# Patient Record
Sex: FEMALE | Employment: FULL TIME | URBAN - METROPOLITAN AREA
[De-identification: names, ages, dates, MRNs, and addresses within clinical notes are randomized per-mention and may not be internally consistent; named-entity substitution may affect disease eponyms.]

---

## 2020-09-16 ENCOUNTER — TRANSFERRED RECORDS (OUTPATIENT)
Dept: MULTI SPECIALTY CLINIC | Facility: CLINIC | Age: 55
End: 2020-09-16

## 2020-10-03 ENCOUNTER — TRANSFERRED RECORDS (OUTPATIENT)
Dept: MULTI SPECIALTY CLINIC | Facility: CLINIC | Age: 55
End: 2020-10-03

## 2020-10-03 LAB
HPV ABSTRACT: NORMAL
PAP-ABSTRACT: NORMAL

## 2021-08-03 ENCOUNTER — LAB REQUISITION (OUTPATIENT)
Dept: LAB | Facility: CLINIC | Age: 56
End: 2021-08-03

## 2021-08-03 PROCEDURE — 86481 TB AG RESPONSE T-CELL SUSP: CPT | Performed by: INTERNAL MEDICINE

## 2021-08-03 PROCEDURE — 87340 HEPATITIS B SURFACE AG IA: CPT | Performed by: INTERNAL MEDICINE

## 2021-08-03 PROCEDURE — 86706 HEP B SURFACE ANTIBODY: CPT | Performed by: INTERNAL MEDICINE

## 2021-08-04 LAB
HBV SURFACE AB SERPL IA-ACNC: 0.4 M[IU]/ML
HBV SURFACE AG SERPL QL IA: NONREACTIVE

## 2021-08-05 LAB
GAMMA INTERFERON BACKGROUND BLD IA-ACNC: 0.01 IU/ML
M TB IFN-G BLD-IMP: NEGATIVE
M TB IFN-G CD4+ BCKGRND COR BLD-ACNC: 8.24 IU/ML
MITOGEN IGNF BCKGRD COR BLD-ACNC: 0 IU/ML
MITOGEN IGNF BCKGRD COR BLD-ACNC: 0 IU/ML
QUANTIFERON MITOGEN: 8.25 IU/ML
QUANTIFERON NIL TUBE: 0.01 IU/ML
QUANTIFERON TB1 TUBE: 0.01 IU/ML
QUANTIFERON TB2 TUBE: 0.01

## 2021-09-08 ENCOUNTER — OFFICE VISIT (OUTPATIENT)
Dept: FAMILY MEDICINE | Facility: CLINIC | Age: 56
End: 2021-09-08
Payer: OTHER MISCELLANEOUS

## 2021-09-08 VITALS
HEART RATE: 78 BPM | DIASTOLIC BLOOD PRESSURE: 92 MMHG | SYSTOLIC BLOOD PRESSURE: 138 MMHG | OXYGEN SATURATION: 96 % | WEIGHT: 149.19 LBS

## 2021-09-08 DIAGNOSIS — R20.0 NUMBNESS AND TINGLING IN BOTH HANDS: Primary | ICD-10-CM

## 2021-09-08 DIAGNOSIS — R20.2 NUMBNESS AND TINGLING IN BOTH HANDS: Primary | ICD-10-CM

## 2021-09-08 PROCEDURE — 99203 OFFICE O/P NEW LOW 30 MIN: CPT | Performed by: NURSE PRACTITIONER

## 2021-09-08 RX ORDER — AMLODIPINE BESYLATE 10 MG/1
TABLET ORAL
COMMUNITY
Start: 2021-07-15 | End: 2023-10-18

## 2021-09-08 RX ORDER — POTASSIUM CHLORIDE 1500 MG/1
TABLET, EXTENDED RELEASE ORAL
COMMUNITY
Start: 2021-07-15 | End: 2021-11-26

## 2021-09-08 RX ORDER — LOSARTAN POTASSIUM 100 MG/1
TABLET ORAL
COMMUNITY
Start: 2021-07-15 | End: 2023-10-18

## 2021-09-08 NOTE — LETTER
September 8, 2021      Geovanna SUAZO Coco  [address] [place] [address]        To Whom It May Concern:    Geovanna was evaluated at my clinic today, on 9/8/2021.  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          Sincerely,        Carmelo D eLos Santos, CNP

## 2021-09-08 NOTE — PATIENT INSTRUCTIONS
We need to check an EMG of your bilateral arms to evaluate for carpal tunnel.    I need you to call and schedule appointment with the spine clinic at the [facility] to get the EMG completed.  Please call [phone] to set that up.    Ibuprofen 400 mg 3 times daily with food.    Continue with your wrist splints.    Note for work provided today.

## 2021-09-08 NOTE — LETTER
September 8, 2021      Geovanna SUAZO Coco  [address] [place] [address]        To Whom It May Concern:    Geovanna was evaluated at my clinic today, on 9/8/2021.  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        Sincerely,        Carmelo De Los Santos, CNP

## 2021-09-08 NOTE — PROGRESS NOTES
Assessment & Plan     Numbness and tingling in both hands  - EMG; Future    Patient Instructions   We need to check an EMG of your bilateral arms to evaluate for carpal tunnel.    I need you to call and schedule appointment with the spine clinic at the [facility] to get the EMG completed.  Please call [phone] to set that up.    Ibuprofen 400 mg 3 times daily with food.    Continue with your wrist splints.    Note for work provided today.      Ordering of each unique test  18 minutes spent on the date of the encounter doing chart review, history and exam, documentation and further activities per the note     See Patient Instructions    No follow-ups on file.    Carmelo De Los Santos, [facility]    Didi Austin is a 56 year old who presents for the following health issues  HPI     Patient comes the clinic today with numbness and tingling in her bilateral hands.  Present for the last few months but seems to be getting worse.  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  She has been wearing some braces during the day and at night.  Has not tried NSAIDs or ice.    Review of Systems   Constitutional, HEENT, cardiovascular, pulmonary, gi and gu systems are negative, except as otherwise noted.      Objective    BP (!) 138/92 (BP Location: Right arm, Patient Position: Sitting, Cuff Size: Adult Regular)   Pulse 78   Wt 67.7 kg (149 lb 3 oz)   SpO2 96%   There is no height or weight on file to calculate BMI.  Physical Exam     Phalen sign positive bilaterally

## 2021-09-27 ENCOUNTER — TELEPHONE (OUTPATIENT)
Dept: FAMILY MEDICINE | Facility: CLINIC | Age: 56
End: 2021-09-27

## 2021-09-27 DIAGNOSIS — F41.9 ANXIETY: Primary | ICD-10-CM

## 2021-09-27 NOTE — TELEPHONE ENCOUNTER
Reason for Call:  Other     Detailed comments: Patient requesting extension of work excuse for employer.  She is schedule for EMG on 10/6 and feel that she has not been diagnosed yet and would like to wait until diagnosis has been completed.    Patient will pick will up at clinic.  Please call when available.      Phone Number Patient can be reached at: Home number on file [phone] (home)    Best Time: any    Can we leave a detailed message on this number? YES    Call taken on 9/27/2021 at 3:53 PM by Laura L Goldberg, ARRT

## 2021-09-27 NOTE — LETTER
September 28, 2021      Geovanna Sepulveda  [address] [place] [address]        To Whom It May Concern:    Geovanna continues to suffer significant pain.  We are working towards a clear diagnosis at this time.     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           Sincerely,           Carmelo De Los Santos, CNP

## 2021-09-28 RX ORDER — HYDROXYZINE PAMOATE 25 MG/1
25 CAPSULE ORAL AT BEDTIME
Qty: 30 CAPSULE | Refills: 0 | Status: SHIPPED | OUTPATIENT
Start: 2021-09-28 | End: 2021-10-18

## 2021-09-28 NOTE — TELEPHONE ENCOUNTER
Patient's , Gigi Sepulveda will come to the clinic and  this letter. Patient has given a verbal authorization for him to  the letter.    Blanche Nowak

## 2021-10-07 ENCOUNTER — OFFICE VISIT (OUTPATIENT)
Dept: PHYSICAL MEDICINE AND REHAB | Facility: CLINIC | Age: 56
End: 2021-10-07
Attending: NURSE PRACTITIONER
Payer: OTHER MISCELLANEOUS

## 2021-10-07 DIAGNOSIS — R20.0 NUMBNESS AND TINGLING IN BOTH HANDS: ICD-10-CM

## 2021-10-07 DIAGNOSIS — R20.2 NUMBNESS AND TINGLING IN BOTH HANDS: ICD-10-CM

## 2021-10-07 PROCEDURE — 95911 NRV CNDJ TEST 9-10 STUDIES: CPT | Performed by: PHYSICAL MEDICINE & REHABILITATION

## 2021-10-07 PROCEDURE — 95886 MUSC TEST DONE W/N TEST COMP: CPT | Mod: RT | Performed by: PHYSICAL MEDICINE & REHABILITATION

## 2021-10-07 NOTE — PATIENT INSTRUCTIONS
Thank you for choosing the [facility] for your EMG testing.    The ordering provider will receive your final EMG results within the next few days.  Please follow up with your provider for the results and further treatment recommendations.

## 2021-10-07 NOTE — PROGRESS NOTES
Please call the patient and let her know that I reviewed the EMG study from Dr. Soler's.  There is some evidence of carpal tunnel syndrome based on that study.  If she would like to have a consultation with neurosurgery, I can place a referral for her.

## 2021-10-07 NOTE — LETTER
10/7/2021         RE: Geovanna Sepulveda  [address]  [place] [address]        Dear Colleague,    Thank you for referring your patient, Geovanna Sepulveda, to the [facility]. Please see a copy of my visit note below.    Patient presents at the request of Carmelo De Los Santos CNP for bilateral upper extremity EMG.  She has over 1 month history of bilateral hand numbness tingling and pain all fingers involved up to the elbows.  Right hand is worse than left.  She is right-handed.  On exam she has normal sensation to light touch of the upper extremities, normal reflexes to the upper extremities and normal strength throughout the major muscle groups of the upper extremities.    EMG/NCS  results: Please see scanned full report.    Comment NCS: Abnormal study:    1.  Mildly prolonged right median SNAP latency  2.  Mildly prolonged bilateral median transcarpal latencies.   3.  Prolonged bilateral median versus ulnar transcarpal latency comparison.  4.  Normal bilateral ulnar studies.  5.  Left ulnar motor inching across the elbow recording at ADM: No focal slowing or amplitude drop across the medial epicondyle.    Comment EMG: Normal study.  1.  Normal needle EMG bilateral upper extremities.    Interpretation: Abnormal study: There is electrodiagnostic evidence of:    1.  Bilateral median neuropathy at the wrist consistent with entrapment in the carpal tunnel, mild in severity.  Right equal to left in severity    2. There is no electrodiagnostic evidence of cervical radiculopathy, brachial plexopathy, or ulnar neuropathy in the bilateral upper extremities.    The testing was completed in its entirety by the physician.      It was our pleasure caring for your patient today, if there any questions or concerns please do not hesitate to contact us.        Again, thank you for allowing me to participate in the care of your patient.        Sincerely,        Temo Magdaleno, DO    
0

## 2021-10-07 NOTE — PROGRESS NOTES
Patient presents at the request of Carmelo De Los Santos CNP for bilateral upper extremity EMG.  She has over 1 month history of bilateral hand numbness tingling and pain all fingers involved up to the elbows.  Right hand is worse than left.  She is right-handed.  On exam she has normal sensation to light touch of the upper extremities, normal reflexes to the upper extremities and normal strength throughout the major muscle groups of the upper extremities.    EMG/NCS  results: Please see scanned full report.    Comment NCS: Abnormal study:    1.  Mildly prolonged right median SNAP latency  2.  Mildly prolonged bilateral median transcarpal latencies.   3.  Prolonged bilateral median versus ulnar transcarpal latency comparison.  4.  Normal bilateral ulnar studies.  5.  Left ulnar motor inching across the elbow recording at ADM: No focal slowing or amplitude drop across the medial epicondyle.    Comment EMG: Normal study.  1.  Normal needle EMG bilateral upper extremities.    Interpretation: Abnormal study: There is electrodiagnostic evidence of:    1.  Bilateral median neuropathy at the wrist consistent with entrapment in the carpal tunnel, mild in severity.  Right equal to left in severity    2. There is no electrodiagnostic evidence of cervical radiculopathy, brachial plexopathy, or ulnar neuropathy in the bilateral upper extremities.    The testing was completed in its entirety by the physician.      It was our pleasure caring for your patient today, if there any questions or concerns please do not hesitate to contact us.

## 2021-10-08 ENCOUNTER — TELEPHONE (OUTPATIENT)
Dept: FAMILY MEDICINE | Facility: CLINIC | Age: 56
End: 2021-10-08

## 2021-10-08 NOTE — TELEPHONE ENCOUNTER
Message below relayed to patient. She would like you to place the referral and requests a call back with the scheduling information     DIONTE Peres, Carmelo Renny, CNP at 10/7/2021  3:00 PM    Status: Signed      Please call the patient and let her know that I reviewed the EMG study from Dr. Soler's.  There is some evidence of carpal tunnel syndrome based on that study.  If she would like to have a consultation with neurosurgery, I can place a referral for her.

## 2021-10-12 NOTE — TELEPHONE ENCOUNTER
[facility]   [facility] [facility] [address]   Phone: [phone]   Fax: [phone]     LM for patient with the above referral info

## 2021-10-15 DIAGNOSIS — F41.9 ANXIETY: ICD-10-CM

## 2021-10-15 RX ORDER — HYDROXYZINE PAMOATE 25 MG/1
25 CAPSULE ORAL AT BEDTIME
Qty: 30 CAPSULE | Refills: 0 | Status: CANCELLED | OUTPATIENT
Start: 2021-10-15

## 2021-10-15 NOTE — TELEPHONE ENCOUNTER
No PCP    Refill Request  Medication name: Pending Prescriptions:                       Disp   Refills    hydrOXYzine (VISTARIL) 25 MG capsule      30 cap*0            Sig: Take 1 capsule (25 mg) by mouth At Bedtime    Who prescribed the medication: Filiberto  Last refill on medication: unknown  Requested Pharmacy: [facility]  Last appointment with PCP: 09/08/21  Next appointment: Not due     Request for 90 day supply

## 2021-10-15 NOTE — TELEPHONE ENCOUNTER
Requesting 90 day supply    Refill Request  Medication name: Pending Prescriptions:                       Disp   Refills    hydrOXYzine (VISTARIL) 25 MG capsule      30 cap*0            Sig: Take 1 capsule (25 mg) by mouth At Bedtime    Who prescribed the medication: Filiberto  Last refill on medication: 09/28/21  Requested Pharmacy: [facility]  Last appointment with PCP: 09/08/21  Next appointment: Not due    NO PCP

## 2021-10-18 RX ORDER — HYDROXYZINE PAMOATE 25 MG/1
25 CAPSULE ORAL AT BEDTIME
Qty: 30 CAPSULE | Refills: 0 | Status: SHIPPED | OUTPATIENT
Start: 2021-10-18 | End: 2023-10-18

## 2021-10-28 ENCOUNTER — OFFICE VISIT (OUTPATIENT)
Dept: FAMILY MEDICINE | Facility: CLINIC | Age: 56
End: 2021-10-28
Payer: OTHER MISCELLANEOUS

## 2021-10-28 VITALS
SYSTOLIC BLOOD PRESSURE: 132 MMHG | HEART RATE: 75 BPM | OXYGEN SATURATION: 98 % | WEIGHT: 150.5 LBS | DIASTOLIC BLOOD PRESSURE: 84 MMHG

## 2021-10-28 DIAGNOSIS — G56.03 BILATERAL CARPAL TUNNEL SYNDROME: Primary | ICD-10-CM

## 2021-10-28 PROCEDURE — 99213 OFFICE O/P EST LOW 20 MIN: CPT | Performed by: NURSE PRACTITIONER

## 2021-10-28 NOTE — PATIENT INSTRUCTIONS
"Please call [facility] and set up an appointment with Dr. Brice to discuss your carpal tunnel syndrome.  [phone].    I will give you a copy of the EMG results from today.  Take these with you to your appointment with Dr. 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      "

## 2021-10-28 NOTE — LETTER
October 28, 2021      Geovanna Sepulveda  [address] [place] [address]        To Whom It May Concern:    Geovanna Sepulveda was seen in our clinic.     She has had an EMG study completed which has revealed bilateral carpal tunnel syndrome.  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    She is currently in the process of working with a specialist on how to treat her carpal tunnel syndrome.  The extent of these restrictions will be determined after her next appointment with her specialist      Sincerely,        Carmelo De Los Santos, CNP

## 2021-10-29 NOTE — PROGRESS NOTES
"  Assessment & Plan     Bilateral carpal tunnel syndrome  Confirmed on EMG, although EMG testing suggests that mild severity. She would like to consult with a specialist. She has been having a hard time scheduling with neurosurgery so I recommended that she see Dr. Brice and Carmen.    - Orthopedic  Referral; Future    Patient Instructions   Please call [facility] and set up an appointment with Dr. Brice to discuss your carpal tunnel syndrome.  [phone].    I will give you a copy of the EMG results from today.  Take these with you to your appointment with Dr. 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       See Patient Instructions    Return in about 6 months (around 4/28/2022) for Follow up.    Carmelo De Los Santos, [facility]    Didi Austin is a 56 year old who presents for the following health issues     HPI     Has been continue to struggle with numbness and tingling/pain in her bilateral forearms. Had EMG which confirmed CTS. Tried to schedule with [facility] but could not get through on telephone line.     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       Review of Systems   Constitutional, HEENT, cardiovascular, pulmonary, gi and gu systems are negative, except as otherwise noted.      Objective    /84 (BP Location: Right arm, Patient Position: Sitting, Cuff Size: Adult Regular)   Pulse 75   Wt 68.3 kg (150 lb 8 oz)   SpO2 98%   There is no height or weight on file to calculate BMI.  Physical Exam   Healthy appearing female in no acute distress, accompanied by .         " RA (rheumatoid arthritis)

## 2021-11-26 ENCOUNTER — OFFICE VISIT (OUTPATIENT)
Dept: INTERNAL MEDICINE | Facility: CLINIC | Age: 56
End: 2021-11-26
Payer: OTHER MISCELLANEOUS

## 2021-11-26 VITALS
WEIGHT: 155 LBS | HEART RATE: 78 BPM | OXYGEN SATURATION: 98 % | DIASTOLIC BLOOD PRESSURE: 82 MMHG | HEIGHT: 61 IN | SYSTOLIC BLOOD PRESSURE: 132 MMHG | BODY MASS INDEX: 29.27 KG/M2

## 2021-11-26 DIAGNOSIS — Z01.818 PREOPERATIVE EXAMINATION: ICD-10-CM

## 2021-11-26 DIAGNOSIS — R73.03 PREDIABETES: ICD-10-CM

## 2021-11-26 DIAGNOSIS — I10 BENIGN ESSENTIAL HYPERTENSION: ICD-10-CM

## 2021-11-26 DIAGNOSIS — E66.3 OVERWEIGHT (BMI 25.0-29.9): ICD-10-CM

## 2021-11-26 DIAGNOSIS — G56.03 BILATERAL CARPAL TUNNEL SYNDROME: ICD-10-CM

## 2021-11-26 DIAGNOSIS — F41.9 ANXIETY: ICD-10-CM

## 2021-11-26 LAB
ALBUMIN SERPL-MCNC: 4.1 G/DL (ref 3.5–5)
ALP SERPL-CCNC: 102 U/L (ref 45–120)
ALT SERPL W P-5'-P-CCNC: 18 U/L (ref 0–45)
ANION GAP SERPL CALCULATED.3IONS-SCNC: 13 MMOL/L (ref 5–18)
AST SERPL W P-5'-P-CCNC: 18 U/L (ref 0–40)
BASOPHILS # BLD AUTO: 0.1 10E3/UL (ref 0–0.2)
BASOPHILS NFR BLD AUTO: 1 %
BILIRUB SERPL-MCNC: 0.2 MG/DL (ref 0–1)
BUN SERPL-MCNC: 27 MG/DL (ref 8–22)
CALCIUM SERPL-MCNC: 9.9 MG/DL (ref 8.5–10.5)
CHLORIDE BLD-SCNC: 104 MMOL/L (ref 98–107)
CO2 SERPL-SCNC: 24 MMOL/L (ref 22–31)
CREAT SERPL-MCNC: 0.83 MG/DL (ref 0.6–1.1)
EOSINOPHIL # BLD AUTO: 0.7 10E3/UL (ref 0–0.7)
EOSINOPHIL NFR BLD AUTO: 6 %
ERYTHROCYTE [DISTWIDTH] IN BLOOD BY AUTOMATED COUNT: 13 % (ref 10–15)
GFR SERPL CREATININE-BSD FRML MDRD: 80 ML/MIN/1.73M2
GLUCOSE BLD-MCNC: 89 MG/DL (ref 70–125)
HBA1C MFR BLD: 5.8 % (ref 0–5.6)
HCT VFR BLD AUTO: 41.4 % (ref 35–47)
HGB BLD-MCNC: 13.7 G/DL (ref 11.7–15.7)
IMM GRANULOCYTES # BLD: 0 10E3/UL
IMM GRANULOCYTES NFR BLD: 0 %
LYMPHOCYTES # BLD AUTO: 3.2 10E3/UL (ref 0.8–5.3)
LYMPHOCYTES NFR BLD AUTO: 24 %
MCH RBC QN AUTO: 28.4 PG (ref 26.5–33)
MCHC RBC AUTO-ENTMCNC: 33.1 G/DL (ref 31.5–36.5)
MCV RBC AUTO: 86 FL (ref 78–100)
MONOCYTES # BLD AUTO: 0.7 10E3/UL (ref 0–1.3)
MONOCYTES NFR BLD AUTO: 6 %
NEUTROPHILS # BLD AUTO: 8.4 10E3/UL (ref 1.6–8.3)
NEUTROPHILS NFR BLD AUTO: 64 %
PLATELET # BLD AUTO: 405 10E3/UL (ref 150–450)
POTASSIUM BLD-SCNC: 3.5 MMOL/L (ref 3.5–5)
PROT SERPL-MCNC: 8.1 G/DL (ref 6–8)
RBC # BLD AUTO: 4.83 10E6/UL (ref 3.8–5.2)
SODIUM SERPL-SCNC: 141 MMOL/L (ref 136–145)
WBC # BLD AUTO: 13.1 10E3/UL (ref 4–11)

## 2021-11-26 PROCEDURE — 80053 COMPREHEN METABOLIC PANEL: CPT | Performed by: NURSE PRACTITIONER

## 2021-11-26 PROCEDURE — 83036 HEMOGLOBIN GLYCOSYLATED A1C: CPT | Performed by: NURSE PRACTITIONER

## 2021-11-26 PROCEDURE — 99214 OFFICE O/P EST MOD 30 MIN: CPT | Performed by: NURSE PRACTITIONER

## 2021-11-26 PROCEDURE — 36415 COLL VENOUS BLD VENIPUNCTURE: CPT | Performed by: NURSE PRACTITIONER

## 2021-11-26 PROCEDURE — 85025 COMPLETE CBC W/AUTO DIFF WBC: CPT | Performed by: NURSE PRACTITIONER

## 2021-11-26 RX ORDER — POTASSIUM CHLORIDE 1500 MG/1
TABLET, EXTENDED RELEASE ORAL
COMMUNITY
Start: 2021-10-23 | End: 2023-10-18

## 2021-11-26 ASSESSMENT — MIFFLIN-ST. JEOR: SCORE: 1235.46

## 2021-11-26 NOTE — PATIENT INSTRUCTIONS
Continue your current medications. No changes prior to surgery.    No Aleve, Advil, or Ibuprofen 7 days prior to surgery.    COVID testing has been ordered, scheduling will call you to set this.    Follow up prior to surgery if having new symptoms.  Patient Education

## 2021-11-26 NOTE — PROGRESS NOTES
[facility]  [address] [place] [address]  Phone: [phone]  Fax: [phone]  Primary Provider: No Ref-Primary, Physician  Pre-op Performing Provider: SCOTT BARBA    PREOPERATIVE EVALUATION:  Today's date: 11/26/2021    Geovanna Sepulveda is a 56 year old female who presents for a preoperative evaluation.  Pre-op 12/2/21, Carpel Tunnel Surgery, [facility]  Surgical Information:  Surgery/Procedure: Carpal tunnel surgery  Surgery Location: [facility]  Surgeon:   Surgery Date: 12/02/2021  Time of Surgery:   Where patient plans to recover: At home with family  Fax number for surgical facility:     Type of Anesthesia Anticipated: Local with MAC    Assessment & Plan     The proposed surgical procedure is considered INTERMEDIATE risk.    Preoperative examination: COVID testing ordered three days prior to surgery. No NSAIDS 7 days prior to surgery. Follow up prior to surgery if having new symptoms.   - CBC with platelets and differential  - Asymptomatic COVID-19 Virus (Coronavirus) by PCR  - CBC with platelets and differential    Bilateral carpal tunnel syndrome: To have a bilateral carpal tunnel release.     Benign essential hypertension: Blood pressure today in office was 132/82. She continues on Norvasc and Losartan. Stable.     Prediabetes: A1C today was 5.8%. Diet and exercise controlled. Stable.   - Comprehensive metabolic panel (BMP + Alb, Alk Phos, ALT, AST, Total. Bili, TP)  - Hemoglobin A1c    Overweight (BMI 25.0-29.9): She continues to work on diet and exercise.     Anxiety: hx of this. Uses hydroxyzine PRN. Stable.     Risks and Recommendations:  The patient has the following additional risks and recommendations for perioperative complications:   - No identified additional risk factors other than previously addressed    Medication Instructions:  Continue all medications, no changes    RECOMMENDATION:  APPROVAL GIVEN to proceed with  proposed procedure, without further diagnostic evaluation.    Subjective     HPI related to upcoming procedure: The patient presents today for a preoperative examination.    She will be having bilateral carpal tunnel surgery done on 12/02. She reports that she works for [employer], and has been wiping down carts, and cashiering for months, and her wrists started to hurt her. She had EMG's done which showed the carpal tunnel.    She continues to wear her wrist braces daily.    She reports that she has a history of high blood pressure, but no other cardiac issues.    She has never had a heart attack, stroke, or a history of obstructive sleep apnea.    She would like to be a full code.    She denies other concerns today.     Preop Questions 11/26/2021   1. Have you ever had a heart attack or stroke? No   2. Have you ever had surgery on your heart or blood vessels, such as a stent placement, a coronary artery bypass, or surgery on an artery in your head, neck, heart, or legs? No   3. Do you have chest pain with activity? No   4. Do you have a history of  heart failure? No   5. Do you currently have a cold, bronchitis or symptoms of other infection? No   6. Do you have a cough, shortness of breath, or wheezing? No   7. Do you or anyone in your family have previous history of blood clots? No   8. Do you or does anyone in your family have a serious bleeding problem such as prolonged bleeding following surgeries or cuts? No   9. Have you ever had problems with anemia or been told to take iron pills? No   10. Have you had any abnormal blood loss such as black, tarry or bloody stools, or abnormal vaginal bleeding? No   11. Have you ever had a blood transfusion? No   12. Are you willing to have a blood transfusion if it is medically needed before, during, or after your surgery? Yes   13. Have you or any of your relatives ever had problems with anesthesia? No   14. Do you have sleep apnea, excessive snoring or daytime drowsiness?  "No   15. Do you have any artifical heart valves or other implanted medical devices like a pacemaker, defibrillator, or continuous glucose monitor? No   16. Do you have artificial joints? No   17. Are you allergic to latex? No   18. Is there any chance that you may be pregnant? No       Health Care Directive:  Patient does not have a Health Care Directive or Living Will: Full Code    Preoperative Review of :   reviewed - no record of controlled substances prescribed.      Review of Systems  CONSTITUTIONAL: NEGATIVE for fever, chills, change in weight  ENT/MOUTH: NEGATIVE for ear, mouth and throat problems  RESP: NEGATIVE for significant cough or SOB  CV: NEGATIVE for chest pain, palpitations or peripheral edema    There are no problems to display for this patient.     No past medical history on file.  No past surgical history on file.  Current Outpatient Medications   Medication Sig Dispense Refill     amLODIPine (NORVASC) 10 MG tablet TAKE 1 TABLET BY MOUTH EVERY DAY       cholecalciferol 25 MCG (1000 UT) TABS TAKE 1 TABLET BY MOUTH EVERY DAY       hydrOXYzine (VISTARIL) 25 MG capsule Take 1 capsule (25 mg) by mouth At Bedtime 30 capsule 0     losartan (COZAAR) 100 MG tablet TAKE 1 TABLET BY MOUTH DAILY       potassium chloride ER (K-TAB) 20 MEQ CR tablet          Allergies   Allergen Reactions     Other Environmental Allergy Itching     Sulfamethoxazole-Trimethoprim Rash        Social History     Tobacco Use     Smoking status: Never Smoker     Smokeless tobacco: Never Used   Substance Use Topics     Alcohol use: Never     History reviewed. No pertinent family history.  History   Drug Use Unknown         Objective     /82   Pulse 78   Ht 1.549 m (5' 1\")   Wt 70.3 kg (155 lb)   SpO2 98%   BMI 29.29 kg/m      Physical Exam    GENERAL APPEARANCE: healthy, alert and no distress     EYES: EOMI, PERRL     HENT: ear canals and TM's normal and nose and mouth without ulcers or lesions     NECK: no " adenopathy, no asymmetry, masses, or scars and thyroid normal to palpation     RESP: lungs clear to auscultation - no rales, rhonchi or wheezes     CV: regular rates and rhythm, normal S1 S2, no S3 or S4 and no murmur, click or rub     ABDOMEN:  soft, nontender, no HSM or masses and bowel sounds normal     MS: extremities normal- no gross deformities noted, no evidence of inflammation in joints, FROM in all extremities.     SKIN: no suspicious lesions or rashes     NEURO: Normal strength and tone, sensory exam grossly normal, mentation intact and speech normal     PSYCH: mentation appears normal. and affect normal/bright     LYMPHATICS: No cervical adenopathy    No results for input(s): HGB, PLT, INR, NA, POTASSIUM, CR, A1C in the last 86214 hours.     Diagnostics:  Recent Results (from the past 24 hour(s))   Hemoglobin A1c    Collection Time: 11/26/21  3:33 PM   Result Value Ref Range    Hemoglobin A1C 5.8 (H) 0.0 - 5.6 %   CBC with platelets and differential    Collection Time: 11/26/21  3:33 PM   Result Value Ref Range    WBC Count 13.1 (H) 4.0 - 11.0 10e3/uL    RBC Count 4.83 3.80 - 5.20 10e6/uL    Hemoglobin 13.7 11.7 - 15.7 g/dL    Hematocrit 41.4 35.0 - 47.0 %    MCV 86 78 - 100 fL    MCH 28.4 26.5 - 33.0 pg    MCHC 33.1 31.5 - 36.5 g/dL    RDW 13.0 10.0 - 15.0 %    Platelet Count 405 150 - 450 10e3/uL    % Neutrophils 64 %    % Lymphocytes 24 %    % Monocytes 6 %    % Eosinophils 6 %    % Basophils 1 %    % Immature Granulocytes 0 %    Absolute Neutrophils 8.4 (H) 1.6 - 8.3 10e3/uL    Absolute Lymphocytes 3.2 0.8 - 5.3 10e3/uL    Absolute Monocytes 0.7 0.0 - 1.3 10e3/uL    Absolute Eosinophils 0.7 0.0 - 0.7 10e3/uL    Absolute Basophils 0.1 0.0 - 0.2 10e3/uL    Absolute Immature Granulocytes 0.0 <=0.0 10e3/uL      No EKG required, no history of coronary heart disease, significant arrhythmia, peripheral arterial disease or other structural heart disease.    Revised Cardiac Risk Index (RCRI):  The patient has  the following serious cardiovascular risks for perioperative complications:   - No serious cardiac risks = 0 points     RCRI Interpretation: 0 points: Class I (very low risk - 0.4% complication rate)           Signed Electronically by: Rosaura Zelaya CNP  Copy of this evaluation report is provided to requesting physician.

## 2021-11-29 ENCOUNTER — LAB (OUTPATIENT)
Dept: FAMILY MEDICINE | Facility: CLINIC | Age: 56
End: 2021-11-29
Attending: NURSE PRACTITIONER
Payer: COMMERCIAL

## 2021-11-29 ENCOUNTER — TELEPHONE (OUTPATIENT)
Dept: INTERNAL MEDICINE | Facility: CLINIC | Age: 56
End: 2021-11-29

## 2021-11-29 DIAGNOSIS — Z01.818 PREOPERATIVE EXAMINATION: ICD-10-CM

## 2021-11-29 LAB — SARS-COV-2 RNA RESP QL NAA+PROBE: NEGATIVE

## 2021-11-29 PROCEDURE — U0003 INFECTIOUS AGENT DETECTION BY NUCLEIC ACID (DNA OR RNA); SEVERE ACUTE RESPIRATORY SYNDROME CORONAVIRUS 2 (SARS-COV-2) (CORONAVIRUS DISEASE [COVID-19]), AMPLIFIED PROBE TECHNIQUE, MAKING USE OF HIGH THROUGHPUT TECHNOLOGIES AS DESCRIBED BY CMS-2020-01-R: HCPCS

## 2021-11-29 PROCEDURE — U0005 INFEC AGEN DETEC AMPLI PROBE: HCPCS

## 2021-11-29 NOTE — TELEPHONE ENCOUNTER
----- Message from Rosaura Zelaya CNP sent at 11/29/2021  6:34 AM CST -----  Please call the patient and update her of her labs as follows:    Her white count was mildly elevated at 13.1, red count, hemoglobin were all normal. The elevated white count is likely due to dehydration.     Her electrolytes, blood sugar, kidney function was normal.  Her liver function was normal.  Her urea nitrogen was elevated at 27, telling me she is dehydrated.  Push more water.    Her hemoglobin A1c your 90-day look back at her blood sugar was 5.8%, this continues to put her in the prediabetic range.  Continue working on diet and exercise.

## 2021-12-02 ENCOUNTER — TRANSFERRED RECORDS (OUTPATIENT)
Dept: HEALTH INFORMATION MANAGEMENT | Facility: CLINIC | Age: 56
End: 2021-12-02

## 2021-12-12 ENCOUNTER — HEALTH MAINTENANCE LETTER (OUTPATIENT)
Age: 56
End: 2021-12-12

## 2022-01-07 ENCOUNTER — TRANSFERRED RECORDS (OUTPATIENT)
Dept: HEALTH INFORMATION MANAGEMENT | Facility: CLINIC | Age: 57
End: 2022-01-07

## 2022-10-03 ENCOUNTER — HEALTH MAINTENANCE LETTER (OUTPATIENT)
Age: 57
End: 2022-10-03

## 2023-02-11 ENCOUNTER — HEALTH MAINTENANCE LETTER (OUTPATIENT)
Age: 58
End: 2023-02-11

## 2023-05-17 ENCOUNTER — TRANSFERRED RECORDS (OUTPATIENT)
Dept: MULTI SPECIALTY CLINIC | Facility: CLINIC | Age: 58
End: 2023-05-17

## 2023-10-04 ENCOUNTER — TRANSFERRED RECORDS (OUTPATIENT)
Dept: HEALTH INFORMATION MANAGEMENT | Facility: CLINIC | Age: 58
End: 2023-10-04

## 2023-10-11 ENCOUNTER — TRANSFERRED RECORDS (OUTPATIENT)
Dept: HEALTH INFORMATION MANAGEMENT | Facility: CLINIC | Age: 58
End: 2023-10-11
Payer: COMMERCIAL

## 2023-10-15 ASSESSMENT — ENCOUNTER SYMPTOMS
BREAST MASS: 0
PARESTHESIAS: 0
DIZZINESS: 0
HEARTBURN: 0
NAUSEA: 0
HEADACHES: 0
HEMATOCHEZIA: 0
SHORTNESS OF BREATH: 0
NERVOUS/ANXIOUS: 0
DYSURIA: 0
DIARRHEA: 0
PALPITATIONS: 0
HEMATURIA: 0
FREQUENCY: 0
COUGH: 0
MYALGIAS: 1
JOINT SWELLING: 0
EYE PAIN: 0
CONSTIPATION: 0
ARTHRALGIAS: 1
CHILLS: 0
SORE THROAT: 0
ABDOMINAL PAIN: 0
WEAKNESS: 0
FEVER: 0

## 2023-10-18 ENCOUNTER — OFFICE VISIT (OUTPATIENT)
Dept: FAMILY MEDICINE | Facility: CLINIC | Age: 58
End: 2023-10-18
Payer: COMMERCIAL

## 2023-10-18 VITALS
TEMPERATURE: 97.9 F | DIASTOLIC BLOOD PRESSURE: 78 MMHG | BODY MASS INDEX: 29.76 KG/M2 | HEART RATE: 82 BPM | SYSTOLIC BLOOD PRESSURE: 118 MMHG | HEIGHT: 61 IN | RESPIRATION RATE: 14 BRPM | WEIGHT: 157.6 LBS | OXYGEN SATURATION: 95 %

## 2023-10-18 DIAGNOSIS — I10 ESSENTIAL HYPERTENSION: ICD-10-CM

## 2023-10-18 DIAGNOSIS — G89.29 CHRONIC NEUROPATHIC PAIN: ICD-10-CM

## 2023-10-18 DIAGNOSIS — F41.9 ANXIETY: ICD-10-CM

## 2023-10-18 DIAGNOSIS — R73.03 PREDIABETES: ICD-10-CM

## 2023-10-18 DIAGNOSIS — E78.5 HYPERLIPIDEMIA LDL GOAL <100: ICD-10-CM

## 2023-10-18 DIAGNOSIS — M79.2 CHRONIC NEUROPATHIC PAIN: ICD-10-CM

## 2023-10-18 DIAGNOSIS — Z00.00 ROUTINE GENERAL MEDICAL EXAMINATION AT A HEALTH CARE FACILITY: Primary | ICD-10-CM

## 2023-10-18 DIAGNOSIS — E87.6 HYPOKALEMIA: ICD-10-CM

## 2023-10-18 PROBLEM — D12.6 ADENOMATOUS POLYP OF COLON: Status: ACTIVE | Noted: 2020-10-02

## 2023-10-18 PROBLEM — Z80.41 FAMILY HISTORY OF OVARIAN CANCER: Status: ACTIVE | Noted: 2021-04-15

## 2023-10-18 PROCEDURE — 99396 PREV VISIT EST AGE 40-64: CPT | Performed by: NURSE PRACTITIONER

## 2023-10-18 PROCEDURE — 99214 OFFICE O/P EST MOD 30 MIN: CPT | Mod: 25 | Performed by: NURSE PRACTITIONER

## 2023-10-18 RX ORDER — AMLODIPINE BESYLATE 10 MG/1
10 TABLET ORAL DAILY
Qty: 90 TABLET | Refills: 3 | Status: SHIPPED | OUTPATIENT
Start: 2023-10-18

## 2023-10-18 RX ORDER — POTASSIUM CHLORIDE 1500 MG/1
60 TABLET, EXTENDED RELEASE ORAL DAILY
Qty: 270 TABLET | Refills: 1 | Status: SHIPPED | OUTPATIENT
Start: 2023-10-18 | End: 2024-03-11

## 2023-10-18 RX ORDER — VITAMIN B COMPLEX
1 TABLET ORAL DAILY
Status: CANCELLED | OUTPATIENT
Start: 2023-10-18

## 2023-10-18 RX ORDER — ATORVASTATIN CALCIUM 20 MG/1
TABLET, FILM COATED ORAL
COMMUNITY
End: 2023-10-18

## 2023-10-18 RX ORDER — GABAPENTIN 250 MG/5ML
300 SOLUTION ORAL 3 TIMES DAILY
COMMUNITY
Start: 2023-10-11

## 2023-10-18 RX ORDER — LOSARTAN POTASSIUM 100 MG/1
100 TABLET ORAL DAILY
Qty: 90 TABLET | Refills: 3 | Status: SHIPPED | OUTPATIENT
Start: 2023-10-18

## 2023-10-18 RX ORDER — GABAPENTIN 250 MG/5ML
300 SOLUTION ORAL 3 TIMES DAILY
Status: CANCELLED | OUTPATIENT
Start: 2023-10-18

## 2023-10-18 RX ORDER — ATORVASTATIN CALCIUM 20 MG/1
40 TABLET, FILM COATED ORAL DAILY
Qty: 90 TABLET | Refills: 3 | Status: SHIPPED | OUTPATIENT
Start: 2023-10-18 | End: 2023-12-12

## 2023-10-18 RX ORDER — HYDROXYZINE PAMOATE 25 MG/1
25 CAPSULE ORAL AT BEDTIME
Qty: 30 CAPSULE | Refills: 0 | Status: SHIPPED | OUTPATIENT
Start: 2023-10-18 | End: 2023-12-13

## 2023-10-18 ASSESSMENT — ENCOUNTER SYMPTOMS
DIARRHEA: 0
DYSURIA: 0
BREAST MASS: 0
EYE PAIN: 0
JOINT SWELLING: 0
SORE THROAT: 0
PALPITATIONS: 0
NERVOUS/ANXIOUS: 0
ABDOMINAL PAIN: 0
MYALGIAS: 1
ARTHRALGIAS: 1
HEADACHES: 0
HEMATURIA: 0
CONSTIPATION: 0
FEVER: 0
PARESTHESIAS: 0
SHORTNESS OF BREATH: 0
COUGH: 0
HEARTBURN: 0
CHILLS: 0
FREQUENCY: 0
DIZZINESS: 0
HEMATOCHEZIA: 0
WEAKNESS: 0
NAUSEA: 0

## 2023-10-18 NOTE — PROGRESS NOTES
"   SUBJECTIVE:   CC: Geovanna is an 58 year old who presents for preventive health visit.       Healthy Habits:     Getting at least 3 servings of Calcium per day:  Yes    Bi-annual eye exam:  Yes    Dental care twice a year:  Yes    Sleep apnea or symptoms of sleep apnea:  None    Diet:  Regular (no restrictions)    Frequency of exercise:  2-3 days/week    Duration of exercise:  15-30 minutes    Taking medications regularly:  Yes    Medication side effects:  None    Additional concerns today:  Yes    Previous PCP at [facility] but switched insurances where she needs a new [facility] PCP    Has had abnormal potassium in past     Has a treadmill in the house  Walks 2-3 days/week for 30mins    Neuropathy - in both arms and legs. has EMG - seen by neurology - saw Ortho MRI of lower back - said was normal and she has a \"Nerve problem\" -   [facility] (gordon Rushing) TONIO signed today - 2023  Started her on gabapentin 300mg TID -two weeks ago and feeling 100% better    Mom  of ovarian cancer - mets to lungs - patient Ca125 low in 2022    Colonoscopy 2020  Mammo done 2023  10/3/20 [facility]HPV [facility]    Takes D3     Works in  in assisted living.     Today's PHQ-2 Score:       10/18/2023     8:40 AM   PHQ-2 (  [employer])   Q1: Little interest or pleasure in doing things 0   Q2: Feeling down, depressed or hopeless 1   PHQ-2 Score 1   Q1: Little interest or pleasure in doing things Not at all   Q2: Feeling down, depressed or hopeless Several days   PHQ-2 Score 1               Have you ever done Advance Care Planning? (For example, a Health Directive, POLST, or a discussion with a medical provider or your loved ones about your wishes): No, advance care planning information given to patient to review.  Patient plans to discuss their wishes with loved ones or provider.      Social History     Tobacco Use    Smoking status: Never     Passive exposure: Never    " Smokeless tobacco: Never   Substance Use Topics    Alcohol use: Never             10/15/2023    10:31 AM   Alcohol Use   Prescreen: >3 drinks/day or >7 drinks/week? No     Reviewed orders with patient.  Reviewed health maintenance and updated orders accordingly - Yes      Breast Cancer Screening:    FHS-7:       11/26/2021     2:53 PM 10/15/2023    10:33 AM   Breast CA Risk Assessment (FHS-7)   Did any of your first-degree relatives have breast or ovarian cancer? Yes Yes   Did any of your relatives have bilateral breast cancer? No Yes   Did any man in your family have breast cancer? No Yes   Did any woman in your family have breast and ovarian cancer? Yes Yes   Did any woman in your family have breast cancer before age 50 y? No Yes   Do you have 2 or more relatives with breast and/or ovarian cancer? Yes Yes   Do you have 2 or more relatives with breast and/or bowel cancer? No Yes       Mammogram Screening: Recommended mammography every 1-2 years with patient discussion and risk factor consideration  Pertinent mammograms are reviewed under the imaging tab.    History of abnormal Pap smear: NO - age 30-65 PAP every 5 years with negative HPV co-testing recommended     Reviewed and updated as needed this visit by clinical staff   Tobacco  Allergies  Meds  Problems  Med Hx  Surg Hx  Fam Hx          Reviewed and updated as needed this visit by Provider   Tobacco  Allergies  Meds  Problems  Med Hx  Surg Hx  Fam Hx         Past Medical History:   Diagnosis Date    Hypertension 2012      Past Surgical History:   Procedure Laterality Date    CARPAL TUNNEL RELEASE RT/LT Bilateral 12/2020    [facility]       Review of Systems   Constitutional:  Negative for chills and fever.   HENT:  Negative for congestion, ear pain, hearing loss and sore throat.    Eyes:  Negative for pain and visual disturbance.   Respiratory:  Negative for cough and shortness of breath.    Cardiovascular:  Negative for chest pain,  "palpitations and peripheral edema.   Gastrointestinal:  Negative for abdominal pain, constipation, diarrhea, heartburn, hematochezia and nausea.   Breasts:  Negative for tenderness, breast mass and discharge.   Genitourinary:  Negative for dysuria, frequency, genital sores, hematuria, pelvic pain, urgency, vaginal bleeding and vaginal discharge.   Musculoskeletal:  Positive for arthralgias and myalgias. Negative for joint swelling.   Skin:  Negative for rash.   Neurological:  Negative for dizziness, weakness, headaches and paresthesias.   Psychiatric/Behavioral:  Negative for mood changes. The patient is not nervous/anxious.           OBJECTIVE:   /78 (BP Location: Right arm, Patient Position: Sitting, Cuff Size: Adult Regular)   Pulse 82   Temp 97.9  F (36.6  C) (Oral)   Resp 14   Ht 1.549 m (5' 1\")   Wt 71.5 kg (157 lb 9.6 oz)   SpO2 95%   BMI 29.78 kg/m    Physical Exam  GENERAL: healthy, alert and no distress  EYES: Eyes grossly normal to inspection, PERRL and conjunctivae and sclerae normal  HENT: ear canals and TM's normal, nose and mouth without ulcers or lesions  NECK: no adenopathy, no asymmetry, masses, or scars and thyroid normal to palpation  RESP: lungs clear to auscultation - no rales, rhonchi or wheezes  BREAST: normal without masses, tenderness or nipple discharge and no palpable axillary masses or adenopathy  CV: regular rate and rhythm, normal S1 S2, no S3 or S4, no murmur, click or rub, no peripheral edema and peripheral pulses strong  ABDOMEN: soft, nontender, no hepatosplenomegaly, no masses and bowel sounds normal  MS: no gross musculoskeletal defects noted, no edema  SKIN: no suspicious lesions or rashes  NEURO: Normal strength and tone, mentation intact and speech normal  PSYCH: mentation appears normal, affect normal/bright        ASSESSMENT/PLAN:   Geovanna was seen today for physical.    Diagnoses and all orders for this visit:    Routine general medical examination at [facility] care facility  We discussed healthy lifestyle, nutrition, cardiovascular risk reduction, self care, safety, sunscreen, and timing of cancer screening.  Health maintenance screening and immunizations reviewed with the patient.  Follow up yearly for the annual physical.    Anxiety  Continue hydroxyzine as needed well managed at this time.  Stable  -     hydrOXYzine (VISTARIL) 25 MG capsule; Take 1 capsule (25 mg) by mouth at bedtime  -     potassium chloride ER (K-TAB) 20 MEQ CR tablet; Take 3 tablets (60 mEq) by mouth daily    Essential hypertension  Blood pressure with good control today continue amlodipine 10 mg and losartan 100 mg.  Checking potassium as she has had a history of low potassium in the past currently taking 60 mg of potassium a day  -Also checking kidney function with BMP  -     amLODIPine (NORVASC) 10 MG tablet; Take 1 tablet (10 mg) by mouth daily  -     losartan (COZAAR) 100 MG tablet; Take 1 tablet (100 mg) by mouth daily  -     CBC with platelets; Future  -     Basic metabolic panel  (Ca, Cl, CO2, Creat, Gluc, K, Na, BUN); Future    Hyperlipidemia LDL goal <100  Discussed diagnosis of hyperlipidemia the patient has not had any discussion with this from a previous provider despite the fact that she is taking atorvastatin 20 mg daily.  Per review of chart LDL has been in the 170s as of November of last year.  We will recheck this fasting again this year with a goal LDL below 100  -     Lipid panel reflex to direct LDL Non-fasting; Future  -     atorvastatin (LIPITOR) 20 MG tablet; Take 2 tablets (40 mg) by mouth daily    Hypokalemia  Patient with a history of hypokalemia required 6 requiring 60 mg a day of potassium.  Rechecking BMP.  Denies palpitations, chest pain, shortness of breath.  No history of arrhythmias  -     Basic metabolic panel  (Ca, Cl, CO2, Creat, Gluc, K, Na, BUN); Future    Prediabetes  Patient with prediabetes reviewed previous hemoglobin A1c's as well as diet and exercise  "changes.  We will recheck hemoglobin A1c today is been over 3 months since her last check.  Currently not taking any medications of this but does have family history  -     Hemoglobin A1c; Future    Chronic neuropathic pain  Patient with neuropathic pain with normal upper extremity EMG and per patient review normal MRI of the spine.  Patient percent signed a release of information from [facility] who has been prescribing her gabapentin and I will review the MRI at that point.  Patient has not been to see neurology in a follow-up since May.  I will check inflammation labs and vitamin B12 to rule out other possible causes of neuropathic pain.  Discussed with patient that she should follow-up with me in the event that they are doing further work-up for neuropathic pain.  Continue gabapentin as prescribed from Ortho 3 times a day.  Patient seeing good result with this medication although discussed with patient that this is not a curative medication but should treat symptoms management    Patient instructed to send schedule follow-up as needed  -     Vitamin B12; Future  -     CRP, inflammation; Future  -     ESR: Erythrocyte sedimentation rate; Future    Other orders  -     REVIEW OF HEALTH MAINTENANCE PROTOCOL ORDERS  -     PRIMARY CARE FOLLOW-UP SCHEDULING; Future    Schedule fasting labs next Wednesday and Flu and Covid.     Patient to follow-up with me every 6 months or sooner as needed for acute concerns    Spent 30mins beyond the preventative visit doing chart review, history and exam, patient education, documentation and further activities for an acute concern per the note.      Patient has been advised of split billing requirements and indicates understanding: Yes      COUNSELING:  Reviewed preventive health counseling, as reflected in patient instructions      BMI:   Estimated body mass index is 29.78 kg/m  as calculated from the following:    Height as of this encounter: 1.549 m (5' 1\").    Weight as of this " encounter: 71.5 kg (157 lb 9.6 oz).         She reports that she has never smoked. She has never been exposed to tobacco smoke. She has never used smokeless tobacco.          GILLES Canseco CNP [facility]

## 2023-10-18 NOTE — Clinical Note
Colonoscopy 9/16/2020 - care everywhere  Mammo done 5/17/2023 - care everywhere 10/3/20 pap/HPV [facility] - [facility]

## 2023-10-25 ENCOUNTER — LAB (OUTPATIENT)
Dept: LAB | Facility: CLINIC | Age: 58
End: 2023-10-25
Payer: COMMERCIAL

## 2023-10-25 ENCOUNTER — ALLIED HEALTH/NURSE VISIT (OUTPATIENT)
Dept: FAMILY MEDICINE | Facility: CLINIC | Age: 58
End: 2023-10-25
Payer: COMMERCIAL

## 2023-10-25 DIAGNOSIS — M79.2 CHRONIC NEUROPATHIC PAIN: ICD-10-CM

## 2023-10-25 DIAGNOSIS — R73.03 PREDIABETES: ICD-10-CM

## 2023-10-25 DIAGNOSIS — Z23 NEED FOR COVID-19 VACCINE: Primary | ICD-10-CM

## 2023-10-25 DIAGNOSIS — E78.5 HYPERLIPIDEMIA LDL GOAL <100: ICD-10-CM

## 2023-10-25 DIAGNOSIS — G89.29 CHRONIC NEUROPATHIC PAIN: ICD-10-CM

## 2023-10-25 DIAGNOSIS — I10 ESSENTIAL HYPERTENSION: ICD-10-CM

## 2023-10-25 DIAGNOSIS — E87.6 HYPOKALEMIA: ICD-10-CM

## 2023-10-25 DIAGNOSIS — Z23 NEED FOR INFLUENZA VACCINATION: ICD-10-CM

## 2023-10-25 LAB
ANION GAP SERPL CALCULATED.3IONS-SCNC: 9 MMOL/L (ref 7–15)
BUN SERPL-MCNC: 15.6 MG/DL (ref 6–20)
CALCIUM SERPL-MCNC: 9.6 MG/DL (ref 8.6–10)
CHLORIDE SERPL-SCNC: 104 MMOL/L (ref 98–107)
CHOLEST SERPL-MCNC: 186 MG/DL
CREAT SERPL-MCNC: 0.65 MG/DL (ref 0.51–0.95)
CRP SERPL-MCNC: 7.13 MG/L
DEPRECATED HCO3 PLAS-SCNC: 28 MMOL/L (ref 22–29)
EGFRCR SERPLBLD CKD-EPI 2021: >90 ML/MIN/1.73M2
ERYTHROCYTE [DISTWIDTH] IN BLOOD BY AUTOMATED COUNT: 12.3 % (ref 10–15)
ERYTHROCYTE [SEDIMENTATION RATE] IN BLOOD BY WESTERGREN METHOD: 18 MM/HR (ref 0–30)
GLUCOSE SERPL-MCNC: 106 MG/DL (ref 70–99)
HBA1C MFR BLD: 6.1 % (ref 0–5.6)
HCT VFR BLD AUTO: 44.1 % (ref 35–47)
HDLC SERPL-MCNC: 43 MG/DL
HGB BLD-MCNC: 14.5 G/DL (ref 11.7–15.7)
LDLC SERPL CALC-MCNC: 109 MG/DL
MCH RBC QN AUTO: 28.9 PG (ref 26.5–33)
MCHC RBC AUTO-ENTMCNC: 32.9 G/DL (ref 31.5–36.5)
MCV RBC AUTO: 88 FL (ref 78–100)
NONHDLC SERPL-MCNC: 143 MG/DL
PLATELET # BLD AUTO: 379 10E3/UL (ref 150–450)
POTASSIUM SERPL-SCNC: 3.9 MMOL/L (ref 3.4–5.3)
RBC # BLD AUTO: 5.01 10E6/UL (ref 3.8–5.2)
SODIUM SERPL-SCNC: 141 MMOL/L (ref 135–145)
TRIGL SERPL-MCNC: 170 MG/DL
VIT B12 SERPL-MCNC: 712 PG/ML (ref 232–1245)
WBC # BLD AUTO: 9.8 10E3/UL (ref 4–11)

## 2023-10-25 PROCEDURE — 85027 COMPLETE CBC AUTOMATED: CPT

## 2023-10-25 PROCEDURE — 90686 IIV4 VACC NO PRSV 0.5 ML IM: CPT

## 2023-10-25 PROCEDURE — 99207 PR NO CHARGE NURSE ONLY: CPT

## 2023-10-25 PROCEDURE — 83036 HEMOGLOBIN GLYCOSYLATED A1C: CPT

## 2023-10-25 PROCEDURE — 91320 SARSCV2 VAC 30MCG TRS-SUC IM: CPT

## 2023-10-25 PROCEDURE — 80061 LIPID PANEL: CPT

## 2023-10-25 PROCEDURE — 90480 ADMN SARSCOV2 VAC 1/ONLY CMP: CPT

## 2023-10-25 PROCEDURE — 90471 IMMUNIZATION ADMIN: CPT

## 2023-10-25 PROCEDURE — 86140 C-REACTIVE PROTEIN: CPT

## 2023-10-25 PROCEDURE — 85652 RBC SED RATE AUTOMATED: CPT

## 2023-10-25 PROCEDURE — 80048 BASIC METABOLIC PNL TOTAL CA: CPT

## 2023-10-25 PROCEDURE — 82607 VITAMIN B-12: CPT

## 2023-10-25 PROCEDURE — 36415 COLL VENOUS BLD VENIPUNCTURE: CPT

## 2023-10-26 ENCOUNTER — TELEPHONE (OUTPATIENT)
Dept: FAMILY MEDICINE | Facility: CLINIC | Age: 58
End: 2023-10-26
Payer: COMMERCIAL

## 2023-10-26 DIAGNOSIS — R73.03 PREDIABETES: Primary | ICD-10-CM

## 2023-10-26 NOTE — TELEPHONE ENCOUNTER
----- Message from GILLES Canseco CNP sent at 10/25/2023 11:31 PM CDT -----  Low risk of cardiac event related to cholesterol - no treatment needed at this time beyond current atorvastatin 20mg .     Your CBC (complete blood count) which looks at your hemoglobin and other blood cell types looks good- no concerns for anemia or infection.    A1c shows worsening prediabetes  - please decrease carbohydrates and sugar.  If unable to change diet or increase physical activity I do recommend metformin start to prevent diabetes - 500mg once/day with breakfast.  Please ask pt if she would like this.    Normal vitamin B12 - this is nor causing your neuropathy, CPR slightly elevated but not specific enough by itself to determine cause of this. Sed rate (another form of inflammation) is not elevated.

## 2023-10-26 NOTE — RESULT ENCOUNTER NOTE
Low risk of cardiac event related to cholesterol - no treatment needed at this time beyond current atorvastatin 20mg .     Your CBC (complete blood count) which looks at your hemoglobin and other blood cell types looks good- no concerns for anemia or infection.    A1c shows worsening prediabetes  - please decrease carbohydrates and sugar.  If unable to change diet or increase physical activity I do recommend metformin start to prevent diabetes - 500mg once/day with breakfast.  Please ask pt if she would like this.    Normal vitamin B12 - this is nor causing your neuropathy, CPR slightly elevated but not specific enough by itself to determine cause of this. Sed rate (another form of inflammation) is not elevated.

## 2023-10-26 NOTE — TELEPHONE ENCOUNTER
Left message to return call. Please relay provider's message to pt when she returns call or route to RN queue to relay message.    Mitzi Reno, TRUPTIN, RN  [facility]

## 2023-10-26 NOTE — TELEPHONE ENCOUNTER
Pt returned call, message relayed. She would like to begin metformin 500mg daily. Requesting it be sent to [facility] in [place]. Please assist pt with scheduling follow up labs appointments.

## 2023-11-30 DIAGNOSIS — R73.03 PREDIABETES: ICD-10-CM

## 2023-11-30 NOTE — TELEPHONE ENCOUNTER
Pt is requesting new rx for a 3 month supply. [place]. Thank you!!

## 2023-12-11 ENCOUNTER — TELEPHONE (OUTPATIENT)
Dept: FAMILY MEDICINE | Facility: CLINIC | Age: 58
End: 2023-12-11
Payer: COMMERCIAL

## 2023-12-11 DIAGNOSIS — E78.5 HYPERLIPIDEMIA LDL GOAL <100: ICD-10-CM

## 2023-12-11 NOTE — TELEPHONE ENCOUNTER
We received rx for atorvastatin with dose of (2x20mg tabs daily) insurance will only cover 1 tab per day, and pt states she's taking only 1x20mg tab per day. Please clarify if pt should be on 20mg every day or 40mg every day. Please resend rx to pharmacy. Thanks you, Janel Gotti [facility]

## 2023-12-12 RX ORDER — ATORVASTATIN CALCIUM 20 MG/1
20 TABLET, FILM COATED ORAL DAILY
Qty: 90 TABLET | Refills: 3 | Status: SHIPPED | OUTPATIENT
Start: 2023-12-12

## 2023-12-13 DIAGNOSIS — F41.9 ANXIETY: ICD-10-CM

## 2023-12-14 RX ORDER — HYDROXYZINE PAMOATE 25 MG/1
25 CAPSULE ORAL AT BEDTIME
Qty: 30 CAPSULE | Refills: 1 | Status: SHIPPED | OUTPATIENT
Start: 2023-12-14 | End: 2024-05-06

## 2024-01-10 ENCOUNTER — PATIENT OUTREACH (OUTPATIENT)
Dept: GASTROENTEROLOGY | Facility: CLINIC | Age: 59
End: 2024-01-10
Payer: COMMERCIAL

## 2024-03-05 DIAGNOSIS — R73.03 PREDIABETES: ICD-10-CM

## 2024-03-11 DIAGNOSIS — F41.9 ANXIETY: ICD-10-CM

## 2024-03-11 DIAGNOSIS — E87.6 HYPOKALEMIA: Primary | ICD-10-CM

## 2024-03-14 RX ORDER — POTASSIUM CHLORIDE 1500 MG/1
60 TABLET, EXTENDED RELEASE ORAL DAILY
Qty: 270 TABLET | Refills: 1 | Status: SHIPPED | OUTPATIENT
Start: 2024-03-14 | End: 2024-03-22

## 2024-03-14 NOTE — TELEPHONE ENCOUNTER
Please instruct pt to Please hold potassium tablets for 5 days and come in for lab only visit. Lab should be In the morning before 9 am    This dose of potassium is very high for not being on a medication that lowers potassium significantly.  I'm doing further labs to determine cause of this. -please call patient to schedule visit with me for Hypokalemia follow up in next three weeks - may use any blocked spot. Thank you

## 2024-03-14 NOTE — TELEPHONE ENCOUNTER
Provider message were relayed to patient and lab appointment were schedule for 03/20/2024.  Tatum Alfaro MA on 3/14/2024 at 10:13 AM

## 2024-03-20 ENCOUNTER — LAB (OUTPATIENT)
Dept: LAB | Facility: CLINIC | Age: 59
End: 2024-03-20
Payer: COMMERCIAL

## 2024-03-20 DIAGNOSIS — E87.6 HYPOKALEMIA: ICD-10-CM

## 2024-03-20 LAB
ANION GAP SERPL CALCULATED.3IONS-SCNC: 12 MMOL/L (ref 7–15)
BUN SERPL-MCNC: 19.2 MG/DL (ref 6–20)
CALCIUM SERPL-MCNC: 9.3 MG/DL (ref 8.6–10)
CHLORIDE SERPL-SCNC: 103 MMOL/L (ref 98–107)
CORTIS SERPL-MCNC: 9.7 UG/DL
CREAT SERPL-MCNC: 0.72 MG/DL (ref 0.51–0.95)
DEPRECATED HCO3 PLAS-SCNC: 27 MMOL/L (ref 22–29)
EGFRCR SERPLBLD CKD-EPI 2021: >90 ML/MIN/1.73M2
FOLATE SERPL-MCNC: 17.1 NG/ML (ref 4.6–34.8)
GLUCOSE SERPL-MCNC: 109 MG/DL (ref 70–99)
MAGNESIUM SERPL-MCNC: 1.9 MG/DL (ref 1.7–2.3)
POTASSIUM SERPL-SCNC: 3 MMOL/L (ref 3.4–5.3)
SODIUM SERPL-SCNC: 142 MMOL/L (ref 135–145)

## 2024-03-20 PROCEDURE — 82746 ASSAY OF FOLIC ACID SERUM: CPT

## 2024-03-20 PROCEDURE — 99000 SPECIMEN HANDLING OFFICE-LAB: CPT

## 2024-03-20 PROCEDURE — 80048 BASIC METABOLIC PNL TOTAL CA: CPT

## 2024-03-20 PROCEDURE — 36415 COLL VENOUS BLD VENIPUNCTURE: CPT

## 2024-03-20 PROCEDURE — 82088 ASSAY OF ALDOSTERONE: CPT

## 2024-03-20 PROCEDURE — 84244 ASSAY OF RENIN: CPT | Mod: 90

## 2024-03-20 PROCEDURE — 82533 TOTAL CORTISOL: CPT

## 2024-03-20 PROCEDURE — 83735 ASSAY OF MAGNESIUM: CPT

## 2024-03-22 DIAGNOSIS — E26.9 HIGH ALDOSTERONE (H): ICD-10-CM

## 2024-03-22 DIAGNOSIS — F41.9 ANXIETY: ICD-10-CM

## 2024-03-22 DIAGNOSIS — I10 ESSENTIAL HYPERTENSION: Primary | ICD-10-CM

## 2024-03-22 DIAGNOSIS — E87.6 HYPOKALEMIA: ICD-10-CM

## 2024-03-22 LAB — ALDOST SERPL-MCNC: 44.2 NG/DL (ref 0–31)

## 2024-03-22 RX ORDER — POTASSIUM CHLORIDE 1500 MG/1
60 TABLET, EXTENDED RELEASE ORAL DAILY
Qty: 270 TABLET | Refills: 1 | Status: SHIPPED | OUTPATIENT
Start: 2024-03-22

## 2024-03-22 NOTE — RESULT ENCOUNTER NOTE
Referral placed to Endocrine for high aldosterone and low potassium    Pt had been holding her K supplement for 5 days before getting test done.   Potassium was very low.  Will restart potassium chloride ER 20 mill equivalents x 3 tablets daily.    Refill sent to [facility] pharmacy in [place]

## 2024-03-23 LAB — RENIN PLAS-CCNC: 0.8 NG/ML/HR

## 2024-03-25 LAB — ALDOST/RENIN PLAS-RTO: 55.3 {RATIO} (ref 0–25)

## 2024-03-27 ENCOUNTER — TELEPHONE (OUTPATIENT)
Dept: FAMILY MEDICINE | Facility: CLINIC | Age: 59
End: 2024-03-27

## 2024-03-27 ENCOUNTER — NURSE TRIAGE (OUTPATIENT)
Dept: FAMILY MEDICINE | Facility: CLINIC | Age: 59
End: 2024-03-27

## 2024-03-27 ENCOUNTER — OFFICE VISIT (OUTPATIENT)
Dept: FAMILY MEDICINE | Facility: CLINIC | Age: 59
End: 2024-03-27
Payer: COMMERCIAL

## 2024-03-27 VITALS
TEMPERATURE: 97.8 F | BODY MASS INDEX: 31.37 KG/M2 | DIASTOLIC BLOOD PRESSURE: 86 MMHG | HEART RATE: 77 BPM | OXYGEN SATURATION: 97 % | WEIGHT: 166 LBS | RESPIRATION RATE: 16 BRPM | SYSTOLIC BLOOD PRESSURE: 120 MMHG

## 2024-03-27 DIAGNOSIS — J06.9 VIRAL URI WITH COUGH: Primary | ICD-10-CM

## 2024-03-27 LAB
FLUAV RNA SPEC QL NAA+PROBE: NEGATIVE
FLUBV RNA RESP QL NAA+PROBE: NEGATIVE
RSV RNA SPEC NAA+PROBE: NEGATIVE
SARS-COV-2 RNA RESP QL NAA+PROBE: NEGATIVE

## 2024-03-27 PROCEDURE — 99213 OFFICE O/P EST LOW 20 MIN: CPT | Performed by: NURSE PRACTITIONER

## 2024-03-27 PROCEDURE — 87637 SARSCOV2&INF A&B&RSV AMP PRB: CPT | Performed by: NURSE PRACTITIONER

## 2024-03-27 RX ORDER — CHOLECALCIFEROL (VITAMIN D3) 25 MCG
1 TABLET ORAL DAILY
COMMUNITY

## 2024-03-27 RX ORDER — ALBUTEROL SULFATE 90 UG/1
2 AEROSOL, METERED RESPIRATORY (INHALATION) EVERY 6 HOURS PRN
Qty: 18 G | Refills: 0 | Status: SHIPPED | OUTPATIENT
Start: 2024-03-27 | End: 2024-05-01

## 2024-03-27 NOTE — TELEPHONE ENCOUNTER
"S-(situation): Patient called reporting wheezing and cough    B-(background): Pt reports new onset of wheezing, chest congestion, cough, and mild shortness of breath starting yesterday. Notes worsening wheezing at night.     A-(assessment): Denies any fever, chest pain. Unable to take O2 at home.    R-(recommendations): Advised patient be seen in clinic for evaluation. Patient is agreeable to this and is scheduled to be seen at 3:40pm today with Patricia Bentley.       Reason for Disposition   MILD difficulty breathing (e.g., minimal/no SOB at rest, SOB with walking, pulse < 100) of new-onset or worse than normal    Answer Assessment - Initial Assessment Questions  1. RESPIRATORY STATUS: \"Describe your breathing?\" (e.g., wheezing, shortness of breath, unable to speak, severe coughing)       Wheezing  2. ONSET: \"When did this breathing problem begin?\"       Yesterday  3. PATTERN \"Does the difficult breathing come and go, or has it been constant since it started?\"       Comes and goes -  worse at night  4. SEVERITY: \"How bad is your breathing?\" (e.g., mild, moderate, severe)     - MILD: No SOB at rest, mild SOB with walking, speaks normally in sentences, can lie down, no retractions, pulse < 100.     - MODERATE: SOB at rest, SOB with minimal exertion and prefers to sit, cannot lie down flat, speaks in phrases, mild retractions, audible wheezing, pulse 100-120.     - SEVERE: Very SOB at rest, speaks in single words, struggling to breathe, sitting hunched forward, retractions, pulse > 120       Mild to moderate  5. RECURRENT SYMPTOM: \"Have you had difficulty breathing before?\" If Yes, ask: \"When was the last time?\" and \"What happened that time?\"       No  6. CARDIAC HISTORY: \"Do you have any history of heart disease?\" (e.g., heart attack, angina, bypass surgery, angioplasty)       HTN  7. LUNG HISTORY: \"Do you have any history of lung disease?\"  (e.g., pulmonary embolus, asthma, emphysema)      none  8. CAUSE: \"What do you " "think is causing the breathing problem?\"       Unsure - if it is related to working in cold weather  9. OTHER SYMPTOMS: \"Do you have any other symptoms? (e.g., dizziness, runny nose, cough, chest pain, fever)      Chest congestion, cough  10. O2 SATURATION MONITOR:  \"Do you use an oxygen saturation monitor (pulse oximeter) at home?\" If Yes, ask: \"What is your reading (oxygen level) today?\" \"What is your usual oxygen saturation reading?\" (e.g., 95%)          11. PREGNANCY: \"Is there any chance you are pregnant?\" \"When was your last menstrual period?\"          12. TRAVEL: \"Have you traveled out of the country in the last month?\" (e.g., travel history, exposures)    Protocols used: Breathing Difficulty-A-[place]    "

## 2024-03-27 NOTE — PROGRESS NOTES
Assessment & Plan     Viral URI with cough  Symptoms likely viral.  Vitals are stable and patient appears well.  No concern for influenza or COVID, but patient would like to be checked.  Testing in process.  I do not think antibiotics are warranted at this time.  Prescribed albuterol to use as needed for wheezing.  She may want to sleep with an elevated head of bed.  Recommend rest and plenty of fluids.  Discussed symptoms that would warrant follow-up.  - albuterol (PROAIR HFA/PROVENTIL HFA/VENTOLIN HFA) 108 (90 Base) MCG/ACT inhaler  Dispense: 18 g; Refill: 0  - Symptomatic Influenza A/B, RSV, & SARS-CoV2 PCR (COVID-19) Nasopharyngeal        Didi Austin is a 58 year old who presents with a cough and wheezing that started last night.  Patient was feeling fine during the day.  Associated symptoms include a mild sore throat and shortness of breath.  Denies any fever, chills, or bodyaches.  No ear pain or significant sinus congestion/runny nose.  She believes symptoms are triggered by being out in the snow on Sunday.  No history of asthma or smoking.  Over-the-counter treatments include Vicks VapoRub and a Vicks inhaler.    Cough (Started last night ; wheezing ; chest pain ; was at a memorial on Sunday and thinks maybe due to being outdoor and got sick )    History of Present Illness       Reason for visit:  Coughing with chest pain and braxton  Symptom onset:  1-3 days ago  Symptom intensity:  Moderate  Symptom progression:  Worsening  Had these symptoms before:  Yes  Has tried/received treatment for these symptoms:  Yes  Previous treatment was successful:  Yes  Prior treatment description:  They prescribed me medicine to take it  What makes it worse:  Last night i have braxton and i cough and have chest pain and difficulty to sleep due to chest pain and braxton  What makes it better:  Last night i pot vicks in my chest and use inhaler it helps me    She eats 2-3 servings of fruits and vegetables daily.She consumes 3  sweetened beverage(s) daily.She exercises with enough effort to increase her heart rate 20 to 29 minutes per day.  She exercises with enough effort to increase her heart rate 3 or less days per week.   She is taking medications regularly.           Review of Systems  Pertinent items in HPI        Objective    /86 (BP Location: Right arm, Patient Position: Sitting, Cuff Size: Adult Regular)   Pulse 77   Temp 97.8  F (36.6  C) (Temporal)   Resp 16   Wt 75.3 kg (166 lb)   SpO2 97%   BMI 31.37 kg/m    Body mass index is 31.37 kg/m .  Physical Exam   GENERAL: alert and no distress  EYES: Eyes grossly normal to inspection, PERRL and conjunctivae and sclerae normal  HENT: ear canals and TM's normal, nose and mouth without ulcers or lesions  NECK: no adenopathy, no asymmetry, masses, or scars  RESP: lungs clear to auscultation - no rales, rhonchi or wheezes  CV: regular rate and rhythm, normal S1 S2, no S3 or S4, no murmur, click or rub, no peripheral edema             Signed Electronically by: Patricia Bentley NP

## 2024-03-27 NOTE — TELEPHONE ENCOUNTER
FYI - Status Update    Who is Calling: patient    Update: pt could not get an appt until November with Endocrine specialist.  Pt wants to know if this is acceptabe for her to wait; wants to make sure there is nothing Urgent with her levels where she should maybe be seen sooner.    Please advise and have pt contacted with provider message.    Does caller want a call/response back: Yes     Could we send this information to you in [place] or would you prefer to receive a phone call?:   Patient would prefer a phone call   Okay to leave a detailed message?: Yes at Cell number on file:    Telephone Information:   Mobile [phone]

## 2024-03-28 NOTE — TELEPHONE ENCOUNTER
Provider Response to 2nd Level Triage Request    I have reviewed the RN documentation. My recommendation is:  Reviewed visit notes from yesterday.

## 2024-04-05 NOTE — TELEPHONE ENCOUNTER
Telephone call to Geovanna tru a voice message on her listed preferred phone number     BP well controlled, K is normal with supplementation.     Discussed plan is to continue to see endocrine.  Patient may see me sooner for further workup and management until then.  Patient to schedule appointment if she would like.

## 2024-04-08 ENCOUNTER — TELEPHONE (OUTPATIENT)
Dept: FAMILY MEDICINE | Facility: CLINIC | Age: 59
End: 2024-04-08
Payer: COMMERCIAL

## 2024-04-08 NOTE — TELEPHONE ENCOUNTER
Pt returning PCP's call.    Relayed provider message from 04/05/2024.         Telephone call to Geovanna ott a voice message on her listed preferred phone number      BP well controlled, K is normal with supplementation.      Discussed plan is to continue to see endocrine.  Patient may see me sooner for further workup and management until then.  Patient to schedule appointment if she would like.        Assisted in scheduling OV with PCP for follow up. Pt's endocrinology tete is not until 11/2024.    Nieves Desouza RN

## 2024-05-01 ENCOUNTER — OFFICE VISIT (OUTPATIENT)
Dept: FAMILY MEDICINE | Facility: CLINIC | Age: 59
End: 2024-05-01
Payer: COMMERCIAL

## 2024-05-01 VITALS
DIASTOLIC BLOOD PRESSURE: 84 MMHG | HEIGHT: 61 IN | WEIGHT: 163.4 LBS | RESPIRATION RATE: 16 BRPM | OXYGEN SATURATION: 95 % | BODY MASS INDEX: 30.85 KG/M2 | HEART RATE: 77 BPM | TEMPERATURE: 98.2 F | SYSTOLIC BLOOD PRESSURE: 118 MMHG

## 2024-05-01 DIAGNOSIS — E26.9 HIGH ALDOSTERONE (H): Primary | ICD-10-CM

## 2024-05-01 DIAGNOSIS — Z12.31 ENCOUNTER FOR SCREENING MAMMOGRAM FOR BREAST CANCER: ICD-10-CM

## 2024-05-01 DIAGNOSIS — R06.2 WHEEZE: ICD-10-CM

## 2024-05-01 DIAGNOSIS — R73.03 PREDIABETES: ICD-10-CM

## 2024-05-01 DIAGNOSIS — E78.5 HYPERLIPIDEMIA LDL GOAL <100: ICD-10-CM

## 2024-05-01 PROCEDURE — 90471 IMMUNIZATION ADMIN: CPT | Performed by: NURSE PRACTITIONER

## 2024-05-01 PROCEDURE — 99214 OFFICE O/P EST MOD 30 MIN: CPT | Mod: 25 | Performed by: NURSE PRACTITIONER

## 2024-05-01 PROCEDURE — 90750 HZV VACC RECOMBINANT IM: CPT | Performed by: NURSE PRACTITIONER

## 2024-05-01 RX ORDER — ALBUTEROL SULFATE 90 UG/1
2 AEROSOL, METERED RESPIRATORY (INHALATION) EVERY 6 HOURS PRN
Qty: 18 G | Refills: 3 | Status: SHIPPED | OUTPATIENT
Start: 2024-05-01

## 2024-05-01 NOTE — PROGRESS NOTES
Encounter for screening mammogram for breast cancer  Order placed for mammogram screening done 1 year prior screening  - MA Screen Bilateral w/[facility]    Wheeze  Refilling albuterol  - albuterol (PROAIR HFA/PROVENTIL HFA/VENTOLIN HFA) 108 (90 Base) MCG/ACT inhaler  Dispense: 18 g; Refill: 3    Anxiety  Continue hydroxyzine as needed well managed at this time.  Stable  -     hydrOXYzine (VISTARIL) 25 MG capsule; Take 1 capsule (25 mg) by mouth at bedtime  -     potassium chloride ER (K-TAB) 20 MEQ CR tablet; Take 3 tablets (60 mEq) by mouth daily    Essential hypertension  Blood pressure with good control today continue amlodipine 10 mg and losartan 100 mg.  Continue 60 mg of potassium a day  -Also checking kidney function with BMP  -     amLODIPine (NORVASC) 10 MG tablet; Take 1 tablet (10 mg) by mouth daily  -     losartan (COZAAR) 100 MG tablet; Take 1 tablet (100 mg) by mouth daily  -     CBC with platelets; Future  -     Basic metabolic panel  (Ca, Cl, CO2, Creat, Gluc, K, Na, BUN); Future    Hyperlipidemia LDL goal <100  LDL October 2023 is 109  Rechecking cholesterol panel continue atorvastatin 20  -     Lipid panel reflex to direct LDL Non-fasting; Future  -     atorvastatin (LIPITOR) 20 MG tablet; Take 2 tablets (40 mg) by mouth daily    Hypokalemia/high aldosterone renin ratio  Referral submitted endocrinology however is unable to get in until November of this year.  Patient with a history of hypokalemia required 6 requiring 60 mg a day of potassium.  Rechecking BMP.  Patient does have paresthesias as well as weakness in her muscles and often feels dehydrated in the morning, also does have very high blood pressure but managed with medications.  Denies palpitations, chest pain, shortness of breath.  No history of arrhythmias  -Will check aldosterone and renin (PRA)  -Plasma renin activity- PRA is expressed as the amount of angiotensin I generated per unit of time.   -when screening women receiving  "estrogen-containing preparations for the presence of primary aldosteronism by aldosterone/renin ratio testing, false-positive ratios can occur when renin is measured as direct renin concentration, but not when it is measured as PRA     -     Basic metabolic panel  (Ca, Cl, CO2, Creat, Gluc, K, Na, BUN); Future    Prediabetes  Patient with prediabetes reviewed previous hemoglobin A1c's as well as diet and exercise changes.  Has been taking metformin since December 2023 -1 tablet daily    -     Hemoglobin A1c; Future    Chronic neuropathic pain  Continue independent 3 times a day doing well with this reviewed MRIs of her spine.  Is likely that her neuropathic pain is secondary to her   Hyperaldosteronism    Patient to follow-up with me in 1 month    Spent 30mins beyond the preventative visit doing chart review, history and exam, patient education, documentation and further activities for an acute concern per the note.    Assays of the renin-angiotensin-aldosterone system in adrenal disease - UpToDate   Will schedule Wednesday 10:30 lab test                 BMI  Estimated body mass index is 30.46 kg/m  as calculated from the following:    Height as of this encounter: 1.56 m (5' 1.42\").    Weight as of this encounter: 74.1 kg (163 lb 6.4 oz).   Weight management plan: Discussed healthy diet and exercise guidelines          Didi Austin is a 58 year old, presenting for the following health issues:  Follow Up (Follow on med, potassium K+, wheezing, family history cancer and endo appointment in 11/2024 )        5/1/2024    11:19 AM   Additional Questions   Roomed by SAAD-LPN   Accompanied by NONE     History of Present Illness       Diabetes:   She presents for follow up of diabetes.    She is not checking blood glucose.        She is concerned about other.   She is having excessive thirst.            Hyperlipidemia:  She presents for follow up of hyperlipidemia.   She is taking medication to lower cholesterol. She " "is having myalgia or other side effects to statin medications.    Hypertension: She presents for follow up of hypertension.  She does check blood pressure  regularly outside of the clinic. Outpatient blood pressures have not been over 140/90. She does not follow a low salt diet.     Reason for visit:  Prevention And cancer screening for my ovarian cancer and brest cancer    She eats 2-3 servings of fruits and vegetables daily.She consumes 4 sweetened beverage(s) daily.She exercises with enough effort to increase her heart rate 20 to 29 minutes per day.  She exercises with enough effort to increase her heart rate 3 or less days per week.   She is taking medications regularly.     Reviewed previous labs with patient today.  Patient says that her numbness and tingling improves with the use of gabapentin.  Taking metformin daily for prediabetes    She is taking 60 mg daily of potassium -feels her muscle weakness is improved since being on this, no headache    Hyperaldosteroneism   -Reviewed diagnosis with her.  Patient with very dry in the morning.  A lot of times in the morning for stiffness in her limbs and joints    Patient taking hydroxyzine at night to help with sleep.  Feels that this helps her sleep through the night    She is taking atorvastatin as prescribed daily -has been trying to eat more vegetables and fruits more fiber and less meats denies chest pain, palpitations, shortness of breath    Taking amlodipine 10 mg losartan 100 mg.  Pressure today was normal -denies lower extremity edema    Would like to get her mammogram repeated    Would like to get albuterol refilled feels that when she is exercising very strenuously she does have wheezing.  Denies wheezing, cough, history of asthma                  Objective    /84   Pulse 77   Temp 98.2  F (36.8  C) (Oral)   Resp 16   Ht 1.56 m (5' 1.42\")   Wt 74.1 kg (163 lb 6.4 oz)   SpO2 95%   BMI 30.46 kg/m    Body mass index is 30.46 kg/m .  Physical " Exam   GENERAL: alert and no distress  NECK: no adenopathy, no asymmetry, masses, or scars  RESP: lungs clear to auscultation - no rales, rhonchi or wheezes  CV: regular rate and rhythm, normal S1 S2, no S3 or S4, no murmur, click or rub, no peripheral edema  MS: no gross musculoskeletal defects noted, no edema            Signed Electronically by: GILLES Canseco CNP

## 2024-05-01 NOTE — PROGRESS NOTES
"  {PROVIDER CHARTING PREFERENCE:949727}    Didi Austin is a 58 year old, presenting for the following health issues:  Follow Up (Follow on med, potassium K+, wheezing, family history cancer and endo appointment in 11/2024 )      5/1/2024    11:19 AM   Additional Questions   Roomed by SAAD-LPN   Accompanied by NONE     History of Present Illness       Diabetes:   She presents for follow up of diabetes.    She is not checking blood glucose.        She is concerned about other.   She is having excessive thirst.            Hyperlipidemia:  She presents for follow up of hyperlipidemia.   She is taking medication to lower cholesterol. She is having myalgia or other side effects to statin medications.    Hypertension: She presents for follow up of hypertension.  She does check blood pressure  regularly outside of the clinic. Outpatient blood pressures have not been over 140/90. She does not follow a low salt diet.     Reason for visit:  Prevention And cancer screening for my ovarian cancer and brest cancer    She eats 2-3 servings of fruits and vegetables daily.She consumes 4 sweetened beverage(s) daily.She exercises with enough effort to increase her heart rate 20 to 29 minutes per day.  She exercises with enough effort to increase her heart rate 3 or less days per week.   She is taking medications regularly.       {MA/LPN/RN Pre-Provider Visit Orders- hCG/UA/Strep (Optional):639780}  {SUPERLIST (Optional):136146}  {additonal problems for provider to add (Optional):780841}    {ROS Picklists (Optional):836535}      Objective    /84   Pulse 77   Temp 98.2  F (36.8  C) (Oral)   Resp 16   Ht 1.56 m (5' 1.42\")   Wt 74.1 kg (163 lb 6.4 oz)   SpO2 95%   BMI 30.46 kg/m    Body mass index is 30.46 kg/m .  Physical Exam   {Exam List (Optional):683495}    {Diagnostic Test Results (Optional):258314}        Signed Electronically by: GILLES Canseco CNP  {Email feedback regarding this note to " [email or web address]   :[id number]}

## 2024-05-06 DIAGNOSIS — F41.9 ANXIETY: ICD-10-CM

## 2024-05-06 RX ORDER — HYDROXYZINE PAMOATE 25 MG/1
25 CAPSULE ORAL AT BEDTIME
Qty: 30 CAPSULE | Refills: 1 | Status: SHIPPED | OUTPATIENT
Start: 2024-05-06

## 2024-05-08 ENCOUNTER — LAB (OUTPATIENT)
Dept: LAB | Facility: CLINIC | Age: 59
End: 2024-05-08
Payer: COMMERCIAL

## 2024-05-08 DIAGNOSIS — Z11.4 SCREENING FOR HIV (HUMAN IMMUNODEFICIENCY VIRUS): Primary | ICD-10-CM

## 2024-05-08 DIAGNOSIS — R73.03 PREDIABETES: ICD-10-CM

## 2024-05-08 DIAGNOSIS — E78.5 HYPERLIPIDEMIA LDL GOAL <100: ICD-10-CM

## 2024-05-08 DIAGNOSIS — E26.9 HIGH ALDOSTERONE (H): ICD-10-CM

## 2024-05-08 DIAGNOSIS — Z11.59 NEED FOR HEPATITIS C SCREENING TEST: ICD-10-CM

## 2024-05-08 LAB — HBA1C MFR BLD: 6.2 % (ref 0–5.6)

## 2024-05-08 PROCEDURE — 36415 COLL VENOUS BLD VENIPUNCTURE: CPT

## 2024-05-08 PROCEDURE — 80061 LIPID PANEL: CPT

## 2024-05-08 PROCEDURE — 87389 HIV-1 AG W/HIV-1&-2 AB AG IA: CPT

## 2024-05-08 PROCEDURE — 82088 ASSAY OF ALDOSTERONE: CPT

## 2024-05-08 PROCEDURE — 83036 HEMOGLOBIN GLYCOSYLATED A1C: CPT

## 2024-05-08 PROCEDURE — 86803 HEPATITIS C AB TEST: CPT

## 2024-05-08 PROCEDURE — 80048 BASIC METABOLIC PNL TOTAL CA: CPT

## 2024-05-08 PROCEDURE — 99000 SPECIMEN HANDLING OFFICE-LAB: CPT

## 2024-05-08 PROCEDURE — 84244 ASSAY OF RENIN: CPT | Mod: 90

## 2024-05-09 LAB
ANION GAP SERPL CALCULATED.3IONS-SCNC: 12 MMOL/L (ref 7–15)
BUN SERPL-MCNC: 16.3 MG/DL (ref 6–20)
CALCIUM SERPL-MCNC: 9.3 MG/DL (ref 8.6–10)
CHLORIDE SERPL-SCNC: 105 MMOL/L (ref 98–107)
CHOLEST SERPL-MCNC: 168 MG/DL
CREAT SERPL-MCNC: 0.62 MG/DL (ref 0.51–0.95)
DEPRECATED HCO3 PLAS-SCNC: 25 MMOL/L (ref 22–29)
EGFRCR SERPLBLD CKD-EPI 2021: >90 ML/MIN/1.73M2
FASTING STATUS PATIENT QL REPORTED: YES
FASTING STATUS PATIENT QL REPORTED: YES
GLUCOSE SERPL-MCNC: 107 MG/DL (ref 70–99)
HCV AB SERPL QL IA: NONREACTIVE
HDLC SERPL-MCNC: 43 MG/DL
HIV 1+2 AB+HIV1 P24 AG SERPL QL IA: NONREACTIVE
LDLC SERPL CALC-MCNC: 98 MG/DL
NONHDLC SERPL-MCNC: 125 MG/DL
POTASSIUM SERPL-SCNC: 4.1 MMOL/L (ref 3.4–5.3)
SODIUM SERPL-SCNC: 142 MMOL/L (ref 135–145)
TRIGL SERPL-MCNC: 137 MG/DL

## 2024-05-10 LAB
ALDOST SERPL-MCNC: 21.9 NG/DL (ref 0–31)
RENIN PLAS-CCNC: 0.2 NG/ML/HR

## 2024-05-15 NOTE — RESULT ENCOUNTER NOTE
Improvement seen in your potassium as well as your aldosterone level your LDL cholesterol also improved significantly.  Your hemoglobin A1c is slowly creeping up it is now 6.2% -please continue to have a diet low in carbohydrates and low in sugars.  Please increase your fiber intake by eating a lot of vegetables and whole grains as well as proteins.  Continue your metformin and we should check this in another 3 months     continue 60 mg of potassium a day    Please schedule follow-up for 3 months to recheck your diabetic level as well as your enzymes and labs.    Continue to plan to follow-up with endocrine at next available appointment    GILLES Canseco CNP on 5/15/2024 at 5:38 PM

## 2024-05-22 ENCOUNTER — OFFICE VISIT (OUTPATIENT)
Dept: FAMILY MEDICINE | Facility: CLINIC | Age: 59
End: 2024-05-22
Payer: COMMERCIAL

## 2024-05-22 VITALS
HEIGHT: 61 IN | WEIGHT: 161.7 LBS | TEMPERATURE: 98 F | BODY MASS INDEX: 30.53 KG/M2 | RESPIRATION RATE: 18 BRPM | DIASTOLIC BLOOD PRESSURE: 80 MMHG | OXYGEN SATURATION: 97 % | SYSTOLIC BLOOD PRESSURE: 126 MMHG | HEART RATE: 66 BPM

## 2024-05-22 DIAGNOSIS — G89.29 CHRONIC NEUROPATHIC PAIN: Primary | ICD-10-CM

## 2024-05-22 DIAGNOSIS — M79.2 CHRONIC NEUROPATHIC PAIN: Primary | ICD-10-CM

## 2024-05-22 DIAGNOSIS — R06.2 WHEEZE: ICD-10-CM

## 2024-05-22 DIAGNOSIS — I10 ESSENTIAL HYPERTENSION: ICD-10-CM

## 2024-05-22 DIAGNOSIS — E87.6 HYPOKALEMIA: ICD-10-CM

## 2024-05-22 DIAGNOSIS — R73.03 PREDIABETES: ICD-10-CM

## 2024-05-22 DIAGNOSIS — E26.9 HIGH ALDOSTERONE (H): ICD-10-CM

## 2024-05-22 PROCEDURE — 99213 OFFICE O/P EST LOW 20 MIN: CPT | Performed by: NURSE PRACTITIONER

## 2024-05-22 RX ORDER — METFORMIN HCL 500 MG
500 TABLET, EXTENDED RELEASE 24 HR ORAL
Qty: 90 TABLET | Refills: 3 | Status: SHIPPED | OUTPATIENT
Start: 2024-05-22

## 2024-05-22 NOTE — PROGRESS NOTES
Encounter for screening mammogram for breast cancer  Order placed for mammogram screening done 1 year prior screening  Gave pt phone number to call to schedule  - MA Screen Bilateral w/[facility] - placed 5/22/24    Wheeze  Refilling albuterol  -uses when feeling dry mouth - discussed what its used to today - use fo wheezing - not taking often  - albuterol (PROAIR HFA/PROVENTIL HFA/VENTOLIN HFA) 108 (90 Base) MCG/ACT inhaler  Dispense: 18 g; Refill: 3    Anxiety  Continue hydroxyzine as needed well managed at this time.  Stable  -     hydrOXYzine (VISTARIL) 25 MG capsule; Take 1 capsule (25 mg) by mouth at bedtime  -     potassium chloride ER (K-TAB) 20 MEQ CR tablet; Take 3 tablets (60 mEq) by mouth daily    Essential hypertension  Blood pressure with good control today continue amlodipine 10 mg and losartan 100 mg.  Continue 60 mg of potassium a day  Continue walking on treadmill 30 mins every morning  Discussed weight loss     -     amLODIPine (NORVASC) 10 MG tablet; Take 1 tablet (10 mg) by mouth daily  -     losartan (COZAAR) 100 MG tablet; Take 1 tablet (100 mg) by mouth daily  -     CBC with platelets; Future  -     Basic metabolic panel  (Ca, Cl, CO2, Creat, Gluc, K, Na, BUN); Future    Hyperlipidemia LDL goal <100  LDL October 2023 is 109 - improved to < 100   continue atorvastatin 20  -     Lipid panel reflex to direct LDL Non-fasting; Future  -     atorvastatin (LIPITOR) 20 MG tablet; Take 2 tablets (40 mg) by mouth daily    Hypokalemia/high aldosterone renin ratio  Referral submitted endocrinology however is unable to get in until November of this year.  Patient with a history of hypokalemia  requiring 60 mg a day of potassium.  Normal BMP 5/8/24. Aldosterone elevated 2 months ago then normalized to 21.9.  Patient does have paresthesias as well as weakness in her muscles and often feels dehydrated in the morning, also does have very high blood pressure but managed with medications.  Denies palpitations,  "chest pain, shortness of breath.  No history of arrhythmias  -Will check aldosterone and renin (PRA)  -Plasma renin activity- PRA is expressed as the amount of angiotensin I generated per unit of time.   -when screening women receiving estrogen-containing preparations for the presence of primary aldosteronism by aldosterone/renin ratio testing, false-positive ratios can occur when renin is measured as direct renin concentration, but not when it is measured as PRA     -     Basic metabolic panel  (Ca, Cl, CO2, Creat, Gluc, K, Na, BUN); Future    Prediabetes  Patient with prediabetes reviewed previous hemoglobin A1c's as well as diet and exercise changes.  Cut out soda - drinking carbonated water.   A1c was 6.2% - 5/8/24  Has lost 2 pounds  Has been taking metformin since December 2023 -1 tablet daily of Immediate release  Today 5/22/24 - switched to 500mg ER MFM daily.     -     Hemoglobin A1c; Future    Chronic BL foot neuropathic pain  Continue gabapentin 3 times a day doing well with this reviewed MRIs of her spine.  Is possible neuropathic pain is secondary to her   Hyperaldosteronism    Patient to follow-up with me in 3 month    Spent 30mins beyond the preventative visit doing chart review, history and exam, patient education, documentation and further activities for an acute concern per the note.    Assays of the renin-angiotensin-aldosterone system in adrenal disease - UpToDate   Will schedule Wednesday 10:30 lab test                 BMI  Estimated body mass index is 30.25 kg/m  as calculated from the following:    Height as of this encounter: 1.557 m (5' 1.3\").    Weight as of this encounter: 73.3 kg (161 lb 11.2 oz).   Weight management plan: Discussed healthy diet and exercise guidelines          Didi Austin is a 58 year old, presenting for the following health issues:  Follow Up (Follow up and go over test results.)        5/1/2024    11:19 AM   Additional Questions   Roomed by ANDRESSA "   Accompanied by NONE     History of Present Illness       Diabetes:   She presents for follow up of diabetes.    She is not checking blood glucose.        She is concerned about other.   She is having excessive thirst.            Hyperlipidemia:  She presents for follow up of hyperlipidemia.   She is taking medication to lower cholesterol. She is having myalgia or other side effects to statin medications.    Hypertension: She presents for follow up of hypertension.  She does check blood pressure  regularly outside of the clinic. Outpatient blood pressures have not been over 140/90. She does not follow a low salt diet.     Reason for visit:  Prevention And cancer screening for my ovarian cancer and brest cancer    She eats 2-3 servings of fruits and vegetables daily.She consumes 4 sweetened beverage(s) daily.She exercises with enough effort to increase her heart rate 20 to 29 minutes per day.  She exercises with enough effort to increase her heart rate 3 or less days per week.   She is taking medications regularly.     Reviewed previous labs with patient today.  Patient says that her numbness and tingling improves with the use of gabapentin.  Taking metformin daily for prediabetes    She is taking 60 mg daily of potassium -feels her muscle weakness is improved since being on this, no headache    Hyperaldosteroneism   -Reviewed diagnosis with her.  Patient with very dry in the morning.  A lot of times in the morning for stiffness in her limbs and joints    Patient taking hydroxyzine at night to help with sleep.  Feels that this helps her sleep through the night    She is taking atorvastatin as prescribed daily -has been trying to eat more vegetables and fruits more fiber and less meats denies chest pain, palpitations, shortness of breath    Taking amlodipine 10 mg losartan 100 mg.  Pressure today was normal -denies lower extremity edema    Would like to get her mammogram repeated    Would like to get albuterol  "refilled feels that when she is exercising very strenuously she does have wheezing.  Denies wheezing, cough, history of asthma    Pain and burning on heels and bottom of the feet BL - denies Lower back pain               Objective    /80   Pulse 66   Temp 98  F (36.7  C) (Oral)   Resp 18   Ht 1.557 m (5' 1.3\")   Wt 73.3 kg (161 lb 11.2 oz)   SpO2 97%   BMI 30.25 kg/m    Body mass index is 30.25 kg/m .  Physical Exam   GENERAL: alert and no distress  NECK: no adenopathy, no asymmetry, masses, or scars  RESP: lungs clear to auscultation - no rales, rhonchi or wheezes  CV: regular rate and rhythm, normal S1 S2, no S3 or S4, no murmur, click or rub, no peripheral edema  MS: no gross musculoskeletal defects noted, no edema            Signed Electronically by: GILLES Canseco CNP    "

## 2024-05-22 NOTE — PATIENT INSTRUCTIONS
You can call the radiology department at [phone] to schedule your imaging test that was ordered.      Wt Readings from Last 5 Encounters:   05/22/24 73.3 kg (161 lb 11.2 oz)   05/01/24 74.1 kg (163 lb 6.4 oz)   03/27/24 75.3 kg (166 lb)   10/18/23 71.5 kg (157 lb 9.6 oz)   11/26/21 70.3 kg (155 lb)   ]

## 2024-06-06 ENCOUNTER — HOSPITAL ENCOUNTER (OUTPATIENT)
Dept: MAMMOGRAPHY | Facility: CLINIC | Age: 59
Discharge: HOME | End: 2024-06-06
Attending: NURSE PRACTITIONER | Admitting: NURSE PRACTITIONER
Payer: COMMERCIAL

## 2024-06-06 DIAGNOSIS — Z12.31 ENCOUNTER FOR SCREENING MAMMOGRAM FOR BREAST CANCER: ICD-10-CM

## 2024-06-06 PROCEDURE — 77063 BREAST TOMOSYNTHESIS BI: CPT

## 2024-06-24 ENCOUNTER — MYC REFILL (OUTPATIENT)
Dept: FAMILY MEDICINE | Facility: CLINIC | Age: 59
End: 2024-06-24
Payer: COMMERCIAL

## 2024-06-24 DIAGNOSIS — F41.9 ANXIETY: ICD-10-CM

## 2024-06-24 RX ORDER — POTASSIUM CHLORIDE 1500 MG/1
60 TABLET, EXTENDED RELEASE ORAL DAILY
Qty: 270 TABLET | Refills: 1 | OUTPATIENT
Start: 2024-06-24

## 2024-10-23 ENCOUNTER — OFFICE VISIT (OUTPATIENT)
Dept: FAMILY MEDICINE | Facility: CLINIC | Age: 59
End: 2024-10-23
Payer: COMMERCIAL

## 2024-10-23 ENCOUNTER — E-CONSULT (OUTPATIENT)
Dept: ENDOCRINOLOGY | Facility: CLINIC | Age: 59
End: 2024-10-23
Payer: COMMERCIAL

## 2024-10-23 VITALS
WEIGHT: 153.2 LBS | HEART RATE: 89 BPM | DIASTOLIC BLOOD PRESSURE: 60 MMHG | HEIGHT: 61 IN | BODY MASS INDEX: 28.92 KG/M2 | OXYGEN SATURATION: 97 % | TEMPERATURE: 97.8 F | RESPIRATION RATE: 18 BRPM | SYSTOLIC BLOOD PRESSURE: 115 MMHG

## 2024-10-23 DIAGNOSIS — F41.9 ANXIETY: ICD-10-CM

## 2024-10-23 DIAGNOSIS — M79.2 CHRONIC NEUROPATHIC PAIN: ICD-10-CM

## 2024-10-23 DIAGNOSIS — E87.6 HYPOKALEMIA: ICD-10-CM

## 2024-10-23 DIAGNOSIS — I10 ESSENTIAL HYPERTENSION: ICD-10-CM

## 2024-10-23 DIAGNOSIS — R06.2 WHEEZE: ICD-10-CM

## 2024-10-23 DIAGNOSIS — E78.5 HYPERLIPIDEMIA LDL GOAL <100: ICD-10-CM

## 2024-10-23 DIAGNOSIS — E26.9 HIGH ALDOSTERONE (H): ICD-10-CM

## 2024-10-23 DIAGNOSIS — G62.9 NEUROPATHY: Primary | ICD-10-CM

## 2024-10-23 DIAGNOSIS — G89.29 CHRONIC NEUROPATHIC PAIN: ICD-10-CM

## 2024-10-23 DIAGNOSIS — R73.03 PREDIABETES: ICD-10-CM

## 2024-10-23 PROCEDURE — 90750 HZV VACC RECOMBINANT IM: CPT | Performed by: NURSE PRACTITIONER

## 2024-10-23 PROCEDURE — 99214 OFFICE O/P EST MOD 30 MIN: CPT | Mod: 25 | Performed by: NURSE PRACTITIONER

## 2024-10-23 PROCEDURE — 99207 PR NO BILLABLE SERVICE THIS VISIT: CPT

## 2024-10-23 PROCEDURE — 90471 IMMUNIZATION ADMIN: CPT | Performed by: NURSE PRACTITIONER

## 2024-10-23 PROCEDURE — 91320 SARSCV2 VAC 30MCG TRS-SUC IM: CPT | Performed by: NURSE PRACTITIONER

## 2024-10-23 PROCEDURE — 90472 IMMUNIZATION ADMIN EACH ADD: CPT | Performed by: NURSE PRACTITIONER

## 2024-10-23 PROCEDURE — 90673 RIV3 VACCINE NO PRESERV IM: CPT | Performed by: NURSE PRACTITIONER

## 2024-10-23 PROCEDURE — 90480 ADMN SARSCOV2 VAC 1/ONLY CMP: CPT | Performed by: NURSE PRACTITIONER

## 2024-10-23 PROCEDURE — 99207 E-CONSULT TO ENDOCRINOLOGY (ADULT OUTPT PROVIDER TO SPECIALIST WRITTEN QUESTION & RESPONSE): CPT | Performed by: NURSE PRACTITIONER

## 2024-10-23 RX ORDER — GABAPENTIN 250 MG/5ML
300 SOLUTION ORAL 3 TIMES DAILY
Qty: 270 ML | Refills: 0 | Status: SHIPPED | OUTPATIENT
Start: 2024-10-23 | End: 2024-11-06

## 2024-10-23 RX ORDER — METFORMIN HYDROCHLORIDE 500 MG/1
500 TABLET, EXTENDED RELEASE ORAL
Qty: 90 TABLET | Refills: 3 | Status: SHIPPED | OUTPATIENT
Start: 2024-10-23

## 2024-10-23 RX ORDER — ALBUTEROL SULFATE 90 UG/1
2 INHALANT RESPIRATORY (INHALATION) EVERY 6 HOURS PRN
Qty: 18 G | Refills: 3 | Status: SHIPPED | OUTPATIENT
Start: 2024-10-23

## 2024-10-23 RX ORDER — LOSARTAN POTASSIUM 100 MG/1
100 TABLET ORAL DAILY
Qty: 90 TABLET | Refills: 3 | Status: SHIPPED | OUTPATIENT
Start: 2024-10-23 | End: 2024-11-06

## 2024-10-23 RX ORDER — POTASSIUM CHLORIDE 1500 MG/1
60 TABLET, EXTENDED RELEASE ORAL DAILY
Qty: 270 TABLET | Refills: 1 | Status: SHIPPED | OUTPATIENT
Start: 2024-10-23 | End: 2024-11-06

## 2024-10-23 RX ORDER — GABAPENTIN 300 MG/1
300 CAPSULE ORAL 3 TIMES DAILY
COMMUNITY
Start: 2024-10-20 | End: 2024-11-06

## 2024-10-23 RX ORDER — SPIRONOLACTONE 100 MG/1
100 TABLET, FILM COATED ORAL DAILY
Qty: 30 TABLET | Refills: 1 | Status: SHIPPED | OUTPATIENT
Start: 2024-10-23 | End: 2024-11-06

## 2024-10-23 ASSESSMENT — PAIN SCALES - GENERAL: PAINLEVEL_OUTOF10: EXTREME PAIN (8)

## 2024-10-23 NOTE — PROGRESS NOTES
Hypokalemia/high aldosterone renin ratio  Essential hypertension  Referral submitted endocrinology however is unable to get in until November of this year.  Patient with a history of hypokalemia requiring 60 mg a day of potassium.  Normal BMP 5/8/24, rechecking today. Aldosterone renin ratio was 55.3 3/2024,  Patient does have paresthesias as well as weakness in her muscles, muscle spasms, polydipsia, fatigue, and sometimes trouble breathing that does improve with albuterol.  HTN managed with medications (Losartan and Amlodipine - switching amlodipine to spironolactone)    Denies palpitations, chest pain, shortness of breath.  No history of arrhythmias  Pt is on no Estrogen replacements    HTN: Spironolactone 100mg and Losartan 100mg (Stop Amlodipine 10mg)  -Will check aldosterone and renin ratio again  -Mag  -BMP  -continue potassium repletion 60  -pt to follow up with me in 2 weeks for BP recheck and BMP  -Renal CT w/wo contrast ordered today      -Econsult done with Endocrine to see if any other tests needed before endocrine appointment     -     potassium chloride ER (K-TAB) 20 MEQ CR tablet; Take 3 tablets (60 mEq) by mouth daily    Wheeze  Refilling albuterol  -uses when feeling dry mouth - discussed what its used to today - use for wheezing - not taking often  - albuterol (PROAIR HFA/PROVENTIL HFA/VENTOLIN HFA) 108 (90 Base) MCG/ACT inhaler  Dispense: 18 g; Refill: 3    Anxiety  Continue hydroxyzine as needed well managed at this time.  Stable  -     hydrOXYzine (VISTARIL) 25 MG capsule; Take 1 capsule (25 mg) by mouth at bedtime    Hyperlipidemia LDL goal <100  LDL October 2023 is 109 - improved to < 100   continue atorvastatin 20  -     Lipid panel reflex to direct LDL Non-fasting; Future  -     atorvastatin (LIPITOR) 20 MG tablet; Take 2 tablets (40 mg) by mouth daily    Prediabetes  Patient with prediabetes reviewed previous hemoglobin A1c's as well as diet and exercise changes.  Cut out soda - drinking  "carbonated water.   A1c was 6.2% - 5/8/24  Has lost 2 pounds  Has been taking metformin since December 2023 -1 tablet daily of Immediate release  5/22/24 - switched to 500mg ER MFM daily.   Today 10/23/24 A1c is 6.1% - continue 500mg ER MFM daily   Discussed with patient today that she likely will not qualify for diabetic inserts given that she does not have follow-up on type 2 diabetes  -     Hemoglobin A1c; Future    Chronic BL foot neuropathic pain/neuropathy   Continue gabapentin 3 times a day doing well with this reviewed MRIs of her spine.  Is possible neuropathic pain is secondary to her Hyperaldosteronism    Follow up with endocrine.     Patient to follow-up with me in 3 month    Spent 30mins beyond the preventative visit doing chart review, history and exam, patient education, documentation and further activities for an acute concern per the note.    Assays of the renin-angiotensin-aldosterone system in adrenal disease - UpToDate   Will schedule Wednesday 10:30 lab test                 BMI  Estimated body mass index is 28.67 kg/m  as calculated from the following:    Height as of this encounter: 1.557 m (5' 1.3\").    Weight as of this encounter: 69.5 kg (153 lb 3.2 oz).   Weight management plan: Discussed healthy diet and exercise guidelines          Didi Austin is a 58 year old, presenting for the following health issues:  RECHECK (Foot pain . Asking for insole for shoes.)        5/1/2024    11:19 AM   Additional Questions   Roomed by SAAD-LPN   Accompanied by NONE     History of Present Illness       Diabetes:   She presents for follow up of diabetes.    She is not checking blood glucose.        She is concerned about other.   She is having excessive thirst.            Hyperlipidemia:  She presents for follow up of hyperlipidemia.   She is taking medication to lower cholesterol. She is having myalgia or other side effects to statin medications.    Hypertension: She presents for follow up of " hypertension.  She does check blood pressure  regularly outside of the clinic. Outpatient blood pressures have not been over 140/90. She does not follow a low salt diet.     Reason for visit:  Prevention And cancer screening for my ovarian cancer and brest cancer    She eats 2-3 servings of fruits and vegetables daily.She consumes 4 sweetened beverage(s) daily.She exercises with enough effort to increase her heart rate 20 to 29 minutes per day.  She exercises with enough effort to increase her heart rate 3 or less days per week.   She is taking medications regularly.     She is taking 60 mg daily of potassium -feels her muscle weakness is improved since being on this, no headache, denies CP, palpitations,     Hyperaldosteronism   - Patient with very dry in the morning.  A lot of times in the morning for stiffness in her limbs and joints, denies Headaches, dizziness, has fatigues when she missed her medications, sometimes has shortness of breath, worse when breathing more, has dry mouth.     Patient taking hydroxyzine at night to help with sleep.  Feels that this helps her sleep through the night    Standing 10-12 hours/day - taking breaks. Will nap in the break room sometimes.   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    Patient says that her numbness and tingling improves with the use of gabapentin.  Taking metformin daily for prediabetes - needing this three times/day    She is taking atorvastatin as prescribed daily -has been trying to eat more vegetables and fruits more fiber and less meats denies chest pain, palpitations, shortness of breath    Taking amlodipine 10 mg losartan 100 mg.  Pressure today was normal -denies lower extremity edema    Would like to get albuterol refilled feels that when she is exercising very strenuously she does have wheezing.  Denies wheezing, cough, history of asthma    Pain and burning on heels and bottom of the feet BL - denies Lower back  "pain               Objective    /60 (BP Location: Right arm, Patient Position: Sitting, Cuff Size: Adult Regular)   Pulse 89   Temp 97.8  F (36.6  C) (Oral)   Resp 18   Ht 1.557 m (5' 1.3\")   Wt 69.5 kg (153 lb 3.2 oz)   SpO2 97%   BMI 28.67 kg/m    Body mass index is 28.67 kg/m .  Physical Exam   GENERAL: alert and no distress  NECK: no adenopathy, no asymmetry, masses, or scars  RESP: lungs clear to auscultation - no rales, rhonchi or wheezes  CV: regular rate and rhythm, normal S1 S2, no S3 or S4, no murmur, click or rub, no peripheral edema  MS: no gross musculoskeletal defects noted, no edema  Sensory exam of the foot is abnormal, tested with the monofilament. Pt has decreased sensation bilaterally, Good pulses, no lesions or ulcers, good peripheral pulses. Wearing closed toes shoes              Signed Electronically by: GILLES Canesco CNP    "

## 2024-10-23 NOTE — PROGRESS NOTES
10/23/2024     E-Consult has been denied due to: Doesn't meet criteria for E-Consult - Patient previously established care with specialty, or scheduled within the next 3 months.    Interprofessional consultation requested by:  Sherry Jenkins APRN CNP      Clinical Question/Purpose: MY CLINICAL QUESTION IS: patient with hyperaldosteronism, undiagnosed for some time, symptoms of low potassium (repleating with 60/day), dry mouth, shortness of breath, fatigue, neuropathy, calf muscle spasms and hands, polydipsia, polyuria.  Has an appointment in Nov, I'm ordering repeat labs today, any other recommendations I can get her started with before she sees you to expedite care?    Patient assessment and information reviewed:    Latest Reference Range & Units 03/20/24 08:07 05/08/24 10:24   Aldosterone 0.0 - 31.0 ng/dL 44.2 (H) 21.9   (H): Data is abnormally high   Latest Reference Range & Units 03/20/24 08:07 05/08/24 10:24   Renin Activity ng/mL/hr 0.8 0.2     Recommendations:   I am attaching general e-consult information for your reference.     What is an e-consult  Physician to physician consultation to address a specific question.    It is billed to the patient  Both the referring and receiving MD get [facility].    You must use the system wide e-consult referral and template to place an e-consult     Requirements for an e-consult  A specific question must be asked   Sending provider approves with the patient that an e-consult is being sent to address a specific question.      Reasons for denial of e-consult  A specific question was not asked  The E-consult is actually a scheduling /triage request  The problem is too complex for e-consult   The patient was seen in endocrinology within the last 3 years or has appt already scheduled within 3 months.      If your e-consult is denied you may resend a new e-consult meeting criteria as outlined above.   Alternatively, you could refer the patient back to their primary  care provider to primarily address the problem or determine whether or not specialty consult is needed.     An e-consult is not the following:  An ongoing conversation between the sending and e-consult physicians . If such a conversation is needed a full consult would be more appropriate.   Placement of orders for the tests recommended by the e-consultant    Follow-up interpretation and secondary advice in response to an an initial e-consult. IF such an action is needed, a full consult would be more appropriate      The recommendations provided in this E-Consult are based on a review of clinical data pertinent to the clinical question presented, without a review of the patient's complete medical record or, the benefit of a comprehensive in-person or virtual patient evaluation. This consultation should not replace the clinical judgement and evaluation of the provider ordering this E-Consult. Any new clinical issues, or changes in patient status since the filing of this E-Consult will need to be taken into account when assessing these recommendations. Please contact me if you have further questions.    My total time spent reviewing clinical information and formulating assessment was 5 minutes.        Casie Jaffe MD

## 2024-10-23 NOTE — PATIENT INSTRUCTIONS
Today stop your amlodipine  Tomorrow start spirolactone 100mg daily instead tomorrow  Check you blood pressure in the evening  Goal is <140/90    We will get labs today   I ordered a CT scan of your kidneys    I refilled your gabapentin  I refilled your potassium.     I will have you follow up with me in 2 weeks. 740am Wednesday Nov 6th.

## 2024-10-30 ENCOUNTER — HOSPITAL ENCOUNTER (OUTPATIENT)
Dept: CT IMAGING | Facility: CLINIC | Age: 59
Discharge: HOME | End: 2024-10-30
Attending: NURSE PRACTITIONER | Admitting: NURSE PRACTITIONER
Payer: COMMERCIAL

## 2024-10-30 DIAGNOSIS — E26.9 HIGH ALDOSTERONE (H): ICD-10-CM

## 2024-10-30 PROCEDURE — 74178 CT ABD&PLV WO CNTR FLWD CNTR: CPT

## 2024-10-30 PROCEDURE — 250N000011 HC RX IP 250 OP 636: Performed by: NURSE PRACTITIONER

## 2024-10-30 RX ORDER — IOPAMIDOL 755 MG/ML
90 INJECTION, SOLUTION INTRAVASCULAR ONCE
Status: COMPLETED | OUTPATIENT
Start: 2024-10-30 | End: 2024-10-30

## 2024-10-30 RX ADMIN — IOPAMIDOL 90 ML: 755 INJECTION, SOLUTION INTRAVENOUS at 17:05

## 2024-11-01 SDOH — HEALTH STABILITY: PHYSICAL HEALTH: ON AVERAGE, HOW MANY DAYS PER WEEK DO YOU ENGAGE IN MODERATE TO STRENUOUS EXERCISE (LIKE A BRISK WALK)?: 3 DAYS

## 2024-11-01 SDOH — HEALTH STABILITY: PHYSICAL HEALTH: ON AVERAGE, HOW MANY MINUTES DO YOU ENGAGE IN EXERCISE AT THIS LEVEL?: 30 MIN

## 2024-11-01 ASSESSMENT — SOCIAL DETERMINANTS OF HEALTH (SDOH): HOW OFTEN DO YOU GET TOGETHER WITH FRIENDS OR RELATIVES?: ONCE A WEEK

## 2024-11-01 NOTE — RESULT ENCOUNTER NOTE
Pt new diagnosis with adrenal adenoma with hyperaldosteronism.    IMPRESSION:  1.  Bosniak 1 and 2 bilateral renal cysts.  2.  2.1 cm right adrenal adenoma.  3.  Mosaic attenuation in the lung bases likely due to small airway disease.

## 2024-11-06 ENCOUNTER — OFFICE VISIT (OUTPATIENT)
Dept: FAMILY MEDICINE | Facility: CLINIC | Age: 59
End: 2024-11-06
Payer: COMMERCIAL

## 2024-11-06 VITALS
OXYGEN SATURATION: 99 % | HEIGHT: 61 IN | BODY MASS INDEX: 29.06 KG/M2 | WEIGHT: 153.9 LBS | HEART RATE: 99 BPM | DIASTOLIC BLOOD PRESSURE: 78 MMHG | SYSTOLIC BLOOD PRESSURE: 116 MMHG | TEMPERATURE: 97.9 F | RESPIRATION RATE: 14 BRPM

## 2024-11-06 DIAGNOSIS — G62.9 NEUROPATHY: ICD-10-CM

## 2024-11-06 DIAGNOSIS — D35.01 ADRENAL ADENOMA, RIGHT: ICD-10-CM

## 2024-11-06 DIAGNOSIS — N28.1 RENAL CYST: ICD-10-CM

## 2024-11-06 DIAGNOSIS — E26.9 HIGH ALDOSTERONE (H): ICD-10-CM

## 2024-11-06 DIAGNOSIS — Z80.41 FAMILY HISTORY OF OVARIAN CANCER: ICD-10-CM

## 2024-11-06 DIAGNOSIS — I10 ESSENTIAL HYPERTENSION: ICD-10-CM

## 2024-11-06 DIAGNOSIS — E26.9 HYPERALDOSTERONISM (H): ICD-10-CM

## 2024-11-06 DIAGNOSIS — Z00.00 ROUTINE GENERAL MEDICAL EXAMINATION AT A HEALTH CARE FACILITY: Primary | ICD-10-CM

## 2024-11-06 DIAGNOSIS — R06.2 WHEEZE: ICD-10-CM

## 2024-11-06 LAB
ANION GAP SERPL CALCULATED.3IONS-SCNC: 11 MMOL/L (ref 7–15)
BUN SERPL-MCNC: 38.3 MG/DL (ref 6–20)
CALCIUM SERPL-MCNC: 9.7 MG/DL (ref 8.8–10.4)
CHLORIDE SERPL-SCNC: 104 MMOL/L (ref 98–107)
CREAT SERPL-MCNC: 0.99 MG/DL (ref 0.51–0.95)
EGFRCR SERPLBLD CKD-EPI 2021: 66 ML/MIN/1.73M2
ESTRADIOL SERPL-MCNC: 13 PG/ML
GLUCOSE SERPL-MCNC: 107 MG/DL (ref 70–99)
HCO3 SERPL-SCNC: 22 MMOL/L (ref 22–29)
POTASSIUM SERPL-SCNC: 5.3 MMOL/L (ref 3.4–5.3)
SODIUM SERPL-SCNC: 137 MMOL/L (ref 135–145)

## 2024-11-06 PROCEDURE — 80048 BASIC METABOLIC PNL TOTAL CA: CPT | Performed by: NURSE PRACTITIONER

## 2024-11-06 PROCEDURE — 99213 OFFICE O/P EST LOW 20 MIN: CPT | Mod: 25 | Performed by: NURSE PRACTITIONER

## 2024-11-06 PROCEDURE — 99396 PREV VISIT EST AGE 40-64: CPT | Performed by: NURSE PRACTITIONER

## 2024-11-06 PROCEDURE — 82670 ASSAY OF TOTAL ESTRADIOL: CPT | Performed by: NURSE PRACTITIONER

## 2024-11-06 PROCEDURE — 36415 COLL VENOUS BLD VENIPUNCTURE: CPT | Performed by: NURSE PRACTITIONER

## 2024-11-06 RX ORDER — SPIRONOLACTONE 100 MG/1
200 TABLET, FILM COATED ORAL DAILY
Qty: 60 TABLET | Refills: 0 | Status: SHIPPED | OUTPATIENT
Start: 2024-11-06

## 2024-11-06 RX ORDER — GABAPENTIN 300 MG/1
300 CAPSULE ORAL 3 TIMES DAILY
Qty: 270 CAPSULE | Refills: 1 | Status: SHIPPED | OUTPATIENT
Start: 2024-11-06

## 2024-11-06 RX ORDER — LOSARTAN POTASSIUM 50 MG/1
50 TABLET ORAL DAILY
COMMUNITY
Start: 2024-11-06

## 2024-11-06 NOTE — PROGRESS NOTES
Preventive Care Visit  [facility]  Sherry GILLES Jenkins CNP, Family Medicine  Nov 6, 2024      Assessment & Plan     Routine general medical examination at a health care facility  We discussed healthy lifestyle, nutrition, cardiovascular risk reduction, self care, safety, sunscreen, and timing of cancer screening.  Health maintenance screening and immunizations reviewed with the patient.  Follow up yearly for the annual physical.    Colonoscopy 9/16/2020  Mammo done 6/2024  US of ovaryies every 3-5 years -done 4/29/21 pt preferred given mom with hx of ovarian CA -   10/3/20 pap/HPV [facility]    Renal cyst  Discussed finding on CT scan - based on size - no need for follow up of these at this time  Dicussed that this is common and benign    Family history of ovarian cancer  Will check estrogen today to be sure not an estrogen secreting adenoma    Neuropathy  Secondary to hyperaldosteronism  Switching liquid to caps of gabapentin  continue TID   - gabapentin (NEURONTIN) 300 MG capsule  Dispense: 270 capsule; Refill: 1    Hypokalemia/Hyperaldosteronism  Adrenal adenoma of the right   Essential hypertension  Endocrine establish care on 11/20/24  Tolerating spironolactone 100mg well   Patient with a history of hypokalemia requiring 60 mg a day of potassium.  Normal BMP with losartan and Spironolactone 100mg (consider increasing this to 200mg). Aldosterone renin ratio was 55.3 3/2024, CT shows 10/30/24 shows 2.1 x 1.2 cm right adrenal adenoma.     Patient does have paresthesias as well as weakness in her muscles, muscle spasms, polydipsia, fatigue, and sometimes trouble breathing that does improve with albuterol.  HTN managed with medications (Losartan and Amlodipine - switching amlodipine to spironolactone)   Denies palpitations, chest pain, shortness of breath.  No history of arrhythmias  Pt is on no Estrogen replacements     HTN: Spironolactone 100mg and Losartan 100mg  (after stopping Amlodipine 10mg)  -recheck BMP  -continue potassium repletion 60     -     potassium chloride ER (K-TAB) 20 MEQ CR tablet; Take 3 tablets (60 mEq) by mouth daily     Wheeze  Refilling albuterol  -uses when feeling dry mouth - discussed what its used to today - use for wheezing - not taking often  - albuterol (PROAIR HFA/PROVENTIL HFA/VENTOLIN HFA) 108 (90 Base) MCG/ACT inhaler  Dispense: 18 g; Refill: 3     Anxiety  Continue hydroxyzine as needed well managed at this time.  Stable  -     hydrOXYzine (VISTARIL) 25 MG capsule; Take 1 capsule (25 mg) by mouth at bedtime     Hyperlipidemia LDL goal <100  LDL October 2023 is 109 - improved to < 100   continue atorvastatin 20  -     Lipid panel reflex to direct LDL Non-fasting; Future  -     atorvastatin (LIPITOR) 20 MG tablet; Take 2 tablets (40 mg) by mouth daily     Prediabetes  Patient with prediabetes reviewed previous hemoglobin A1c's as well as diet and exercise changes.  Cut out soda - drinking carbonated water.   A1c was 6.1%% (6.2%) - 5/8/24  Has lost 2 pounds  Has been taking metformin since December 2023 -1 tablet daily of Immediate release  5/22/24 - switched to 500mg ER MFM daily.   continue 500mg ER MFM daily      Chronic BL foot neuropathic pain/neuropathy   Continue gabapentin 3 times a day doing well with this reviewed MRIs of her spine.  Is possible neuropathic pain is secondary to her Hyperaldosteronism     Follow up with endocrine.        Patient has been advised of split billing requirements and indicates understanding: Yes        Counseling  Appropriate preventive services were addressed with this patient via screening, questionnaire, or discussion as appropriate for fall prevention, nutrition, physical activity, Tobacco-use cessation, social engagement, weight loss and cognition.  Checklist reviewing preventive services available has been given to the patient.  Reviewed patient's diet, addressing concerns and/or questions.   She is  at risk for lack of exercise and has been provided with information to increase physical activity for the benefit of her well-being.           Didi Austin is a 59 year old, presenting for the following:  Physical (Labs and imaging review. Blood pressure and BMP check.)        11/6/2024     7:51 AM   Additional Questions   Roomed by RENEE CARTY    HTN: denies CP, palpitations,   Taking nakia today 100mg and potassium today 60 (3x20mg) this morning.       She is taking 60 mg daily of potassium -feels her muscle weakness is improved since being on this, no headache, denies CP, palpitations,      Pt remembers very high uncontrolled blood pressures starting at age 46,   Polydipsia continues, neuropathy of the feet improved with gabapentin, and cramps/spasms of hands and feet, have also improved.   Has been taking gabapentin 300mg TID but would like to go back to capsules and not liquid    Hyperaldosteronism   - Patient with very dry in the morning.  A lot of times in the morning for stiffness in her limbs and joints, denies Headaches, dizziness, has fatigues when she missed her medications, sometimes has shortness of breath, worse when breathing more, has dry mouth.      Patient taking hydroxyzine at night to help with sleep.  Feels that this helps her sleep through the night     Standing 10-12 hours/day - taking breaks. Will nap in the break room sometimes.   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    Patient says that her numbness and tingling improves with the use of gabapentin.  Taking metformin daily for prediabetes - needing this three times/day     She is taking atorvastatin as prescribed daily -has been trying to eat more vegetables and fruits more fiber and less meats denies chest pain, palpitations, shortness of breath     Would like to get albuterol refilled feels that when she is exercising very strenuously she does have wheezing.  Denies wheezing,  cough, history of asthma     Pain and burning on heels and bottom of the feet BL - denies Lower back pain              Health Care Directive  Patient does not have a Health Care Directive: Discussed advance care planning with patient; information given to patient to review.      11/1/2024   General Health   How would you rate your overall physical health? Excellent   Feel stress (tense, anxious, or unable to sleep) Not at all            11/1/2024   Nutrition   Three or more servings of calcium each day? Yes   Diet: Low salt    Carbohydrate counting   How many servings of fruit and vegetables per day? (!) 2-3   How many sweetened beverages each day? (!) 2       Multiple values from one day are sorted in reverse-chronological order         11/1/2024   Exercise   Days per week of moderate/strenous exercise 3 days   Average minutes spent exercising at this level 30 min            11/1/2024   Social Factors   Frequency of gathering with friends or relatives Once a week   Worry food won't last until get money to buy more No   Food not last or not have enough money for food? No   Do you have housing? (Housing is defined as stable permanent housing and does not include staying ouside in a car, in a tent, in an abandoned building, in an overnight shelter, or couch-surfing.) Yes   Are you worried about losing your housing? No   Lack of transportation? No   Unable to get utilities (heat,electricity)? No            11/1/2024   Fall Risk   Fallen 2 or more times in the past year? No    Trouble with walking or balance? No        Patient-reported          11/1/2024   Dental   Dentist two times every year? Yes                 Today's PHQ-2 Score:       3/27/2024     3:06 PM   PHQ-2 ( 1999 [employer])   Q1: Little interest or pleasure in doing things 2    Q2: Feeling down, depressed or hopeless 0    PHQ-2 Score 2   Q1: Little interest or pleasure in doing things More than half the days   Q2: Feeling down, depressed or hopeless Not at  "all   PHQ-2 Score 2       Patient-reported         11/1/2024   Substance Use   Alcohol more than 3/day or more than 7/wk No   Do you use any other substances recreationally? No        Social History     Tobacco Use    Smoking status: Never     Passive exposure: Never    Smokeless tobacco: Never   Vaping Use    Vaping status: Never Used   Substance Use Topics    Alcohol use: Never    Drug use: Never           6/6/2024   LAST FHS-7 RESULTS   1st degree relative breast or ovarian cancer Yes   Any relative bilateral breast cancer No   Any male have breast cancer No   Any ONE woman have BOTH breast AND ovarian cancer No   Any woman with breast cancer before 50yrs No   2 or more relatives with breast AND/OR ovarian cancer Yes   2 or more relatives with breast AND/OR bowel cancer No           Mammogram Screening - Mammogram every 1-2 years updated in Health Maintenance based on mutual decision making        11/1/2024   STI Screening   New sexual partner(s) since last STI/HIV test? No        History of abnormal Pap smear: No - age 30- 64 PAP with HPV every 5 years recommended        10/3/2020     8:30 AM   PAP / HPV   PAP-ABSTRACT See Scanned Document           This result is from an external source.     ASCVD Risk   The 10-year ASCVD risk score (Darci JIMENEZ, et al., 2019) is: 3%    Values used to calculate the score:      Age: 59 years      Sex: Female      Is Non- : No      Diabetic: No      Tobacco smoker: No      Systolic Blood Pressure: 116 mmHg      Is BP treated: Yes      HDL Cholesterol: 48 mg/dL      Total Cholesterol: 178 mg/dL           Reviewed and updated as needed this visit by Provider   Tobacco  Allergies  Meds  Problems  Med Hx  Surg Hx  Fam Hx                     Objective    Exam  /78 (BP Location: Right arm, Patient Position: Sitting, Cuff Size: Adult Regular)   Pulse 99   Temp 97.9  F (36.6  C) (Oral)   Resp 14   Ht 1.549 m (5' 1\")   Wt 69.8 kg (153 lb " "14.4 oz)   SpO2 99%   BMI 29.08 kg/m     Estimated body mass index is 29.08 kg/m  as calculated from the following:    Height as of this encounter: 1.549 m (5' 1\").    Weight as of this encounter: 69.8 kg (153 lb 14.4 oz).      Narrative & Impression   EXAM: CT RENAL MASS WO and W CONTRAST  LOCATION: [facility]  DATE: 10/30/2024     INDICATION:  High aldosterone (H)  COMPARISON: None.  TECHNIQUE: CT scan of the abdomen using renal mass protocol with pre contrast, arterial, portal venous, and delayed images. The pelvis was imaged during the portal venous phase. Multiplanar reformats were obtained. Dose reduction techniques were used.  CONTRAST: Isovue 370 90mL     FINDINGS:     LOWER CHEST: Mosaic attenuation of the lung bases.     HEPATOBILIARY: Normal.     PANCREAS: Normal.     SPLEEN: Normal.     ADRENAL GLANDS: 2.1 x 1.2 cm right adrenal adenoma. Normal left adrenal gland.     RIGHT KIDNEY: Simple cysts largest measuring 3.2 cm in the lower pole, no follow-up needed. Other subcentimeter low-attenuation renal lesions are likely simple or proteinaceous cysts. No stone, enhancing renal mass, hydronephrosis or filling defect.     LEFT KIDNEY: Bosniak 1 and 2 renal cysts measuring up to 1.4 cm, no follow-up needed. No stone, enhancing renal mass, hydronephrosis or filling defect.     BOWEL: Normal.     LYMPH NODES: Normal.     VASCULATURE: Normal.     MUSCULOSKELETAL: Normal.                                                                      IMPRESSION:  1.  Bosniak 1 and 2 bilateral renal cysts.  2.  2.1 cm right adrenal adenoma.  3.  Mosaic attenuation in the lung bases likely due to small airway disease.         Physical Exam  GENERAL: alert and no distress  EYES: Eyes grossly normal to inspection, PERRL and conjunctivae and sclerae normal  HENT: ear canals and TM's normal, nose and mouth without ulcers or lesions  NECK: no adenopathy, no asymmetry, masses, or scars  RESP: lungs clear " to auscultation - no rales, rhonchi or wheezes  CV: regular rate and rhythm, normal S1 S2, no S3 or S4, no murmur, click or rub, no peripheral edema  ABDOMEN: soft, nontender, no hepatosplenomegaly, no masses and bowel sounds normal  MS: no gross musculoskeletal defects noted, no edema  SKIN: no suspicious lesions or rashes  NEURO: Normal strength and tone, mentation intact and speech normal  PSYCH: mentation appears normal, affect normal/bright        Signed Electronically by: GILLES Canseco CNP

## 2024-11-06 NOTE — PATIENT INSTRUCTIONS
Patient Education   Preventive Care Advice   This is general advice given by our system to help you stay healthy. However, your care team may have specific advice just for you. Please talk to your care team about your preventive care needs.  Nutrition  Eat 5 or more servings of fruits and vegetables each day.  Try wheat bread, brown rice and whole grain pasta (instead of white bread, rice, and pasta).  Get enough calcium and vitamin D. Check the label on foods and aim for 100% of the RDA (recommended daily allowance).  Lifestyle  Exercise at least 150 minutes each week  (30 minutes a day, 5 days a week).  Do muscle strengthening activities 2 days a week. These help control your weight and prevent disease.  No smoking.  Wear sunscreen to prevent skin cancer.  Have a dental exam and cleaning every 6 months.  Yearly exams  See your health care team every year to talk about:  Any changes in your health.  Any medicines your care team has prescribed.  Preventive care, family planning, and ways to prevent chronic diseases.  Shots (vaccines)   HPV shots (up to age 26), if you've never had them before.  Hepatitis B shots (up to age 59), if you've never had them before.  COVID-19 shot: Get this shot when it's due.  Flu shot: Get a flu shot every year.  Tetanus shot: Get a tetanus shot every 10 years.  Pneumococcal, hepatitis A, and RSV shots: Ask your care team if you need these based on your risk.  Shingles shot (for age 50 and up)  General health tests  Diabetes screening:  Starting at age 35, Get screened for diabetes at least every 3 years.  If you are younger than age 35, ask your care team if you should be screened for diabetes.  Cholesterol test: At age 39, start having a cholesterol test every 5 years, or more often if advised.  Bone density scan (DEXA): At age 50, ask your care team if you should have this scan for osteoporosis (brittle bones).  Hepatitis C: Get tested at least once in your life.  STIs (sexually  transmitted infections)  Before age 24: Ask your care team if you should be screened for STIs.  After age 24: Get screened for STIs if you're at risk. You are at risk for STIs (including HIV) if:  You are sexually active with more than one person.  You don't use condoms every time.  You or a partner was diagnosed with a sexually transmitted infection.  If you are at risk for HIV, ask about PrEP medicine to prevent HIV.  Get tested for HIV at least once in your life, whether you are at risk for HIV or not.  Cancer screening tests  Cervical cancer screening: If you have a cervix, begin getting regular cervical cancer screening tests starting at age 21.  Breast cancer scan (mammogram): If you've ever had breasts, begin having regular mammograms starting at age 40. This is a scan to check for breast cancer.  Colon cancer screening: It is important to start screening for colon cancer at age 45.  Have a colonoscopy test every 10 years (or more often if you're at risk) Or, ask your provider about stool tests like a FIT test every year or Cologuard test every 3 years.  To learn more about your testing options, visit:   .  For help making a decision, visit:   https://bit.ly/du65418.  Prostate cancer screening test: If you have a prostate, ask your care team if a prostate cancer screening test (PSA) at age 55 is right for you.  Lung cancer screening: If you are a current or former smoker ages 50 to 80, ask your care team if ongoing lung cancer screenings are right for you.  For informational purposes only. Not to replace the advice of your health care provider. Copyright   2023 [employer]. All rights reserved. Clinically reviewed by the [facility] Transitions Program. [employer] 512311 - REV 01/24.

## 2024-11-07 ENCOUNTER — TELEPHONE (OUTPATIENT)
Dept: FAMILY MEDICINE | Facility: CLINIC | Age: 59
End: 2024-11-07
Payer: COMMERCIAL

## 2024-11-07 ENCOUNTER — MYC MEDICAL ADVICE (OUTPATIENT)
Dept: FAMILY MEDICINE | Facility: CLINIC | Age: 59
End: 2024-11-07
Payer: COMMERCIAL

## 2024-11-07 NOTE — RESULT ENCOUNTER NOTE
Please call patient and notify her about the changes to her medications and also schedule nurse only BP recheck x1 in 1 week    Geovanna, please stop your potassium, and increase your spironolactone to 200mg/day.  Please also decrease your losartan to 50mg daily (1/2 tablet).   I would like you to come in for a lab/RN BP check in 1 week.     Thank you  GILLES Canseco CNP on 11/6/2024 at 6:26 PM

## 2024-11-07 NOTE — TELEPHONE ENCOUNTER
Outreach #1. Call to patient with no answer. TCB.     ----- Message from Sherry Jenkins sent at 11/6/2024  6:26 PM CST -----  Please call patient and notify her about the changes to her medications and also schedule nurse only BP recheck x1 in 1 week    Geovanna, please stop your potassium, and increase your spironolactone to 200mg/day.  Please also decrease your losartan to 50mg daily (1/2 tablet).   I would like you to come in for a lab/RN BP check in 1 week.     Thank you  GILLES Canseco CNP on 11/6/2024 at 6:26 PM

## 2024-11-11 NOTE — TELEPHONE ENCOUNTER
"Replied to patient.   Clarified to patient, 50 mg daily.   \"1/2 tab losartan potassium 100 mg a day\"   "

## 2024-11-20 ENCOUNTER — LAB (OUTPATIENT)
Dept: LAB | Facility: CLINIC | Age: 59
End: 2024-11-20
Payer: COMMERCIAL

## 2024-11-20 DIAGNOSIS — E26.9 HIGH ALDOSTERONE (H): ICD-10-CM

## 2024-11-20 LAB
ANION GAP SERPL CALCULATED.3IONS-SCNC: 9 MMOL/L (ref 7–15)
BUN SERPL-MCNC: 39.6 MG/DL (ref 6–20)
CALCIUM SERPL-MCNC: 9.3 MG/DL (ref 8.8–10.4)
CHLORIDE SERPL-SCNC: 107 MMOL/L (ref 98–107)
CREAT SERPL-MCNC: 1.2 MG/DL (ref 0.51–0.95)
EGFRCR SERPLBLD CKD-EPI 2021: 52 ML/MIN/1.73M2
GLUCOSE SERPL-MCNC: 102 MG/DL (ref 70–99)
HCO3 SERPL-SCNC: 22 MMOL/L (ref 22–29)
POTASSIUM SERPL-SCNC: 5 MMOL/L (ref 3.4–5.3)
SODIUM SERPL-SCNC: 138 MMOL/L (ref 135–145)

## 2024-11-20 PROCEDURE — 82627 DEHYDROEPIANDROSTERONE: CPT

## 2024-11-20 PROCEDURE — 82088 ASSAY OF ALDOSTERONE: CPT

## 2024-11-20 PROCEDURE — 80048 BASIC METABOLIC PNL TOTAL CA: CPT

## 2024-11-20 PROCEDURE — 84244 ASSAY OF RENIN: CPT

## 2024-11-20 PROCEDURE — 83835 ASSAY OF METANEPHRINES: CPT

## 2024-11-20 PROCEDURE — 36415 COLL VENOUS BLD VENIPUNCTURE: CPT

## 2024-11-20 NOTE — RESULT ENCOUNTER NOTE
Hello -    Here are my comments about the recent results: Potassium level is normal, kidney function is mildly elevated.  Please stay hydrated (drinking water about 8 glasses/day).  We can discontinue losartan.  Monitor blood pressure at home and let me know if persistent greater than 140/90.  We can recheck labs with your next blood draw.    Regards,   Antolin Millan MD

## 2024-11-21 LAB — DHEA-S SERPL-MCNC: 64 UG/DL (ref 35–430)

## 2024-11-22 LAB — ALDOST SERPL-MCNC: 62.1 NG/DL (ref 0–31)

## 2024-11-23 ENCOUNTER — LAB (OUTPATIENT)
Dept: LAB | Facility: CLINIC | Age: 59
End: 2024-11-23
Payer: COMMERCIAL

## 2024-11-23 DIAGNOSIS — E27.9 ADRENAL NODULE (H): ICD-10-CM

## 2024-11-23 DIAGNOSIS — E27.9 ADRENAL NODULE (H): Primary | ICD-10-CM

## 2024-11-23 LAB — CORTIS 8H P 1 MG DEX SERPL-MCNC: 2.9 UG/DL

## 2024-11-23 PROCEDURE — 82533 TOTAL CORTISOL: CPT

## 2024-11-23 PROCEDURE — 82024 ASSAY OF ACTH: CPT

## 2024-11-23 PROCEDURE — 36415 COLL VENOUS BLD VENIPUNCTURE: CPT

## 2024-11-23 NOTE — PROGRESS NOTES
Patient called as lab did not have orders for 1 mg DST.     Orders entered    Mark Alvarenga MD  Endocrinology Fellow

## 2024-11-24 DIAGNOSIS — E27.9 ADRENAL NODULE (H): Primary | ICD-10-CM

## 2024-11-24 LAB
ANNOTATION COMMENT IMP: NORMAL
METANEPHS SERPL-SCNC: 0.12 NMOL/L
NORMETANEPHRINE SERPL-SCNC: 0.42 NMOL/L
RENIN PLAS-CCNC: 2.4 NG/ML/HR

## 2024-11-24 RX ORDER — DEXAMETHASONE 4 MG/1
8 TABLET ORAL ONCE
OUTPATIENT
Start: 2024-11-24

## 2024-11-24 NOTE — RESULT ENCOUNTER NOTE
Hello -    Here are my comments about the recent results: 1mg dexamethasone suppression test is positive, recommend additional testing with similar test but higher dose dexamethasone.  Please take dexamethasone 8 mg (ordered) between 11 pm and midnight, then get 8 am blood work the next morning.    Regards,   Antolin Millan MD

## 2024-11-25 LAB
ACTH PLAS-MCNC: <10 PG/ML
ALDOST/RENIN PLAS-RTO: 25.9 {RATIO} (ref 0–25)

## 2024-11-26 ENCOUNTER — TELEPHONE (OUTPATIENT)
Dept: ENDOCRINOLOGY | Facility: CLINIC | Age: 59
End: 2024-11-26
Payer: COMMERCIAL

## 2024-11-26 DIAGNOSIS — E26.9 HIGH ALDOSTERONE (H): Primary | ICD-10-CM

## 2024-11-26 DIAGNOSIS — E27.9 ADRENAL NODULE (H): ICD-10-CM

## 2024-11-26 RX ORDER — DEXAMETHASONE 4 MG/1
8 TABLET ORAL ONCE
Qty: 2 TABLET | Refills: 0 | Status: SHIPPED | OUTPATIENT
Start: 2024-11-26 | End: 2024-11-26

## 2024-11-26 NOTE — TELEPHONE ENCOUNTER
[facility] Call Center    Phone Message    May a detailed message be left on voicemail: yes     Reason for Call: Other: patient needs her dexAMETHasone (DECADRON) tablet 8 mg [6310] (Order [id number]) - Reflex for Order [id number] sent to [facility] please advise on why this wasn't sent.     Action Taken: Message routed to:  [facility] endo    Travel Screening: Not Applicable     Date of Service:

## 2024-11-30 ENCOUNTER — APPOINTMENT (OUTPATIENT)
Dept: LAB | Facility: CLINIC | Age: 59
End: 2024-11-30
Payer: COMMERCIAL

## 2024-11-30 PROCEDURE — 80048 BASIC METABOLIC PNL TOTAL CA: CPT | Performed by: INTERNAL MEDICINE

## 2024-11-30 PROCEDURE — 82533 TOTAL CORTISOL: CPT | Performed by: INTERNAL MEDICINE

## 2024-11-30 PROCEDURE — 82565 ASSAY OF CREATININE: CPT | Performed by: INTERNAL MEDICINE

## 2024-11-30 PROCEDURE — 82024 ASSAY OF ACTH: CPT | Performed by: INTERNAL MEDICINE

## 2024-11-30 PROCEDURE — 84132 ASSAY OF SERUM POTASSIUM: CPT | Performed by: INTERNAL MEDICINE

## 2024-12-03 ENCOUNTER — TELEPHONE (OUTPATIENT)
Dept: ENDOCRINOLOGY | Facility: CLINIC | Age: 59
End: 2024-12-03
Payer: COMMERCIAL

## 2024-12-03 ENCOUNTER — HOSPITAL ENCOUNTER (OUTPATIENT)
Facility: CLINIC | Age: 59
Setting detail: OBSERVATION
Discharge: HOME | End: 2024-12-04
Attending: EMERGENCY MEDICINE | Admitting: EMERGENCY MEDICINE
Payer: COMMERCIAL

## 2024-12-03 DIAGNOSIS — F41.9 ANXIETY: ICD-10-CM

## 2024-12-03 DIAGNOSIS — R73.03 PREDIABETES: ICD-10-CM

## 2024-12-03 DIAGNOSIS — I10 ESSENTIAL HYPERTENSION: Primary | ICD-10-CM

## 2024-12-03 DIAGNOSIS — I10 ESSENTIAL HYPERTENSION: ICD-10-CM

## 2024-12-03 DIAGNOSIS — N18.9 ACUTE RENAL FAILURE SUPERIMPOSED ON CHRONIC KIDNEY DISEASE, UNSPECIFIED ACUTE RENAL FAILURE TYPE, UNSPECIFIED CKD STAGE (H): ICD-10-CM

## 2024-12-03 DIAGNOSIS — N17.9 ACUTE RENAL FAILURE SUPERIMPOSED ON CHRONIC KIDNEY DISEASE, UNSPECIFIED ACUTE RENAL FAILURE TYPE, UNSPECIFIED CKD STAGE (H): ICD-10-CM

## 2024-12-03 DIAGNOSIS — E26.9 HIGH ALDOSTERONE (H): ICD-10-CM

## 2024-12-03 LAB
ALBUMIN SERPL BCG-MCNC: 4.6 G/DL (ref 3.5–5.2)
ALBUMIN UR-MCNC: NEGATIVE MG/DL
ALP SERPL-CCNC: 72 U/L (ref 40–150)
ALT SERPL W P-5'-P-CCNC: 20 U/L (ref 0–50)
ANION GAP SERPL CALCULATED.3IONS-SCNC: 12 MMOL/L (ref 7–15)
APPEARANCE UR: CLEAR
AST SERPL W P-5'-P-CCNC: 25 U/L (ref 0–45)
BACTERIA #/AREA URNS HPF: ABNORMAL /HPF
BASE EXCESS BLDV CALC-SCNC: -1.8 MMOL/L (ref -3–3)
BILIRUB SERPL-MCNC: 0.5 MG/DL
BILIRUB UR QL STRIP: NEGATIVE
BUN SERPL-MCNC: 44.5 MG/DL (ref 6–20)
CALCIUM SERPL-MCNC: 9.2 MG/DL (ref 8.8–10.4)
CHLORIDE SERPL-SCNC: 103 MMOL/L (ref 98–107)
COLOR UR AUTO: COLORLESS
CREAT SERPL-MCNC: 1.73 MG/DL (ref 0.51–0.95)
CREAT UR-MCNC: 18.7 MG/DL
EGFRCR SERPLBLD CKD-EPI 2021: 34 ML/MIN/1.73M2
GLUCOSE SERPL-MCNC: 103 MG/DL (ref 70–99)
GLUCOSE UR STRIP-MCNC: NEGATIVE MG/DL
HCO3 BLDV-SCNC: 24 MMOL/L (ref 21–28)
HCO3 SERPL-SCNC: 22 MMOL/L (ref 22–29)
HGB UR QL STRIP: NEGATIVE
HOLD SPECIMEN: NORMAL
HYALINE CASTS: 1 /LPF
KETONES UR STRIP-MCNC: NEGATIVE MG/DL
LEUKOCYTE ESTERASE UR QL STRIP: NEGATIVE
NITRATE UR QL: NEGATIVE
O2/TOTAL GAS SETTING VFR VENT: 21 %
OXYHGB MFR BLDV: 91 % (ref 70–75)
PCO2 BLDV: 41 MM HG (ref 40–50)
PH BLDV: 7.37 [PH] (ref 7.32–7.43)
PH UR STRIP: 6 [PH] (ref 5–7)
PO2 BLDV: 60 MM HG (ref 25–47)
POTASSIUM SERPL-SCNC: 4.2 MMOL/L (ref 3.4–5.3)
PROT SERPL-MCNC: 7.9 G/DL (ref 6.4–8.3)
RBC URINE: <1 /HPF
SAO2 % BLDV: 91.7 % (ref 70–75)
SODIUM SERPL-SCNC: 137 MMOL/L (ref 135–145)
SODIUM UR-SCNC: 85 MMOL/L
SP GR UR STRIP: 1.01 (ref 1–1.03)
SQUAMOUS EPITHELIAL: <1 /HPF
UROBILINOGEN UR STRIP-MCNC: <2 MG/DL
WBC URINE: 2 /HPF

## 2024-12-03 PROCEDURE — 84300 ASSAY OF URINE SODIUM: CPT | Performed by: INTERNAL MEDICINE

## 2024-12-03 PROCEDURE — 99285 EMERGENCY DEPT VISIT HI MDM: CPT | Mod: 25

## 2024-12-03 PROCEDURE — 96360 HYDRATION IV INFUSION INIT: CPT

## 2024-12-03 PROCEDURE — 36415 COLL VENOUS BLD VENIPUNCTURE: CPT | Performed by: EMERGENCY MEDICINE

## 2024-12-03 PROCEDURE — 82805 BLOOD GASES W/O2 SATURATION: CPT | Performed by: EMERGENCY MEDICINE

## 2024-12-03 PROCEDURE — 250N000011 HC RX IP 250 OP 636: Performed by: INTERNAL MEDICINE

## 2024-12-03 PROCEDURE — 258N000003 HC RX IP 258 OP 636: Performed by: EMERGENCY MEDICINE

## 2024-12-03 PROCEDURE — 81003 URINALYSIS AUTO W/O SCOPE: CPT | Performed by: INTERNAL MEDICINE

## 2024-12-03 PROCEDURE — G0378 HOSPITAL OBSERVATION PER HR: HCPCS

## 2024-12-03 PROCEDURE — 82570 ASSAY OF URINE CREATININE: CPT | Performed by: INTERNAL MEDICINE

## 2024-12-03 PROCEDURE — 84540 ASSAY OF URINE/UREA-N: CPT | Performed by: INTERNAL MEDICINE

## 2024-12-03 PROCEDURE — 80053 COMPREHEN METABOLIC PANEL: CPT | Performed by: EMERGENCY MEDICINE

## 2024-12-03 PROCEDURE — 96372 THER/PROPH/DIAG INJ SC/IM: CPT | Mod: 59 | Performed by: INTERNAL MEDICINE

## 2024-12-03 PROCEDURE — 96361 HYDRATE IV INFUSION ADD-ON: CPT

## 2024-12-03 RX ORDER — ALBUTEROL SULFATE 90 UG/1
2 INHALANT RESPIRATORY (INHALATION) EVERY 6 HOURS PRN
Status: DISCONTINUED | OUTPATIENT
Start: 2024-12-03 | End: 2024-12-04 | Stop reason: HOSPADM

## 2024-12-03 RX ORDER — ONDANSETRON 4 MG/1
4 TABLET, ORALLY DISINTEGRATING ORAL EVERY 6 HOURS PRN
Status: DISCONTINUED | OUTPATIENT
Start: 2024-12-03 | End: 2024-12-04 | Stop reason: HOSPADM

## 2024-12-03 RX ORDER — ONDANSETRON 2 MG/ML
4 INJECTION INTRAMUSCULAR; INTRAVENOUS EVERY 6 HOURS PRN
Status: DISCONTINUED | OUTPATIENT
Start: 2024-12-03 | End: 2024-12-04 | Stop reason: HOSPADM

## 2024-12-03 RX ORDER — PROCHLORPERAZINE MALEATE 10 MG
10 TABLET ORAL EVERY 6 HOURS PRN
Status: DISCONTINUED | OUTPATIENT
Start: 2024-12-03 | End: 2024-12-04 | Stop reason: HOSPADM

## 2024-12-03 RX ORDER — DEXTROSE MONOHYDRATE 25 G/50ML
25-50 INJECTION, SOLUTION INTRAVENOUS
Status: DISCONTINUED | OUTPATIENT
Start: 2024-12-03 | End: 2024-12-04 | Stop reason: HOSPADM

## 2024-12-03 RX ORDER — AMOXICILLIN 250 MG
1 CAPSULE ORAL 2 TIMES DAILY PRN
Status: DISCONTINUED | OUTPATIENT
Start: 2024-12-03 | End: 2024-12-04 | Stop reason: HOSPADM

## 2024-12-03 RX ORDER — GABAPENTIN 300 MG/1
300 CAPSULE ORAL 3 TIMES DAILY
Status: DISCONTINUED | OUTPATIENT
Start: 2024-12-04 | End: 2024-12-04 | Stop reason: HOSPADM

## 2024-12-03 RX ORDER — HYDROXYZINE HYDROCHLORIDE 25 MG/1
25 TABLET, FILM COATED ORAL
Status: DISCONTINUED | OUTPATIENT
Start: 2024-12-03 | End: 2024-12-04 | Stop reason: HOSPADM

## 2024-12-03 RX ORDER — SODIUM CHLORIDE 9 MG/ML
INJECTION, SOLUTION INTRAVENOUS CONTINUOUS
Status: ACTIVE | OUTPATIENT
Start: 2024-12-03 | End: 2024-12-04

## 2024-12-03 RX ORDER — AMOXICILLIN 250 MG
2 CAPSULE ORAL 2 TIMES DAILY PRN
Status: DISCONTINUED | OUTPATIENT
Start: 2024-12-03 | End: 2024-12-04 | Stop reason: HOSPADM

## 2024-12-03 RX ORDER — NICOTINE POLACRILEX 4 MG
15-30 LOZENGE BUCCAL
Status: DISCONTINUED | OUTPATIENT
Start: 2024-12-03 | End: 2024-12-04 | Stop reason: HOSPADM

## 2024-12-03 RX ORDER — ENOXAPARIN SODIUM 100 MG/ML
40 INJECTION SUBCUTANEOUS EVERY 24 HOURS
Status: DISCONTINUED | OUTPATIENT
Start: 2024-12-03 | End: 2024-12-04 | Stop reason: HOSPADM

## 2024-12-03 RX ORDER — HYDRALAZINE HYDROCHLORIDE 20 MG/ML
10 INJECTION INTRAMUSCULAR; INTRAVENOUS EVERY 6 HOURS PRN
Status: DISCONTINUED | OUTPATIENT
Start: 2024-12-03 | End: 2024-12-04 | Stop reason: HOSPADM

## 2024-12-03 RX ORDER — VITAMIN B COMPLEX
25 TABLET ORAL DAILY
Status: DISCONTINUED | OUTPATIENT
Start: 2024-12-04 | End: 2024-12-04 | Stop reason: HOSPADM

## 2024-12-03 RX ADMIN — ENOXAPARIN SODIUM 40 MG: 40 INJECTION SUBCUTANEOUS at 22:40

## 2024-12-03 RX ADMIN — SODIUM CHLORIDE 1000 ML: 9 INJECTION, SOLUTION INTRAVENOUS at 21:46

## 2024-12-03 ASSESSMENT — ACTIVITIES OF DAILY LIVING (ADL)
ADLS_ACUITY_SCORE: 41

## 2024-12-03 ASSESSMENT — COLUMBIA-SUICIDE SEVERITY RATING SCALE - C-SSRS
6. HAVE YOU EVER DONE ANYTHING, STARTED TO DO ANYTHING, OR PREPARED TO DO ANYTHING TO END YOUR LIFE?: NO
1. IN THE PAST MONTH, HAVE YOU WISHED YOU WERE DEAD OR WISHED YOU COULD GO TO SLEEP AND NOT WAKE UP?: NO
2. HAVE YOU ACTUALLY HAD ANY THOUGHTS OF KILLING YOURSELF IN THE PAST MONTH?: NO

## 2024-12-03 ASSESSMENT — ENCOUNTER SYMPTOMS: HEADACHES: 1

## 2024-12-03 NOTE — TELEPHONE ENCOUNTER
Called and LVM to discuss about medications and lab result.  Will try to communicate again.    Update 1700  Called patient, she would like a call back after 7 pm    Update 1920  Pt reports Highest /93 before she saw me in clinic 11/20/24  Losartan and potassium was discontinued 1 week before patient saw me  Current medication is amlodipine 5 mg daily, spironolactone 2 tablets (total of 200 mg/day)  Most recent /80- 145/84  Overall, feeling better.  Patient will travel to the [facility] 12/5/24 and return 1/8/25.  Will most likely continue current regimen of BP medications (Amlodipine 5 mg daily and spironolactone 200 mg daily). Will discuss preparation before AVS for primary hyperaldosteronism localization when patient returns from the [facility].    Regarding most recent labs (add on BMP today from sample 11/30/24) showing high gap metabolic acidosis, patient is feeling well. Recommend recheck. Given her up coming flight to the [facility] 12/5/24, and unable to do labs tomorrow until after 3 pm, she will have it recheck tonight.    Called labs at [facility] and they are closed, patient needs to go through the ED. Called ED and discussed about the patient.    12/04/24  0728  Record reviewed, patient is admitted on observation.  If discharge, will plan to see patient for follow up in clinic.  Will block spot at noon for now and will have our clinic contact the patient.

## 2024-12-04 ENCOUNTER — TELEPHONE (OUTPATIENT)
Dept: ENDOCRINOLOGY | Facility: CLINIC | Age: 59
End: 2024-12-04

## 2024-12-04 ENCOUNTER — OFFICE VISIT (OUTPATIENT)
Dept: ENDOCRINOLOGY | Facility: CLINIC | Age: 59
End: 2024-12-04
Payer: COMMERCIAL

## 2024-12-04 VITALS
DIASTOLIC BLOOD PRESSURE: 65 MMHG | RESPIRATION RATE: 18 BRPM | BODY MASS INDEX: 29.27 KG/M2 | HEART RATE: 59 BPM | OXYGEN SATURATION: 100 % | WEIGHT: 155 LBS | TEMPERATURE: 98.3 F | SYSTOLIC BLOOD PRESSURE: 144 MMHG | HEIGHT: 61 IN

## 2024-12-04 VITALS — SYSTOLIC BLOOD PRESSURE: 146 MMHG | DIASTOLIC BLOOD PRESSURE: 78 MMHG | HEART RATE: 105 BPM | OXYGEN SATURATION: 98 %

## 2024-12-04 DIAGNOSIS — E26.9 HIGH ALDOSTERONE (H): ICD-10-CM

## 2024-12-04 DIAGNOSIS — I10 ESSENTIAL HYPERTENSION: Primary | ICD-10-CM

## 2024-12-04 LAB
ANION GAP SERPL CALCULATED.3IONS-SCNC: 9 MMOL/L (ref 7–15)
BUN SERPL-MCNC: 31.6 MG/DL (ref 6–20)
CALCIUM SERPL-MCNC: 9.2 MG/DL (ref 8.8–10.4)
CHLORIDE SERPL-SCNC: 107 MMOL/L (ref 98–107)
CREAT SERPL-MCNC: 1.09 MG/DL (ref 0.51–0.95)
EGFRCR SERPLBLD CKD-EPI 2021: 59 ML/MIN/1.73M2
GLUCOSE BLDC GLUCOMTR-MCNC: 95 MG/DL (ref 70–99)
GLUCOSE SERPL-MCNC: 99 MG/DL (ref 70–99)
HCO3 SERPL-SCNC: 23 MMOL/L (ref 22–29)
POTASSIUM SERPL-SCNC: 4 MMOL/L (ref 3.4–5.3)
SODIUM SERPL-SCNC: 139 MMOL/L (ref 135–145)
UUN UR-MCNC: 234 MG/DL (ref 801–1666)

## 2024-12-04 PROCEDURE — 80048 BASIC METABOLIC PNL TOTAL CA: CPT | Performed by: INTERNAL MEDICINE

## 2024-12-04 PROCEDURE — 258N000003 HC RX IP 258 OP 636: Performed by: INTERNAL MEDICINE

## 2024-12-04 PROCEDURE — 96361 HYDRATE IV INFUSION ADD-ON: CPT

## 2024-12-04 PROCEDURE — 250N000013 HC RX MED GY IP 250 OP 250 PS 637: Performed by: INTERNAL MEDICINE

## 2024-12-04 PROCEDURE — 36415 COLL VENOUS BLD VENIPUNCTURE: CPT | Performed by: INTERNAL MEDICINE

## 2024-12-04 PROCEDURE — G0378 HOSPITAL OBSERVATION PER HR: HCPCS

## 2024-12-04 PROCEDURE — 250N000013 HC RX MED GY IP 250 OP 250 PS 637: Performed by: FAMILY MEDICINE

## 2024-12-04 PROCEDURE — 82962 GLUCOSE BLOOD TEST: CPT

## 2024-12-04 RX ORDER — SPIRONOLACTONE 100 MG/1
TABLET, FILM COATED ORAL
COMMUNITY
Start: 2024-12-04 | End: 2024-12-04

## 2024-12-04 RX ORDER — HYDRALAZINE HYDROCHLORIDE 20 MG/ML
10 INJECTION INTRAMUSCULAR; INTRAVENOUS EVERY 6 HOURS PRN
Status: DISCONTINUED | OUTPATIENT
Start: 2024-12-04 | End: 2024-12-04

## 2024-12-04 RX ORDER — AMLODIPINE BESYLATE 5 MG/1
5 TABLET ORAL DAILY
Status: DISCONTINUED | OUTPATIENT
Start: 2024-12-04 | End: 2024-12-04 | Stop reason: HOSPADM

## 2024-12-04 RX ORDER — METFORMIN HYDROCHLORIDE 500 MG/1
TABLET, EXTENDED RELEASE ORAL
COMMUNITY
Start: 2024-12-04

## 2024-12-04 RX ORDER — HYDROXYZINE PAMOATE 25 MG/1
25 CAPSULE ORAL
COMMUNITY
Start: 2024-12-04

## 2024-12-04 RX ORDER — SPIRONOLACTONE 100 MG/1
TABLET, FILM COATED ORAL
Qty: 90 TABLET | Refills: 0 | Status: SHIPPED | OUTPATIENT
Start: 2024-12-04

## 2024-12-04 RX ADMIN — Medication 25 MCG: at 08:02

## 2024-12-04 RX ADMIN — GABAPENTIN 300 MG: 300 CAPSULE ORAL at 08:01

## 2024-12-04 RX ADMIN — SODIUM CHLORIDE: 9 INJECTION, SOLUTION INTRAVENOUS at 01:13

## 2024-12-04 RX ADMIN — AMLODIPINE BESYLATE 5 MG: 5 TABLET ORAL at 08:02

## 2024-12-04 ASSESSMENT — ACTIVITIES OF DAILY LIVING (ADL)
ADLS_ACUITY_SCORE: 46

## 2024-12-04 NOTE — LETTER
12/4/2024      Geovanna Sepulveda  [address]  [place] [address]      Dear Colleague,    Thank you for referring your patient, Geovanna Sepulveda, to the [facility] [place]. Please see a copy of my visit note below.    Endocrinology Clinic Visit       Geovanna Sepulveda is a 59 year old female with HTN who is here for Follow-up hyperaldosteronism and recent TRAM.    Interval History  Pt went in to the ED to have labs checked for recent high gap metabolic acidosis.  She was found to have TRAM and IV fluid was given.  Recheck BMP improved.  Pt was discharged and seen in endocrine clinic today to discuss her BP medications for her primary hyperaldosteronism.    Pt reports main change over the past couple weeks are BP medications.  11/6/24 spironolactone was increased from 100 to 200 mg/day, potassium discontinued and decreased losartan to 50mg daily   11/20/24 discontinued losartan  11/30/24 started amlodipine 5 mg, patient has not lowered spironolactone dose    Pt also recently started new job from 7 am-3 pm and she reports she  drinks 8 glasses/day  Overall feeling fine, did not sleep well last night    Initial visit  Pt started to have high BP 2010,   Pt saw primary Sherry CAMPOVERDE CNP, secondary causes of hypertension were checked and found to have primary hyperaldosteronism.    Pt has been off amlodipine and potassium for 1 month  Losartan decrease 0.5 tablet and increase spironolactone to 2 tablets 1 week ago    Dad and sisters with high BP  No FH of stroke/ heart attack  Mom with ovarian cancer  Sister with ovarian mass  Another sister with heart stent 2/2 blockage    [place] to the [place]    Pt works in a factory   with stroke and passed away 2014.       REVIEW OF SYSTEMS  10 point negative except as mentioned in HPI    Past Medical/Surgical History:  Past Medical History:   Diagnosis Date     Hypertension 2012     Past Surgical History:   Procedure Laterality Date      CARPAL TUNNEL RELEASE RT/LT Bilateral 12/2020    [facility]       Medications  Current Outpatient Medications   Medication Sig Dispense Refill     albuterol (PROAIR HFA/PROVENTIL HFA/VENTOLIN HFA) 108 (90 Base) MCG/ACT inhaler Inhale 2 puffs into the lungs every 6 hours as needed for shortness of breath, wheezing or cough. 18 g 3     amLODIPine (NORVASC) 5 MG tablet Take 1 tablet (5 mg) by mouth daily. 60 tablet 0     gabapentin (NEURONTIN) 300 MG capsule Take 1 capsule (300 mg) by mouth 3 times daily. 270 capsule 1     hydrOXYzine makeda (VISTARIL) 25 MG capsule Take 1 capsule (25 mg) by mouth nightly as needed.       metFORMIN (GLUCOPHAGE XR) 500 MG 24 hr tablet Hold for 3 day       spironolactone (ALDACTONE) 100 MG tablet 100 mg daily 90 tablet 0     VITAMIN D3 25 MCG tablet Take 1 tablet by mouth daily       No current facility-administered medications for this visit.       Allergies  Allergies   Allergen Reactions     Other Environmental Allergy Itching     Sulfamethoxazole-Trimethoprim Rash         Family History  family history includes Breast Cancer in her maternal aunt; Diabetes Type 2  in her sister; Hyperlipidemia in her father; Hypertension in her father; Other - See Comments in her sister; Ovarian Cancer (age of onset: 60) in her mother.    Social History  Social History     Tobacco Use     Smoking status: Never     Passive exposure: Never     Smokeless tobacco: Never   Substance Use Topics     Alcohol use: Never       Physical Exam  BP (!) 146/78 (BP Location: Right arm)   Pulse 105   SpO2 98%   There is no height or weight on file to calculate BMI.  GENERAL :  In no apparent distress  EYES: No scleral icterus,  No proptosis  NECK: No visible masses.   RESP: Normal breathing  NEURO: awake, alert, responds appropriately to questions.      DATA REVIEW  Labs/Imaging    Lab Results   Component Value Date    A1C 6.1 10/23/2024    A1C 6.2 05/08/2024    A1C 6.1 10/25/2023    A1C 5.8 11/26/2021         Latest Reference Range & Units 03/20/24 08:07   Potassium 3.4 - 5.3 mmol/L 3.0 (L)   (L): Data is abnormally low   Latest Reference Range & Units 03/20/24 08:07   Aldosterone 0.0 - 31.0 ng/dL 44.2 (H)   (H): Data is abnormally high   Latest Reference Range & Units 03/20/24 08:07   Renin Activity ng/mL/hr 0.8     Creatinine   Date Value Ref Range Status   12/04/2024 1.09 (H) 0.51 - 0.95 mg/dL Final      ASSESSMENT/PLAN:     Geovanna Sepulveda is a 59 year old female with primary hyperaldosteronism and recent TRAM who is here to follow up discuss blood pressure medication.    ## 2.1 cm right adrenal nodule (?HU)  ## Primary hyperaldosteronism  ## Recent acute kidney injury  ## MACE (+ve 1 mg and 8 mg DST) did not discuss today  Today we discussed about effect of primary hyperaldosteronism to blood pressure and potassium level, as well as effects of each medication on potassium and kidney function.  Over the past month, there're several changes in blood pressure medications and potassium supplement, that overall still not stable.  High aldosterone hormone raises blood pressure and lower potassium.  Spironolactone lowers blood pressure, raises potassium and may raise kidney function especially if dehydrated.  Losartan lowers blood pressure, raises potassium and may raise kidney function.  Amlodipine lowers blood pressure but does not have any effect on potassium. The main side effect is swelling.    There's possibility that your kidney injury was from high dose spironolactone and dehydration.  Kidney function improves after IV fluid.  I recommend to lower spironolactone and close monitor blood pressure, kidney function and potassium. Possible recheck Friday and Monday.  However, you have an international flight tomorrow. You could call the airline regarding this new health condition and see if they could change your travel with no cost.  I can give you a letter just in case.    -- decrease spironolactone from 200 mg/day to  100 mg/day  -- continue amlodipine 5 mg/day  -- monitor blood pressure and keep 100//90  -- lab in couple days and will be in communication via mychart or phone      -- ok to hold metformin for now. If fasting glucose >150 consistently can restart if kidney function is ok    Follow-up 1/14/25 to review labs and discuss BP regimen for AVS.        Orders Placed This Encounter   Procedures     Basic metabolic panel     The longitudinal plan of care for the diagnosis(es)/condition(s) as documented were addressed during this visit. Due to the added complexity in care, I will continue to support Geovanna in the subsequent management and with ongoing continuity of care.       Antolin Millan MD        Again, thank you for allowing me to participate in the care of your patient.        Sincerely,        Antolin Millan MD

## 2024-12-04 NOTE — ED NOTES
Expected Patient Referral to ED  7:51 PM    Referring Clinic/Provider:  Endocrine -Dr. Millan    Reason for referral/Clinical facts:  History of primary hyperaldosterone, recent medication changes.  Most recent labs showing high gap metabolic acidosis on 11/30/24 labs.  This was an add-on that just resulted today.  Dr. Millan thinks it's likely lab error.  Pt asymptomatic, otherwise well.  Would not be related to her underlying endocrine condition or meds, so if still abnormal, we should do additional workup.      Pt was set to fly to [facility] on Thursday, so she wanted pt to get labs done tonight.      Recommendations provided:  Send to ED for further evaluation    Caller was informed that this institution does possess the capabilities and/or resources to provide for patient and should be transferred to our facility.    Discussed that if direct admit is sought and any hurdles are encountered, this ED would be happy to see the patient and evaluate.    Informed caller that recommendations provided are recommendations based only on the facts provided and that they responsible to accept or reject the advice, or to seek a formal in person consultation as needed and that this ED will see/treat patient should they arrive.      Elias Zambrano, [facility] EMERGENCY ROOM  [address] [facility] [address] [phone]       Elias Zambrano MD  12/03/24 [id number]

## 2024-12-04 NOTE — MEDICATION SCRIBE - ADMISSION MEDICATION HISTORY
Medication Scribe Admission Medication History    Admission medication history is complete. The information provided in this note is only as accurate as the sources available at the time of the update.    Information Source(s): Patient, Clinic records, and CareEverywhere/SureScripts via in-person    Pertinent Information: Patient is currently on 200 mg of spironolactone daily. Has a known adrenal lesion.     Dexamethasone removed as it is taken prior to lab testing and this was done last week.     Not currently taking atorvastatin or losartan at this time.     Medications available for use during hospital stay: albuterol 90 mcg/act inhaler.      Changes made to PTA medication list:  Added: None  Deleted:   Atorvastatin 20 mg  Losartan 50 mg  Dexamethasone 4 mg - taken prior to lab test only   Changed:   Hydroxyzine pamoate 25 mg at bedtime --> at bedtime PRN  Metformin with dinner --> daily (AM)  Spironolactone 100 mg daily --> 200 mg    Allergies reviewed with patient and updates made in EHR: yes    Medication History Completed By: John Torrez 12/3/2024 9:38 PM    Current Facility-Administered Medications for the 12/3/24 encounter (Hospital Encounter)   Medication    dexAMETHasone (DECADRON) tablet 8 mg     PTA Med List   Medication Sig Last Dose/Taking    albuterol (PROAIR HFA/PROVENTIL HFA/VENTOLIN HFA) 108 (90 Base) MCG/ACT inhaler Inhale 2 puffs into the lungs every 6 hours as needed for shortness of breath, wheezing or cough. Past Week    amLODIPine (NORVASC) 5 MG tablet Take 1 tablet (5 mg) by mouth daily. 12/3/2024 Morning    gabapentin (NEURONTIN) 300 MG capsule Take 1 capsule (300 mg) by mouth 3 times daily. 12/3/2024 Morning    hydrOXYzine makeda (VISTARIL) 25 MG capsule Take 1 capsule (25 mg) by mouth at bedtime (Patient taking differently: Take 25 mg by mouth nightly as needed.) More than a month    metFORMIN (GLUCOPHAGE XR) 500 MG 24 hr tablet Take 1 tablet (500 mg) by mouth daily (with dinner).  (Patient taking differently: Take 500 mg by mouth daily.) 12/3/2024 Morning    spironolactone (ALDACTONE) 100 MG tablet Take 1 tablet (100 mg) by mouth daily. 12/3/2024 Morning    VITAMIN D3 25 MCG tablet Take 1 tablet by mouth daily 12/3/2024 Morning

## 2024-12-04 NOTE — PROGRESS NOTES
Endocrinology Clinic Visit       Geovanna Sepulveda is a 59 year old female with HTN who is here for Follow-up hyperaldosteronism and recent TRAM.    Interval History  Pt went in to the ED to have labs checked for recent high gap metabolic acidosis.  She was found to have TRAM and IV fluid was given.  Recheck BMP improved.  Pt was discharged and seen in endocrine clinic today to discuss her BP medications for her primary hyperaldosteronism.    Pt reports main change over the past couple weeks are BP medications.  11/6/24 spironolactone was increased from 100 to 200 mg/day, potassium discontinued and decreased losartan to 50mg daily   11/20/24 discontinued losartan  11/30/24 started amlodipine 5 mg, patient has not lowered spironolactone dose    Pt also recently started new job from 7 am-3 pm and she reports she  drinks 8 glasses/day  Overall feeling fine, did not sleep well last night    Initial visit  Pt started to have high BP 2010,   Pt saw primary Sherry CAMPOVERDE CNP, secondary causes of hypertension were checked and found to have primary hyperaldosteronism.    Pt has been off amlodipine and potassium for 1 month  Losartan decrease 0.5 tablet and increase spironolactone to 2 tablets 1 week ago    Dad and sisters with high BP  No FH of stroke/ heart attack  Mom with ovarian cancer  Sister with ovarian mass  Another sister with heart stent 2/2 blockage    [place]    Pt works in a factory   with stroke and passed away 2014.       REVIEW OF SYSTEMS  10 point negative except as mentioned in HPI    Past Medical/Surgical History:  Past Medical History:   Diagnosis Date    Hypertension 2012     Past Surgical History:   Procedure Laterality Date    CARPAL TUNNEL RELEASE RT/LT Bilateral 12/2020    [facility]       Medications  Current Outpatient Medications   Medication Sig Dispense Refill    albuterol (PROAIR HFA/PROVENTIL HFA/VENTOLIN HFA) 108 (90 Base) MCG/ACT inhaler Inhale 2 puffs into  the lungs every 6 hours as needed for shortness of breath, wheezing or cough. 18 g 3    amLODIPine (NORVASC) 5 MG tablet Take 1 tablet (5 mg) by mouth daily. 60 tablet 0    gabapentin (NEURONTIN) 300 MG capsule Take 1 capsule (300 mg) by mouth 3 times daily. 270 capsule 1    hydrOXYzine makeda (VISTARIL) 25 MG capsule Take 1 capsule (25 mg) by mouth nightly as needed.      metFORMIN (GLUCOPHAGE XR) 500 MG 24 hr tablet Hold for 3 day      spironolactone (ALDACTONE) 100 MG tablet 100 mg daily 90 tablet 0    VITAMIN D3 25 MCG tablet Take 1 tablet by mouth daily       No current facility-administered medications for this visit.       Allergies  Allergies   Allergen Reactions    Other Environmental Allergy Itching    Sulfamethoxazole-Trimethoprim Rash         Family History  family history includes Breast Cancer in her maternal aunt; Diabetes Type 2  in her sister; Hyperlipidemia in her father; Hypertension in her father; Other - See Comments in her sister; Ovarian Cancer (age of onset: 60) in her mother.    Social History  Social History     Tobacco Use    Smoking status: Never     Passive exposure: Never    Smokeless tobacco: Never   Substance Use Topics    Alcohol use: Never       Physical Exam  BP (!) 146/78 (BP Location: Right arm)   Pulse 105   SpO2 98%   There is no height or weight on file to calculate BMI.  GENERAL :  In no apparent distress  EYES: No scleral icterus,  No proptosis  NECK: No visible masses.   RESP: Normal breathing  NEURO: awake, alert, responds appropriately to questions.      DATA REVIEW  Labs/Imaging    Lab Results   Component Value Date    A1C 6.1 10/23/2024    A1C 6.2 05/08/2024    A1C 6.1 10/25/2023    A1C 5.8 11/26/2021        Latest Reference Range & Units 03/20/24 08:07   Potassium 3.4 - 5.3 mmol/L 3.0 (L)   (L): Data is abnormally low   Latest Reference Range & Units 03/20/24 08:07   Aldosterone 0.0 - 31.0 ng/dL 44.2 (H)   (H): Data is abnormally high   Latest Reference Range & Units  03/20/24 08:07   Renin Activity ng/mL/hr 0.8     Creatinine   Date Value Ref Range Status   12/04/2024 1.09 (H) 0.51 - 0.95 mg/dL Final      ASSESSMENT/PLAN:     Geovanna Sepulveda is a 59 year old female with primary hyperaldosteronism and recent TRAM who is here to follow up discuss blood pressure medication.    ## 2.1 cm right adrenal nodule (?HU)  ## Primary hyperaldosteronism  ## Recent acute kidney injury  ## MACE (+ve 1 mg and 8 mg DST) did not discuss today  Today we discussed about effect of primary hyperaldosteronism to blood pressure and potassium level, as well as effects of each medication on potassium and kidney function.  Over the past month, there're several changes in blood pressure medications and potassium supplement, that overall still not stable.  High aldosterone hormone raises blood pressure and lower potassium.  Spironolactone lowers blood pressure, raises potassium and may raise kidney function especially if dehydrated.  Losartan lowers blood pressure, raises potassium and may raise kidney function.  Amlodipine lowers blood pressure but does not have any effect on potassium. The main side effect is swelling.    There's possibility that your kidney injury was from high dose spironolactone and dehydration.  Kidney function improves after IV fluid.  I recommend to lower spironolactone and close monitor blood pressure, kidney function and potassium. Possible recheck Friday and Monday.  However, you have an international flight tomorrow. You could call the airline regarding this new health condition and see if they could change your travel with no cost.  I can give you a letter just in case.    -- decrease spironolactone from 200 mg/day to 100 mg/day  -- continue amlodipine 5 mg/day  -- monitor blood pressure and keep 100//90  -- lab in couple days and will be in communication via nC[employer] Cloud or phone      -- ok to hold metformin for now. If fasting glucose >150 consistently can restart if kidney  function is ok    Follow-up 1/14/25 to review labs and discuss BP regimen for AVS.        Orders Placed This Encounter   Procedures    Basic metabolic panel     The longitudinal plan of care for the diagnosis(es)/condition(s) as documented were addressed during this visit. Due to the added complexity in care, I will continue to support Geovanna in the subsequent management and with ongoing continuity of care.       Antolin Millan MD

## 2024-12-04 NOTE — PATIENT INSTRUCTIONS
## 2.1 cm right adrenal nodule  ## Primary hyperaldosteronism  ## Recent acute kidney injury  Today we discussed about effect of primary hyperaldosteronism to blood pressure and potassium level, as well as effects of each medication on potassium and kidney function.  Over the past month, there're several changes in your blood pressure medication and potassium supplement, that overall still not stable.  High aldosterone hormone raises blood pressure and lower potassium.  Spironolactone lowers blood pressure, raises potassium and may raise kidney function especially if dehydrated.  Losartan lowers blood pressure, raises potassium and may raise kidney function.  Amlodipine lowers blood pressure but does not have any effect on potassium. The main side effect is swelling.    There's possibility that your kidney injury was from high dose spironolactone and dehydration.  Kidney function improves after IV fluid.  I recommend to lower spironolactone and close monitor blood pressure, kidney function and potassium. Possible recheck Friday and Monday.  However, you have an international flight tomorrow. You could call the airline regarding this new health condition and see if they could change your travel with no cost.  I can give you a letter just in case.    -- decrease spironolactone from 200 mg/day to 100 mg/day  -- continue amlodipine 5 mg/day  -- monitor blood pressure and keep 100//90  -- lab in couple days    -- ok to hold metformin for now. If fasting glucose >150 consistently can restart if kidney function is ok    Follow-up 1/14/25

## 2024-12-04 NOTE — ED TRIAGE NOTES
"Anesthesia Post Evaluation    Patient: Flakita Lin    Procedure(s) Performed: Procedure(s) (LRB):  REPLACEMENT, INTERVERTEBRAL DISC, CERVICAL, TOTAL (N/A)    Final Anesthesia Type: general  Patient location during evaluation: PACU  Patient participation: Yes- Able to Participate  Level of consciousness: awake and alert  Post-procedure vital signs: reviewed and stable  Pain management: adequate  Airway patency: patent  PONV status at discharge: No PONV  Anesthetic complications: no      Cardiovascular status: blood pressure returned to baseline and hemodynamically stable  Respiratory status: spontaneous ventilation, unassisted and room air  Hydration status: euvolemic  Follow-up not needed.        Visit Vitals  /70 (BP Location: Left arm, Patient Position: Lying)   Pulse 64   Temp 36.7 °C (98 °F) (Temporal)   Resp 11   Ht 5' 7" (1.702 m)   Wt 59 kg (130 lb)   LMP 12/28/2018   SpO2 100%   Breastfeeding? No   BMI 20.36 kg/m²       Pain/Vianney Score: Pain Rating Prior to Med Admin: 6 (1/8/2019 11:10 AM)  Pain Rating Post Med Admin: 5 (1/8/2019 11:21 AM)  Vianney Score: 10 (1/8/2019 11:10 AM)        " Pt is here to have some labs redone, by her endocrine doctor.  Pt has a mild headache in the temporal regions. Pt has a history of high blood pressure.       Triage Assessment (Adult)       Row Name 12/03/24 2014          Triage Assessment    Airway WDL WDL        Respiratory WDL    Respiratory WDL WDL        Skin Circulation/Temperature WDL    Skin Circulation/Temperature WDL WDL        Cardiac WDL    Cardiac WDL WDL        Peripheral/Neurovascular WDL    Peripheral Neurovascular WDL WDL        Cognitive/Neuro/Behavioral WDL    Cognitive/Neuro/Behavioral WDL WDL

## 2024-12-04 NOTE — ED PROVIDER NOTES
EMERGENCY DEPARTMENT ENCOUNTER      NAME: Geovanna Sepulveda  AGE: 59 year old female  YOB: 1965  MRN: [id number]  EVALUATION DATE & TIME: 12/3/2024  8:23 PM    PCP: Sherry Jenkins    ED PROVIDER: Elias Colbert M.D.      Chief Complaint   Patient presents with    Abnormal Labs         FINAL IMPRESSION:  1.  Acute renal insufficiency.      ED COURSE & MEDICAL DECISION MAKIN:46 PM I met with the patient to gather history and to perform my initial exam. We discussed plans for the ED course, including diagnostic testing and treatment. PPE worn: cloth mask.  Patient sent in by her endocrinologist for repeat evaluation of possible elevated anion gap metabolic acidosis.  Had lab work done yesterday which showed an anion gap of 18, sugar of 156, BUN of 41 and a bicarbonate of 16.  Patient has no symptoms.  She is here for repeat lab work.  9:00 PM I updated the patient on laboratory results.  Venous blood gas unremarkable.  LFTs unremarkable.  Chemistries with normal electrolytes, bicarbonate.  BUN/creatinine up to 45 and 1.73 from previous normal values.  No evidence for elevated anion gap with normal sugar and gap of 12.  A liter of normal saline was ordered.  Plan admit patient to [facility] observation and repeat chemistries in the morning.  Hopefully things will be improved enough tomorrow that she can go home, preparatory to her trip on Thursday.  9:18 PM.  Hospitalist in agreement with the [facility] observation admission.    Pertinent Labs & Imaging studies reviewed. (See chart for details)  59 year old female presents to the Emergency Department for evaluation of possible abnormal lab work.    At the conclusion of the encounter I discussed the results of all of the tests and the disposition. The questions were answered. The patient or family acknowledged understanding and was agreeable with the care plan.              Medical Decision Making  Obtained supplemental history:Supplemental history  obtained?: Documented in chart  Reviewed external records: External records reviewed?: Documented in chart and Outpatient Record:    Care impacted by chronic illness:Hyperlipidemia and Hypertension  Did you consider but not order tests?: Work up considered but not performed and documented in chart, if applicable  Did you interpret images independently?: Independent interpretation of ECG and images noted in documentation, when applicable.  Consultation discussion with other provider:Did you involve another provider (consultant, , pharmacy, etc.)?: No  Anticipate probable discharge home after evaluation.    MIPS: Not Applicable        MEDICATIONS GIVEN IN THE EMERGENCY:  Medications - No data to display    NEW PRESCRIPTIONS STARTED AT TODAY'S ER VISIT  New Prescriptions    No medications on file          =================================================================    HPI    Patient information was obtained from: Patient    Use of : N/A       Geovanna SUAZO Coco is a 59 year old female with a pertinent history of hypertension, hyperlipidemia, and chronic pain, who presents to this ED by private car for evaluation of a mild headache. She hasn't taken pain medications for this. She denies a history of headaches. Patient is primarily here for repeat laboratory work. No other medical concerns are expressed at this time.     She does not identify any waxing or waning symptoms otherwise, exacerbating or alleviating features, associated symptoms except as mentioned. She denies any pain related complaints.    Per chart review, patient visited [facility] on 11/20/24, for a referral. Was off amlodipine and potassium for one month, had Losartan decrease to 0.5 tablet and increase spironolactone to 2 tablets week prior to visit. Potassium normal, discontinued Losartan. Renin normal, decreased spironolactone for 4-6 weeks to retest AVS. 200 mg daily to 100 mg. Screening for excess  cortisol secretion positive after 1 and 8 mg DST, on 24.     REVIEW OF SYSTEMS   Review of Systems   Neurological:  Positive for headaches (mild).   All other systems reviewed and are negative.       PAST MEDICAL HISTORY:  Past Medical History:   Diagnosis Date    Hypertension 2012       PAST SURGICAL HISTORY:  Past Surgical History:   Procedure Laterality Date    CARPAL TUNNEL RELEASE RT/LT Bilateral 2020    [facility]           CURRENT MEDICATIONS:    albuterol (PROAIR HFA/PROVENTIL HFA/VENTOLIN HFA) 108 (90 Base) MCG/ACT inhaler  amLODIPine (NORVASC) 5 MG tablet  atorvastatin (LIPITOR) 20 MG tablet  dexAMETHasone (DECADRON) 4 MG tablet  gabapentin (NEURONTIN) 300 MG capsule  hydrOXYzine makeda (VISTARIL) 25 MG capsule  losartan (COZAAR) 50 MG tablet  metFORMIN (GLUCOPHAGE XR) 500 MG 24 hr tablet  spironolactone (ALDACTONE) 100 MG tablet  VITAMIN D3 25 MCG tablet        ALLERGIES:  Allergies   Allergen Reactions    Other Environmental Allergy Itching    Sulfamethoxazole-Trimethoprim Rash       FAMILY HISTORY:  Family History   Problem Relation Age of Onset    Ovarian Cancer Mother 60         from mets to lungs    Hypertension Father     Hyperlipidemia Father     Other - See Comments Sister         ovarian mass - no CA    Diabetes Type 2  Sister     Breast Cancer Maternal Aunt         treated       SOCIAL HISTORY:   Social History     Socioeconomic History    Marital status:      Spouse name: None    Number of children: None    Years of education: None    Highest education level: None   Tobacco Use    Smoking status: Never     Passive exposure: Never    Smokeless tobacco: Never   Vaping Use    Vaping status: Never Used   Substance and Sexual Activity    Alcohol use: Never    Drug use: Never    Sexual activity: Not Currently     Comment: Not applicable   Other Topics Concern    Parent/sibling w/ CABG, MI or angioplasty before 65F 55M? No     Social Drivers of Health     Financial Resource Strain:  "Low Risk  (11/1/2024)    Financial Resource Strain     Within the past 12 months, have you or your family members you live with been unable to get utilities (heat, electricity) when it was really needed?: No   Food Insecurity: Low Risk  (11/1/2024)    Food Insecurity     Within the past 12 months, did you worry that your food would run out before you got money to buy more?: No     Within the past 12 months, did the food you bought just not last and you didn t have money to get more?: No   Transportation Needs: Low Risk  (11/1/2024)    Transportation Needs     Within the past 12 months, has lack of transportation kept you from medical appointments, getting your medicines, non-medical meetings or appointments, work, or from getting things that you need?: No   Physical Activity: Insufficiently Active (11/1/2024)    Exercise Vital Sign     Days of Exercise per Week: 3 days     Minutes of Exercise per Session: 30 min   Stress: No Stress Concern Present (11/1/2024)    [employer] - Occupational Stress Questionnaire     Feeling of Stress : Not at all   Social Connections: Unknown (11/1/2024)    Social Connection and Isolation Panel [NHANES]     Frequency of Social Gatherings with Friends and Family: Once a week   Interpersonal Safety: Low Risk  (11/6/2024)    Interpersonal Safety     Do you feel physically and emotionally safe where you currently live?: Yes     Within the past 12 months, have you been hit, slapped, kicked or otherwise physically hurt by someone?: No     Within the past 12 months, have you been humiliated or emotionally abused in other ways by your partner or ex-partner?: No   Housing Stability: Low Risk  (11/1/2024)    Housing Stability     Do you have housing? : Yes     Are you worried about losing your housing?: No       VITALS:  BP (!) 162/74   Pulse 73   Temp 97.4  F (36.3  C) (Oral)   Resp 18   Ht 1.549 m (5' 1\")   Wt 70.3 kg (155 lb)   SpO2 96%   BMI 29.29 kg/m  " "    PHYSICAL EXAM    Vital Signs:  BP (!) 162/74   Pulse 73   Temp 97.4  F (36.3  C) (Oral)   Resp 18   Ht 1.549 m (5' 1\")   Wt 70.3 kg (155 lb)   SpO2 96%   BMI 29.29 kg/m    General:  On entering the room she is in no apparent distress.    Neck:  Neck supple with full range of motion and nontender.    Back:  Back and spine are nontender.  No costovertebral angle tenderness.    HEENT:  Oropharynx clear with moist mucous membranes.  HEENT unremarkable.    Pulmonary:  Chest clear to auscultation without rhonchi rales or wheezing.    Cardiovascular:  Cardiac regular rate and rhythm without murmurs rubs or gallops.    Abdomen:  Abdomen soft nontender.  There is no rebound or guarding.    Muskuloskeletal:  She moves all 4 without any difficulty and has normal neurovascular exams.  Extremities without clubbing, cyanosis, or edema.  Legs and calves are nontender.    Neuro:  She is alert and oriented ×3 and moves all extremities symmetrically.    Psych:  Normal affect.    Skin:  Unremarkable and warm and dry.       LAB:  All pertinent labs reviewed and interpreted.  Labs Ordered and Resulted from Time of ED Arrival to Time of ED Departure   COMPREHENSIVE METABOLIC PANEL - Abnormal       Result Value    Sodium 137      Potassium 4.2      Carbon Dioxide (CO2) 22      Anion Gap 12      Urea Nitrogen 44.5 (*)     Creatinine 1.73 (*)     GFR Estimate 34 (*)     Calcium 9.2      Chloride 103      Glucose 103 (*)     Alkaline Phosphatase 72      AST 25      ALT 20      Protein Total 7.9      Albumin 4.6      Bilirubin Total 0.5     BLOOD GAS VENOUS - Abnormal    pH Venous 7.37      pCO2 Venous 41      pO2 Venous 60 (*)     Bicarbonate Venous 24      Base Excess/Deficit Venous -1.8      FIO2 21      Oxyhemoglobin Venous 91 (*)     O2 Sat, Venous 91.7 (*)        RADIOLOGY:  Reviewed all pertinent imaging. Please see official radiology report.  No orders to display              EKG:        PROCEDURES:         IAlonso " Yin, am serving as a scribe to document services personally performed by Dr. Colbert based on my observation and the provider's statements to me. I, Elias Colbert MD attest that Alonso Yin is acting in a scribe capacity, has observed my performance of the services and has documented them in accordance with my direction.    Elias Colbert M.D.  Emergency Medicine  [facility] EMERGENCY ROOM  [facility] [facility] [address]  [phone]  Dept: [phone]       Elias Colbert MD  12/03/24 2108       Elias Colbert MD  12/03/24 2118

## 2024-12-04 NOTE — PROGRESS NOTES
PRIMARY DIAGNOSIS: Acute renal failure (NURSING)  OUTPATIENT/OBSERVATION GOALS TO BE MET BEFORE DISCHARGE:  ADLs back to baseline: Yes    Activity and level of assistance: Ambulating independently.    Pain status: Pain free.    Return to near baseline physical activity: Yes     Discharge Planner Nurse   Safe discharge environment identified: Yes  Barriers to discharge: No       Entered by: Cynthia Zamudio RN 12/04/2024 8:53 AM     Please review provider order for any additional goals.   Nurse to notify provider when observation goals have been met and patient is ready for discharge.    Patient A/O x4. On RA. Denies pain. Lets needs known. VSS. Call light with in reach.

## 2024-12-04 NOTE — PROGRESS NOTES
Discharge instructions reviewed with patient thoroughly. Patient reported understanding of discharge instructions. VSS. IV removed.

## 2024-12-04 NOTE — DISCHARGE SUMMARY
[facility] MEDICINE  DISCHARGE SUMMARY     Primary Care Physician: Sherry Jenkins  Admission Date: 12/3/2024   Discharge Provider: Cori Cervantes MD Discharge Date: 12/4/2024   Diet:   Diabetic diet and low salt diet   Code Status: Full Code   Activity: DCACTIVITY: Activity as tolerated        Condition at Discharge: Stable     REASON FOR PRESENTATION(See Admission Note for Details)   Worsening renal function    PRINCIPAL & ACTIVE DISCHARGE DIAGNOSES     Acute kidney injury, resolved  Moderate dehydration  Essential hypertension  Prediabetes  Primary hyperaldosteronism  2.1 cm right adrenal nodule  Chronic peripheral neuropathy    PENDING LABS     Unresulted Labs Ordered in the Past 30 Days of this Admission       No orders found for last 31 day(s).             PROCEDURES ( this hospitalization only)      None    RECOMMENDATIONS TO OUTPATIENT PROVIDER FOR F/U VISIT     Follow-up Appointments       Follow-up and recommended labs and tests       Follow-up with endocrinology Dr. Melgar today at noon                DISPOSITION     Home    SUMMARY OF HOSPITAL COURSE:      Ms.Judith GABRIELE Sepulveda is a 59 year old female with history of hypertension, hyperaldosteronism, known adrenal adenoma, hypokalemia, started on spironolactone 100 mg daily, dose recently increased to 200 mg daily 1 week ago.  Outpatient lab revealed creatinine 1.7.  Came to the emergency department for further evaluation.  Received IV hydration.  Creatinine improved to 1.09.  Patient was voiding urine well.  Blood pressure is stable with Norvasc 5 mg daily.  Seeing endocrinology as outpatient.  Will follow-up with endocrinology at outpatient clinic  today around noon.  Patient is discharging home.  Encouraged to keep appointment for further evaluation and treatment of hyperaldosteronism with endocrinology.  Metformin is on hold for 3 days for recent acute kidney injury.    Medically Ready for Discharge: Ready  Now    Discharge Medications with Med changes:     Current Discharge Medication List        CONTINUE these medications which have CHANGED    Details   hydrOXYzine makeda (VISTARIL) 25 MG capsule Take 1 capsule (25 mg) by mouth nightly as needed.    Associated Diagnoses: Anxiety      metFORMIN (GLUCOPHAGE XR) 500 MG 24 hr tablet Hold for 3 day    Associated Diagnoses: Prediabetes      spironolactone (ALDACTONE) 100 MG tablet 100 mg daily    Associated Diagnoses: High aldosterone (H); Essential hypertension           CONTINUE these medications which have NOT CHANGED    Details   albuterol (PROAIR HFA/PROVENTIL HFA/VENTOLIN HFA) 108 (90 Base) MCG/ACT inhaler Inhale 2 puffs into the lungs every 6 hours as needed for shortness of breath, wheezing or cough.  Qty: 18 g, Refills: 3    Comments: Pharmacy may dispense brand covered by insurance (Proair, or proventil or ventolin or generic albuterol inhaler)  Associated Diagnoses: Wheeze      amLODIPine (NORVASC) 5 MG tablet Take 1 tablet (5 mg) by mouth daily.  Qty: 60 tablet, Refills: 0    Associated Diagnoses: High aldosterone (H); Adrenal nodule (H)      gabapentin (NEURONTIN) 300 MG capsule Take 1 capsule (300 mg) by mouth 3 times daily.  Qty: 270 capsule, Refills: 1    Associated Diagnoses: Neuropathy      VITAMIN D3 25 MCG tablet Take 1 tablet by mouth daily                   Rationale for medication changes:      Spironolactone dose decreased to 100 mg daily  Metformin is on hold for 3-day        Consults   None      Immunizations given this encounter     Most Recent Immunizations   Administered Date(s) Administered    COVID-19 12+ (Pfizer) 10/23/2024    COVID-19 Bivalent 12+ (Pfizer) 11/09/2022    COVID-19 MONOVALENT 12+ (Pfizer) 12/16/2021    Influenza Vaccine >6 months,quad, PF 10/25/2023    Influenza Vaccine Trivalent (FluBlok) 10/23/2024    Influenza,INJ,MDCK,PF,Quad >6mo(Flucelvax) 10/21/2021    TDAP Vaccine (Boostrix) 10/03/2020    Zoster recombinant adjuvanted  (SHINGRIX) 10/23/2024           Anticoagulation Information      None      SIGNIFICANT IMAGING FINDINGS     RENAL CT 10/30  1.  Bosniak 1 and 2 bilateral renal cysts.  2.  2.1 cm right adrenal adenoma.  3.  Mosaic attenuation in the lung bases likely due to small airway disease.          SIGNIFICANT LABORATORY FINDINGS     Creatinine 1.73--> 1.09  GFR 51  Sodium 139, potassium 4  UA is unremarkable    Discharge Orders        Reason for your hospital stay    Acute kidney injury     Follow-up and recommended labs and tests     Follow-up with endocrinology Dr. Melgar today at noon     Activity    Your activity upon discharge: activity as tolerated     Diet    Follow this diet upon discharge: low salt diet       Examination   Physical Exam   Temp:  [97.4  F (36.3  C)-98.3  F (36.8  C)] 98.3  F (36.8  C)  Pulse:  [55-73] 59  Resp:  [17-20] 18  BP: (144-162)/(65-83) 144/65  SpO2:  [96 %-100 %] 100 %  Wt Readings from Last 1 Encounters:   12/03/24 70.3 kg (155 lb)     GENERAL:  Alert, appears comfortable, in no acute distress, appears stated age   HEAD:  Normocephalic, without obvious abnormality, atraumatic   EYES:  PERRL, conjunctiva clear,   EOM's intact   NOSE: Nares normal,  mucosa normal, no drainage   THROAT: Lips, mucosa,  gums normal, mouth moist   NECK: Supple, symmetrical, trachea midline   BACK:   Symmetric, no curvature, ROM normal   LUNGS:   Clear to auscultation bilaterally, no rales, rhonchi, or wheezing, symmetric chest rise on inhalation, respirations unlabored   CHEST WALL:  No tenderness or deformity   HEART:  Regular rate and rhythm, S1 and S2 normal, no murmur, rub, or gallop    ABDOMEN:   Soft, non-tender, bowel sounds active , no masses, no organomegaly, no rebound or guarding   EXTREMITIES: No BLE edema    SKIN: No rashes in the visualized areas   NEURO: Alert, oriented x 4, moves all four extremities freely/spontaneously   PSYCH: Cooperative, behavior is appropriate          Please see EMR for  more detailed significant labs, imaging, consultant notes etc.    ICori MD, personally saw the patient today and spent greater than 30 minutes discharging this patient.    Cori Cervantes MD  [facility]    CC:Sherry Jenkins

## 2024-12-04 NOTE — PROGRESS NOTES
"Report received from ED RN , introduced self to patient, appears A&O X4, is vitally stable on room air. Initial assessment done ( See flow sheets). Bed locked in low position, white board updated.  Nursing patient education done on use of call light. Call light within reach, patient acknowledges understanding.      00:02  Blood pressure (!) 152/65, pulse 59, temperature 97.8  F (36.6  C), temperature source Oral, resp. rate 20, height 1.549 m (5' 1\"), weight 70.3 kg (155 lb), SpO2 96%.     Patient  ambulated to the bathroom independently. Vitals updated, assessment done.    02:40  Patient ambulated to the bathroom, continuous IVF verified flowing at 100 ml/hr.      04:13  Patient rounds done, vitals taken   Blood pressure (!) 146/83, pulse 55, temperature 98  F (36.7  C), temperature source Oral, resp. rate 17, height 1.549 m (5' 1\"), weight 70.3 kg (155 lb), SpO2 97%.   Patient is A&O X4, has been hypertensive throughout the shift within MD permissive limits , other VSS  on RA,  afebrile, non-toxic appearing and is interactive. Denies pain, SOB, nausea, chest tightness, dizziness or GI symptoms. Cranial nerves II and XII intact, reflex symmetric, sensation normal, gait normal.Lips and mucous membranes are dry, PERRLA good, conjugate gaze, nares are patent.    SKIN: Intact, no rashes, bruises on LE, no lesions, no erythema.  Calls appropriately, ambulates standby to the bathroom.     05:42  Patient's  Maurice Ruano called to ask for an update. Writer confirmed with patient if information can be shared to which she consented.   Patient then ambulated to the bathroom.  will call later for an update after morning labs.    [id number] hrs   DANAY Villa   "

## 2024-12-04 NOTE — LETTER
"December 4, 2024      Geovanna Sepulveda  [address]  [place] [address]        To Whom It May Concern,     I hope this message finds you well. I am writing to inform you that, due to unforeseen circumstances, I recommend my patient \"Geovanna Sepulveda, date of birth 1965\" to delay her planned travel that was originally scheduled for 12/5/2024.    The reason for the delay is acute kidney injury, and the need to close monitor blood pressure, kidney function and potassium level.     Please let me know if there is any questions or concerns..    Thank you for your attention to this request.    Warm regards,          Sincerely,        Antolin Millan MD          "

## 2024-12-04 NOTE — TELEPHONE ENCOUNTER
[facility] Call Center    Phone Message    May a detailed message be left on voicemail: yes     Reason for Call: Patient would like a call back from Dr Millan. She needs advice since she is in the hospital. Please call her back.    She will also need a call back to reschedule her appt with  for return Endo.     Action Taken: Message routed to:  [facility] ([facility] REYNALDO

## 2024-12-04 NOTE — PROGRESS NOTES
"PRIMARY DIAGNOSIS: \"GENERIC\" NURSING  OUTPATIENT/OBSERVATION GOALS TO BE MET BEFORE DISCHARGE:  ADLs back to baseline: Yes    Activity and level of assistance: Ambulating independently.    Pain status: Pain free.    Return to near baseline physical activity: Yes     Discharge Planner Nurse   Safe discharge environment identified: Yes  Barriers to discharge:  Awaiting morning labs       Entered by: Allen Baker RN 12/04/2024 12:48 AM     Please review provider order for any additional goals.   Nurse to notify provider when observation goals have been met and patient is ready for discharge.  "

## 2024-12-04 NOTE — ED NOTES
Ambulated patient to restroom, obtained a UA. Vitals updated, Ice water provided, no other needs at this time.

## 2024-12-04 NOTE — H&P
[facility]    History and Physical - Hospitalist Service       Date of Admission:  12/3/2024    Assessment & Plan      Geovanna Sepulveda is a 59 year old female admitted on 12/3/2024. She has medical history significant for hyperaldosteronism, hypertension, chronic neuropathic pain, prediabetes, renal cysts, and hypokalemia.  She presented to the ED for abnormal labs.  She had recalled that she has underlying Metabolic acidosis.  However, presentation, anion gap is normal.  Bicarb is also normal.  Her creatinine on 11/30/2024 was 0.94.  Creatinine today has increased to 1.73.    # TRAM: Creatinine of 1.73, increased from baseline of 0.6 to 0.9.  IV fluids  Hold Aldactone  Reports she has been off losartan.   Monitor renal function  Avoid nephrotoxins  Renal dosing of medications  Check UA, urine sodium, creatinine, and     # Hypertension: Blood pressure is elevated  Hold Aldactone  As needed hydralazine    # Prediabetes:   Hold metformin   Correctional scale insulin  Hypoglycemia protocol.     # Chronic neuropathic pain:  Continue gabapentin    # Hyperaldosteronism  Hold Aldactone for now may need to resume at low-dose and titrate slowly.     Observation Goals: -diagnostic tests and consults completed and resulted, -vital signs normal or at patient baseline, Nurse to notify provider when observation goals have been met and patient is ready for discharge.  Diet: Moderate Consistent Carb (60 g CHO per Meal) Diet    DVT Prophylaxis: Enoxaparin (Lovenox) SQ  Pearson Catheter: Not present  Lines: None     Cardiac Monitoring: None  Code Status: Full Code      Clinically Significant Risk Factors Present on Admission                  # Acute Kidney Injury, unspecified: based on a >150% or 0.3 mg/dL increase in last creatinine compared to past 90 day average, will monitor renal function  # Hypertension: Noted on problem list           # Overweight: Estimated body mass index is 29.29 kg/m  as calculated  "from the following:    Height as of this encounter: 1.549 m (5' 1\").    Weight as of this encounter: 70.3 kg (155 lb).              Disposition Plan     Medically Ready for Discharge: Anticipated in 2-4 Days           Markus Ortiz MD  Hospitalist Service  [facility]  Securely message with [email or web address] (more info)  Text page via [facility] Paging/Directory     ______________________________________________________________________    Chief Complaint   Abnormal labs.     History is obtained from the patient    History of Present Illness   Geovanna Sepulveda is a 59 year old female with medical history significant for hyperaldosteronism, hypertension, chronic neuropathic pain, prediabetes, renal cysts, and hypokalemia.  She presented to the ED for abnormal labs.  She had recalled that she has underlying Metabolic acidosis.  However, presentation, anion gap is normal.  Bicarb is also normal.  Her creatinine on 11/30/2024 was 0.94.  Creatinine today has increased to 1.73. She denies any new symptoms. She denies any fevers, chills, chest pain, shortness of breath, n/v/d/c, dysuria, hematuria, or any other new symptoms. She denies any NSAID use. Only takes OTC acetaminophen occasionally.     She reports that her aldactone dose was doubled by her endocrinologist recently.       Past Medical History    Past Medical History:   Diagnosis Date    Hypertension 2012       Past Surgical History   Past Surgical History:   Procedure Laterality Date    CARPAL TUNNEL RELEASE RT/LT Bilateral 12/2020    [facility]       Prior to Admission Medications   Prior to Admission Medications   Prescriptions Last Dose Informant Patient Reported? Taking?   VITAMIN D3 25 MCG tablet   Yes No   Sig: Take 1 tablet by mouth daily   albuterol (PROAIR HFA/PROVENTIL HFA/VENTOLIN HFA) 108 (90 Base) MCG/ACT inhaler   No No   Sig: Inhale 2 puffs into the lungs every 6 hours as needed for shortness of breath, wheezing or cough.   amLODIPine " (NORVASC) 5 MG tablet   No No   Sig: Take 1 tablet (5 mg) by mouth daily.   atorvastatin (LIPITOR) 20 MG tablet   No No   Sig: Take 1 tablet (20 mg) by mouth daily   dexAMETHasone (DECADRON) 4 MG tablet   No No   Sig: Take 2 tablets (8 mg) by mouth once for 1 dose. Take between 11 pm and midnight, then get 8 am lab the next morning.   gabapentin (NEURONTIN) 300 MG capsule   No No   Sig: Take 1 capsule (300 mg) by mouth 3 times daily.   hydrOXYzine makeda (VISTARIL) 25 MG capsule   No No   Sig: Take 1 capsule (25 mg) by mouth at bedtime   losartan (COZAAR) 50 MG tablet   Yes No   Sig: Take 25 mg by mouth daily.   metFORMIN (GLUCOPHAGE XR) 500 MG 24 hr tablet   No No   Sig: Take 1 tablet (500 mg) by mouth daily (with dinner).   spironolactone (ALDACTONE) 100 MG tablet   No No   Sig: Take 1 tablet (100 mg) by mouth daily.      Facility-Administered Medications Last Administration Doses Remaining   dexAMETHasone (DECADRON) tablet 8 mg None recorded 1           Review of Systems    As per HPI otherwise 14 point review of systems is negative.     Physical Exam   Vital Signs: Temp: 97.4  F (36.3  C) Temp src: Oral BP: (!) 162/74 Pulse: 73   Resp: 18 SpO2: 96 % O2 Device: None (Room air)    Weight: 155 lbs 0 oz    General: AAOx3, NAD, pleasant.  HEENT: NCAT, pupils are equal and round, anicteric, oral mucosa is moist.  Neck: Supple,   Cardiac: RRR.  No murmur. No rub or gallop.  Respiratory: CTAB, no crackles, wheezes, rales, or rhonchi  Abdomen: Soft, NT, ND, + bowel sounds  Extremity: No LE edema, distal pulses palpable.   Neuro: AAO x3.  Psych: Calm and cooperative.       Medical Decision Making       More than 60  MINUTES SPENT BY ME on the date of service doing chart review, history, exam, documentation & further activities per the note.      Data     I have personally reviewed the following data over the past 24 hrs:    N/A  \   N/A   / N/A     137 103 44.5 (H) /  103 (H)   4.2 22 1.73 (H) \     ALT: 20 AST: 25 AP: 72  TBILI: 0.5   ALB: 4.6 TOT PROTEIN: 7.9 LIPASE: N/A       Imaging results reviewed over the past 24 hrs:   No results found for this or any previous visit (from the past 24 hours).

## 2025-01-08 ENCOUNTER — LAB (OUTPATIENT)
Dept: LAB | Facility: CLINIC | Age: 60
End: 2025-01-08
Payer: COMMERCIAL

## 2025-01-08 ENCOUNTER — MYC REFILL (OUTPATIENT)
Dept: FAMILY MEDICINE | Facility: CLINIC | Age: 60
End: 2025-01-08

## 2025-01-08 DIAGNOSIS — E27.9 ADRENAL NODULE: ICD-10-CM

## 2025-01-08 DIAGNOSIS — E26.9 HIGH ALDOSTERONE: ICD-10-CM

## 2025-01-08 DIAGNOSIS — R73.03 PREDIABETES: ICD-10-CM

## 2025-01-08 LAB
ANION GAP SERPL CALCULATED.3IONS-SCNC: 14 MMOL/L (ref 7–15)
BUN SERPL-MCNC: 26.6 MG/DL (ref 6–20)
CALCIUM SERPL-MCNC: 10 MG/DL (ref 8.8–10.4)
CHLORIDE SERPL-SCNC: 102 MMOL/L (ref 98–107)
CREAT SERPL-MCNC: 1.06 MG/DL (ref 0.51–0.95)
EGFRCR SERPLBLD CKD-EPI 2021: 61 ML/MIN/1.73M2
GLUCOSE SERPL-MCNC: 91 MG/DL (ref 70–99)
HCO3 SERPL-SCNC: 23 MMOL/L (ref 22–29)
POTASSIUM SERPL-SCNC: 3.9 MMOL/L (ref 3.4–5.3)
SODIUM SERPL-SCNC: 139 MMOL/L (ref 135–145)

## 2025-01-08 PROCEDURE — 80048 BASIC METABOLIC PNL TOTAL CA: CPT

## 2025-01-08 PROCEDURE — 84244 ASSAY OF RENIN: CPT

## 2025-01-08 PROCEDURE — 82088 ASSAY OF ALDOSTERONE: CPT

## 2025-01-08 PROCEDURE — 36415 COLL VENOUS BLD VENIPUNCTURE: CPT

## 2025-01-08 RX ORDER — METFORMIN HYDROCHLORIDE 500 MG/1
TABLET, EXTENDED RELEASE ORAL
Status: CANCELLED | OUTPATIENT
Start: 2025-01-08

## 2025-01-10 LAB
ALDOST SERPL-MCNC: 70.8 NG/DL (ref 0–31)
ALDOST/RENIN PLAS-RTO: 78.7 {RATIO} (ref 0–25)
RENIN PLAS-CCNC: 0.9 NG/ML/HR

## 2025-01-11 ENCOUNTER — NURSE TRIAGE (OUTPATIENT)
Dept: NURSING | Facility: CLINIC | Age: 60
End: 2025-01-11
Payer: COMMERCIAL

## 2025-01-11 NOTE — TELEPHONE ENCOUNTER
Pt calling wanting to know if she should still be taking Metformin.  Pt was last seen in clinic 12/4/24 and per MD note pt is to hold Metformin for now.  Pt has a clinic appointment scheduled for 1/15/25 and will discuss continuation of Metformin with MD.    Fatemeh Noyola RN  [facility] Nurse Advisor    Reason for Disposition   Caller has medicine question only, adult not sick, AND triager answers question    Additional Information   Negative: [1] Intentional drug overdose AND [2] suicidal thoughts or ideas   Negative: Drug overdose and triager unable to answer question   Negative: Caller requesting a renewal or refill of a medicine patient is currently taking   Negative: Caller requesting information unrelated to medicine   Negative: Caller requesting information about COVID-19 Vaccine   Negative: Caller requesting information about Emergency Contraception   Negative: Caller requesting information about Combined Birth Control Pills   Negative: Caller requesting information about Progestin Birth Control Pills   Negative: Caller requesting information about Post-Op pain or medicines   Negative: Caller requesting a prescription antibiotic (such as Penicillin) for Strep throat and has a positive culture result   Negative: Caller requesting a prescription anti-viral med (such as Tamiflu) and has influenza (flu) symptoms   Negative: Immunization reaction suspected   Negative: Rash while taking a medicine or within 3 days of stopping it   Negative: [1] Asthma and [2] having symptoms of asthma (cough, wheezing, etc.)   Negative: [1] Symptom of illness (e.g., headache, abdominal pain, earache, vomiting) AND [2] more than mild   Negative: Breastfeeding questions about mother's medicines and diet   Negative: MORE THAN A DOUBLE DOSE of a prescription or over-the-counter (OTC) drug   Negative: [1] DOUBLE DOSE (an extra dose or lesser amount) of prescription drug AND [2] any symptoms (e.g., dizziness, nausea, pain, sleepiness)    Negative: [1] DOUBLE DOSE (an extra dose or lesser amount) of over-the-counter (OTC) drug AND [2] any symptoms (e.g., dizziness, nausea, pain, sleepiness)   Negative: Took another person's prescription drug   Negative: [1] DOUBLE DOSE (an extra dose or lesser amount) of prescription drug AND [2] NO symptoms  (Exception: A double dose of antibiotics.)   Negative: Diabetes drug error or overdose (e.g., took wrong type of insulin or took extra dose)   Negative: [1] Prescription not at pharmacy AND [2] was prescribed by PCP recently (Exception: Triager has access to EMR and prescription is recorded there. Go to Home Care and confirm for pharmacy.)   Negative: [1] Pharmacy calling with prescription question AND [2] triager unable to answer question   Negative: [1] Caller has URGENT medicine question about med that PCP or specialist prescribed AND [2] triager unable to answer question   Negative: Medicine patch causing local rash or itching   Negative: [1] Caller has medicine question about med NOT prescribed by PCP AND [2] triager unable to answer question (e.g., compatibility with other med, storage)   Negative: Prescription request for new medicine (not a refill)   Negative: [1] Caller has NON-URGENT medicine question about med that PCP prescribed AND [2] triager unable to answer question   Negative: Caller wants to use a complementary or alternative medicine   Negative: [1] Prescription prescribed recently is not at pharmacy AND [2] triager has access to patient's EMR AND [3] prescription is recorded in the EMR   Negative: [1] DOUBLE DOSE (an extra dose or lesser amount) of over-the-counter (OTC) drug AND [2] NO symptoms   Negative: [1] DOUBLE DOSE (an extra dose or lesser amount) of antibiotic drug AND [2] NO symptoms    Protocols used: Medication Question Call-A-

## 2025-01-13 RX ORDER — METFORMIN HYDROCHLORIDE 500 MG/1
500 TABLET, EXTENDED RELEASE ORAL
Qty: 90 TABLET | Refills: 4 | Status: SHIPPED | OUTPATIENT
Start: 2025-01-13 | End: 2025-01-15

## 2025-01-14 NOTE — PROGRESS NOTES
Endocrinology Clinic Visit       Geovanna Sepulveda is a 59 year old female with HTN who is here for Follow-up hyperaldosteronism .    Interval History  Pt went back from the [facility]  Pt stopped drinking soda  Frequent urination, no pain. Pt thirsty and drinks alot  Mostly green and salad  No SE on amlodipine    Initial visit  Pt started to have high BP 2010,   Pt saw primary Sherry CAMPOVERDE CNP, secondary causes of hypertension were checked and found to have primary hyperaldosteronism.    Pt has been off amlodipine and potassium for 1 month  Losartan decrease 0.5 tablet and increase spironolactone to 2 tablets 1 week ago    Dad and sisters with high BP  No FH of stroke/ heart attack  Mom with ovarian cancer  Sister with ovarian mass  Another sister with heart stent 2/2 blockage    [place] moved to the [place]    Pt works in a factory   with stroke and passed away 2014.       REVIEW OF SYSTEMS  10 point negative except as mentioned in HPI    Past Medical/Surgical History:  Past Medical History:   Diagnosis Date    Hypertension 2012     Past Surgical History:   Procedure Laterality Date    CARPAL TUNNEL RELEASE RT/LT Bilateral 12/2020    [facility]       Medications  Current Outpatient Medications   Medication Sig Dispense Refill    albuterol (PROAIR HFA/PROVENTIL HFA/VENTOLIN HFA) 108 (90 Base) MCG/ACT inhaler Inhale 2 puffs into the lungs every 6 hours as needed for shortness of breath, wheezing or cough. 18 g 3    amLODIPine (NORVASC) 10 MG tablet Take 1 tablet (10 mg) by mouth daily. 90 tablet 0    gabapentin (NEURONTIN) 300 MG capsule Take 1 capsule (300 mg) by mouth 3 times daily. 270 capsule 1    hydrOXYzine makeda (VISTARIL) 25 MG capsule Take 1 capsule (25 mg) by mouth nightly as needed.      spironolactone (ALDACTONE) 25 MG tablet Take 1 tablet (25 mg) by mouth daily. Take with the 50 mg tablet for a total of 75 mg/day 60 tablet 0    spironolactone (ALDACTONE) 50 MG tablet Take 1 tablet (50 mg)  by mouth daily. Take with the 25 mg tablet for a total of 75 mg/day 90 tablet 0    VITAMIN D3 25 MCG tablet Take 1 tablet by mouth daily       No current facility-administered medications for this visit.       Allergies  Allergies   Allergen Reactions    Other Environmental Allergy Itching    Sulfamethoxazole-Trimethoprim Rash         Family History  family history includes Breast Cancer in her maternal aunt; Diabetes Type 2  in her sister; Hyperlipidemia in her father; Hypertension in her father; Other - See Comments in her sister; Ovarian Cancer (age of onset: 60) in her mother.    Social History  Social History     Tobacco Use    Smoking status: Never     Passive exposure: Never    Smokeless tobacco: Never   Substance Use Topics    Alcohol use: Never       Physical Exam  BP (!) 146/76 (BP Location: Left arm)   Pulse 70   Wt 68.1 kg (150 lb 1.6 oz)   SpO2 97%   BMI 28.36 kg/m    Body mass index is 28.36 kg/m .  GENERAL :  In no apparent distress  EYES: No scleral icterus,  No proptosis  NECK: No visible masses.   RESP: Normal breathing  NEURO: awake, alert, responds appropriately to questions.    Ext; No swelling    DATA REVIEW  Labs/Imaging    Lab Results   Component Value Date    A1C 6.1 10/23/2024    A1C 6.2 05/08/2024    A1C 6.1 10/25/2023    A1C 5.8 11/26/2021          Last Comprehensive Metabolic Panel:  Lab Results   Component Value Date     01/08/2025    POTASSIUM 3.9 01/08/2025    CHLORIDE 102 01/08/2025    CO2 23 01/08/2025    ANIONGAP 14 01/08/2025    GLC 91 01/08/2025    BUN 26.6 (H) 01/08/2025    CR 1.06 (H) 01/08/2025    GFRESTIMATED 61 01/08/2025    MIRNA 10.0 01/08/2025         ASSESSMENT/PLAN:     Geovanna Sepulveda is a 59 year old female with primary hyperaldosteronism and recent TRAM who is here to follow up discuss blood pressure medication.    ## 2.1 cm right adrenal nodule (?HU)  ## Primary hyperaldosteronism  ## MACE (+ve 1 mg and 8 mg DST)   ## New frequent urination  Pt with  spontaneous hypokalemia and labs compatible with primary hyperaldosteronism, no need for confirmatory test  Pt still prefers surgery for treatment of her primary hyperaldosteronism.  BP improve  Renin 0.9  Spironolactone can interfere AVS result, to ensure accurate result, will decrease spironolactone from 100 mg daily to 75 mg daily    We discussed about MACE, patient may need hydrocortisone after adrenal surgery    -- decrease spironolactone from 100 mg daily to 75 mg daily  -- increase amlodipine from 5 mg to 10 mg daily, let me know if swelling or persistent BP >140/90  -- labs 1 week  -- urine exam today  -- Interventional Radiologist referral for AVS  -- UA    Follow-up 4-6 weeks       Orders Placed This Encounter   Procedures    IR Referral    Basic metabolic panel    Aldosterone    Renin activity    Glucose    Hemoglobin A1c    UA Macroscopic with reflex to Microscopic and Culture - Lab Collect    Creatinine timed urine    Vitamin D Deficiency    Aldosterone Renin Ratio     The longitudinal plan of care for the diagnosis(es)/condition(s) as documented were addressed during this visit. Due to the added complexity in care, I will continue to support Geovanna in the subsequent management and with ongoing continuity of care.       Antolin Millan MD

## 2025-01-15 ENCOUNTER — LAB (OUTPATIENT)
Dept: LAB | Facility: CLINIC | Age: 60
End: 2025-01-15
Payer: COMMERCIAL

## 2025-01-15 ENCOUNTER — OFFICE VISIT (OUTPATIENT)
Dept: ENDOCRINOLOGY | Facility: CLINIC | Age: 60
End: 2025-01-15
Payer: COMMERCIAL

## 2025-01-15 VITALS
DIASTOLIC BLOOD PRESSURE: 76 MMHG | HEART RATE: 70 BPM | OXYGEN SATURATION: 97 % | SYSTOLIC BLOOD PRESSURE: 146 MMHG | WEIGHT: 150.1 LBS | BODY MASS INDEX: 28.36 KG/M2

## 2025-01-15 DIAGNOSIS — R79.89 LOW VITAMIN D LEVEL: ICD-10-CM

## 2025-01-15 DIAGNOSIS — E26.9 HIGH ALDOSTERONE: Primary | ICD-10-CM

## 2025-01-15 DIAGNOSIS — R35.0 FREQUENT URINATION: ICD-10-CM

## 2025-01-15 LAB
ALBUMIN UR-MCNC: NEGATIVE MG/DL
APPEARANCE UR: CLEAR
BILIRUB UR QL STRIP: NEGATIVE
COLOR UR AUTO: COLORLESS
GLUCOSE UR STRIP-MCNC: NEGATIVE MG/DL
HGB UR QL STRIP: NEGATIVE
KETONES UR STRIP-MCNC: NEGATIVE MG/DL
LEUKOCYTE ESTERASE UR QL STRIP: NEGATIVE
NITRATE UR QL: NEGATIVE
PH UR STRIP: 6 [PH] (ref 5–7)
SP GR UR STRIP: 1.01 (ref 1–1.03)
UROBILINOGEN UR STRIP-MCNC: <2 MG/DL

## 2025-01-15 PROCEDURE — 81003 URINALYSIS AUTO W/O SCOPE: CPT

## 2025-01-15 RX ORDER — AMLODIPINE BESYLATE 10 MG/1
10 TABLET ORAL DAILY
Qty: 90 TABLET | Refills: 0 | Status: SHIPPED | OUTPATIENT
Start: 2025-01-15 | End: 2025-04-15

## 2025-01-15 RX ORDER — SPIRONOLACTONE 50 MG/1
50 TABLET, FILM COATED ORAL DAILY
Qty: 90 TABLET | Refills: 0 | Status: SHIPPED | OUTPATIENT
Start: 2025-01-15 | End: 2025-04-15

## 2025-01-15 RX ORDER — SPIRONOLACTONE 25 MG/1
25 TABLET ORAL DAILY
Qty: 60 TABLET | Refills: 0 | Status: SHIPPED | OUTPATIENT
Start: 2025-01-15 | End: 2025-03-16

## 2025-01-15 NOTE — LETTER
1/15/2025      Geovanna Sepulveda  [address]  [place] [address]      Dear Colleague,    Thank you for referring your patient, Geovanna Sepulveda, to the [facility] [place]. Please see a copy of my visit note below.    Endocrinology Clinic Visit       Geovanna Sepulveda is a 59 year old female with HTN who is here for Follow-up hyperaldosteronism .    Interval History  Pt went back from the [facility]  Pt stopped drinking soda  Frequent urination, no pain. Pt thirsty and drinks alot  Mostly green and salad  No SE on amlodipine    Initial visit  Pt started to have high BP 2010,   Pt saw primary Sherry Brookstexbrittanie GILLES CNP, secondary causes of hypertension were checked and found to have primary hyperaldosteronism.    Pt has been off amlodipine and potassium for 1 month  Losartan decrease 0.5 tablet and increase spironolactone to 2 tablets 1 week ago    Dad and sisters with high BP  No FH of stroke/ heart attack  Mom with ovarian cancer  Sister with ovarian mass  Another sister with heart stent 2/2 blockage    [place]    Pt works in a factory   with stroke and passed away 2014.       REVIEW OF SYSTEMS  10 point negative except as mentioned in HPI    Past Medical/Surgical History:  Past Medical History:   Diagnosis Date     Hypertension 2012     Past Surgical History:   Procedure Laterality Date     CARPAL TUNNEL RELEASE RT/LT Bilateral 12/2020    [facility]       Medications  Current Outpatient Medications   Medication Sig Dispense Refill     albuterol (PROAIR HFA/PROVENTIL HFA/VENTOLIN HFA) 108 (90 Base) MCG/ACT inhaler Inhale 2 puffs into the lungs every 6 hours as needed for shortness of breath, wheezing or cough. 18 g 3     amLODIPine (NORVASC) 10 MG tablet Take 1 tablet (10 mg) by mouth daily. 90 tablet 0     gabapentin (NEURONTIN) 300 MG capsule Take 1 capsule (300 mg) by mouth 3 times daily. 270 capsule 1     hydrOXYzine makeda (VISTARIL) 25 MG capsule Take 1  capsule (25 mg) by mouth nightly as needed.       spironolactone (ALDACTONE) 25 MG tablet Take 1 tablet (25 mg) by mouth daily. Take with the 50 mg tablet for a total of 75 mg/day 60 tablet 0     spironolactone (ALDACTONE) 50 MG tablet Take 1 tablet (50 mg) by mouth daily. Take with the 25 mg tablet for a total of 75 mg/day 90 tablet 0     VITAMIN D3 25 MCG tablet Take 1 tablet by mouth daily       No current facility-administered medications for this visit.       Allergies  Allergies   Allergen Reactions     Other Environmental Allergy Itching     Sulfamethoxazole-Trimethoprim Rash         Family History  family history includes Breast Cancer in her maternal aunt; Diabetes Type 2  in her sister; Hyperlipidemia in her father; Hypertension in her father; Other - See Comments in her sister; Ovarian Cancer (age of onset: 60) in her mother.    Social History  Social History     Tobacco Use     Smoking status: Never     Passive exposure: Never     Smokeless tobacco: Never   Substance Use Topics     Alcohol use: Never       Physical Exam  BP (!) 146/76 (BP Location: Left arm)   Pulse 70   Wt 68.1 kg (150 lb 1.6 oz)   SpO2 97%   BMI 28.36 kg/m    Body mass index is 28.36 kg/m .  GENERAL :  In no apparent distress  EYES: No scleral icterus,  No proptosis  NECK: No visible masses.   RESP: Normal breathing  NEURO: awake, alert, responds appropriately to questions.    Ext; No swelling    DATA REVIEW  Labs/Imaging    Lab Results   Component Value Date    A1C 6.1 10/23/2024    A1C 6.2 05/08/2024    A1C 6.1 10/25/2023    A1C 5.8 11/26/2021          Last Comprehensive Metabolic Panel:  Lab Results   Component Value Date     01/08/2025    POTASSIUM 3.9 01/08/2025    CHLORIDE 102 01/08/2025    CO2 23 01/08/2025    ANIONGAP 14 01/08/2025    GLC 91 01/08/2025    BUN 26.6 (H) 01/08/2025    CR 1.06 (H) 01/08/2025    GFRESTIMATED 61 01/08/2025    MIRNA 10.0 01/08/2025         ASSESSMENT/PLAN:     Geovanna Sepulveda is a 59 year old  female with primary hyperaldosteronism and recent TRAM who is here to follow up discuss blood pressure medication.    ## 2.1 cm right adrenal nodule (?HU)  ## Primary hyperaldosteronism  ## MACE (+ve 1 mg and 8 mg DST)   ## New frequent urination  Pt with spontaneous hypokalemia and labs compatible with primary hyperaldosteronism, no need for confirmatory test  Pt still prefers surgery for treatment of her primary hyperaldosteronism.  BP improve  Renin 0.9  Spironolactone can interfere AVS result, to ensure accurate result, will decrease spironolactone from 100 mg daily to 75 mg daily    We discussed about MACE, patient may need hydrocortisone after adrenal surgery    -- decrease spironolactone from 100 mg daily to 75 mg daily  -- increase amlodipine from 5 mg to 10 mg daily, let me know if swelling or persistent BP >140/90  -- labs 1 week  -- urine exam today  -- Interventional Radiologist referral for AVS  -- UA    Follow-up 4-6 weeks       Orders Placed This Encounter   Procedures     IR Referral     Basic metabolic panel     Aldosterone     Renin activity     Glucose     Hemoglobin A1c     UA Macroscopic with reflex to Microscopic and Culture - Lab Collect     Creatinine timed urine     Vitamin D Deficiency     Aldosterone Renin Ratio     The longitudinal plan of care for the diagnosis(es)/condition(s) as documented were addressed during this visit. Due to the added complexity in care, I will continue to support Geovanna in the subsequent management and with ongoing continuity of care.       Antolin Millan MD        Again, thank you for allowing me to participate in the care of your patient.        Sincerely,        Antolin Millan MD    Electronically signed

## 2025-01-15 NOTE — PATIENT INSTRUCTIONS
## 2.1 cm right adrenal nodule   ## Primary hyperaldosteronism and mild autonomous cortisol excess  BP improve  Renin 0.9  Spironolactone can interfere AVS result, to ensure accurate result, will decrease spironolactone from 100 mg daily to 75 mg daily  -- decrease spironolactone from 100 mg daily to 75 mg daily  -- increase amlodipine from 5 mg to 10 mg daily, let me know if swelling or persistent BP >140/90  -- labs 1 week  -- urine exam today  -- Interventional Radiologist referral for AVS    Follow-up 4-6 weeks

## 2025-01-25 ENCOUNTER — LAB (OUTPATIENT)
Dept: LAB | Facility: CLINIC | Age: 60
End: 2025-01-25
Payer: COMMERCIAL

## 2025-01-25 DIAGNOSIS — R79.89 LOW VITAMIN D LEVEL: ICD-10-CM

## 2025-01-25 DIAGNOSIS — R35.0 FREQUENT URINATION: ICD-10-CM

## 2025-01-25 DIAGNOSIS — E26.9 HIGH ALDOSTERONE: Primary | ICD-10-CM

## 2025-01-25 DIAGNOSIS — E26.9 HIGH ALDOSTERONE: ICD-10-CM

## 2025-01-25 LAB
ANION GAP SERPL CALCULATED.3IONS-SCNC: 13 MMOL/L (ref 7–15)
BUN SERPL-MCNC: 40 MG/DL (ref 6–20)
CALCIUM SERPL-MCNC: 9.5 MG/DL (ref 8.8–10.4)
CHLORIDE SERPL-SCNC: 103 MMOL/L (ref 98–107)
CREAT SERPL-MCNC: 1.37 MG/DL (ref 0.51–0.95)
EGFRCR SERPLBLD CKD-EPI 2021: 45 ML/MIN/1.73M2
EST. AVERAGE GLUCOSE BLD GHB EST-MCNC: 126 MG/DL
FASTING STATUS PATIENT QL REPORTED: NO
FASTING STATUS PATIENT QL REPORTED: NO
GLUCOSE SERPL-MCNC: 111 MG/DL (ref 70–99)
GLUCOSE SERPL-MCNC: 111 MG/DL (ref 70–99)
HBA1C MFR BLD: 6 %
HCO3 SERPL-SCNC: 21 MMOL/L (ref 22–29)
POTASSIUM SERPL-SCNC: 4.5 MMOL/L (ref 3.4–5.3)
SODIUM SERPL-SCNC: 137 MMOL/L (ref 135–145)
VIT D+METAB SERPL-MCNC: 32 NG/ML (ref 20–50)

## 2025-01-25 PROCEDURE — 82306 VITAMIN D 25 HYDROXY: CPT

## 2025-01-25 PROCEDURE — 84244 ASSAY OF RENIN: CPT

## 2025-01-25 PROCEDURE — 83036 HEMOGLOBIN GLYCOSYLATED A1C: CPT

## 2025-01-25 PROCEDURE — 80048 BASIC METABOLIC PNL TOTAL CA: CPT

## 2025-01-25 PROCEDURE — 82565 ASSAY OF CREATININE: CPT

## 2025-01-25 PROCEDURE — 36415 COLL VENOUS BLD VENIPUNCTURE: CPT

## 2025-01-25 PROCEDURE — 82088 ASSAY OF ALDOSTERONE: CPT

## 2025-01-25 PROCEDURE — 84132 ASSAY OF SERUM POTASSIUM: CPT

## 2025-01-26 NOTE — RESULT ENCOUNTER NOTE
Hello -    Here are my comments about the recent results: A1c is stable in prediabetes range. Potassium is normal. Kidney function is mildly elevated. Please make sure to drink water at least 8 glasses/day.  Recheck labs in 2-3 days.    Regards,   Antolin Millan MD

## 2025-01-28 LAB
ALDOST SERPL-MCNC: 87.3 NG/DL (ref 0–31)
ALDOST/RENIN PLAS-RTO: 10.2 {RATIO} (ref 0–25)
RENIN PLAS-CCNC: 8.6 NG/ML/HR

## 2025-02-01 ENCOUNTER — LAB (OUTPATIENT)
Dept: LAB | Facility: CLINIC | Age: 60
End: 2025-02-01
Payer: COMMERCIAL

## 2025-02-01 DIAGNOSIS — E26.9 HIGH ALDOSTERONE: ICD-10-CM

## 2025-02-01 LAB
ANION GAP SERPL CALCULATED.3IONS-SCNC: 11 MMOL/L (ref 7–15)
BUN SERPL-MCNC: 39 MG/DL (ref 6–20)
CALCIUM SERPL-MCNC: 9.4 MG/DL (ref 8.8–10.4)
CHLORIDE SERPL-SCNC: 104 MMOL/L (ref 98–107)
CREAT SERPL-MCNC: 1.13 MG/DL (ref 0.51–0.95)
EGFRCR SERPLBLD CKD-EPI 2021: 56 ML/MIN/1.73M2
GLUCOSE SERPL-MCNC: 101 MG/DL (ref 70–99)
HCO3 SERPL-SCNC: 22 MMOL/L (ref 22–29)
POTASSIUM SERPL-SCNC: 4.3 MMOL/L (ref 3.4–5.3)
SODIUM SERPL-SCNC: 137 MMOL/L (ref 135–145)

## 2025-02-01 PROCEDURE — 80048 BASIC METABOLIC PNL TOTAL CA: CPT

## 2025-02-01 PROCEDURE — 82565 ASSAY OF CREATININE: CPT

## 2025-02-01 PROCEDURE — 36415 COLL VENOUS BLD VENIPUNCTURE: CPT

## 2025-02-04 ENCOUNTER — TELEPHONE (OUTPATIENT)
Dept: ENDOCRINOLOGY | Facility: CLINIC | Age: 60
End: 2025-02-04
Payer: COMMERCIAL

## 2025-02-04 NOTE — TELEPHONE ENCOUNTER
[facility] Call Center    Phone Message    May a detailed message be left on voicemail: yes     Reason for Call: Medication Refill Request    Has the patient contacted the pharmacy for the refill? Yes   Name of medication being requested: spironolactone (ALDACTONE)   amLODIPine (NORVASC)   Provider who prescribed the medication:  Antolin Millan MD   Pharmacy:    [id number] [address]  Date medication is needed: 2/4/2025    Action Taken: Other: Endo    Travel Screening: Not Applicable     Date of Service:

## 2025-02-04 NOTE — TELEPHONE ENCOUNTER
LOV 1/15/25:  A/P:  We discussed about MACE, patient may need hydrocortisone after adrenal surgery     -- decrease spironolactone from 100 mg daily to 75 mg daily  -- increase amlodipine from 5 mg to 10 mg daily, let me know if swelling or persistent BP >140/90  -- labs 1 week  -- urine exam today  -- Interventional Radiologist referral for AVS  -- UA     Follow-up 4-6 weeks

## 2025-02-05 NOTE — TELEPHONE ENCOUNTER
Spoke to pt. Pt already called [facility] pharmacy and got the prescriptions. No further questions or concerns at the moment.

## 2025-02-11 ENCOUNTER — TELEPHONE (OUTPATIENT)
Dept: ENDOCRINOLOGY | Facility: CLINIC | Age: 60
End: 2025-02-11
Payer: COMMERCIAL

## 2025-02-11 NOTE — TELEPHONE ENCOUNTER
[facility] Call Center    Phone Message    May a detailed message be left on voicemail: yes     Reason for Call: Medication Question or concern regarding medication   Prescription Clarification  Name of Medication: spironolactone (ALDACTONE) 25 MG tablet [20106]  Prescribing Provider: Monty      What on the order needs clarification? Patient would like a call back to clarify the dosage she should be taking.       Action Taken: Other: endo    Travel Screening: Not Applicable     Date of Service:

## 2025-02-12 NOTE — TELEPHONE ENCOUNTER
[facility]    LOV 1/15/25:  A/P:  We discussed about MACE, patient may need hydrocortisone after adrenal surgery     -- decrease spironolactone from 100 mg daily to 75 mg daily  -- increase amlodipine from 5 mg to 10 mg daily, let me know if swelling or persistent BP >140/90  -- labs 1 week  -- urine exam today  -- Interventional Radiologist referral for AVS  -- UA     Follow-up 4-6 weeks

## 2025-02-12 NOTE — TELEPHONE ENCOUNTER
[facility] Call Center    Phone Message    May a detailed message be left on voicemail: yes     Reason for Call: Patient calling back the nurse, please call her back as she will be waiting for your call.     Action Taken: Message routed to:  [facility] ENDO    Travel Screening: Not Applicable     Date of Service:

## 2025-02-12 NOTE — TELEPHONE ENCOUNTER
Spoke to pt regards called message. Confirm pt to take Spironolactone 25 mg addition to 50 mg to make 75 mg daily. Pt voiced her understanding.

## 2025-02-18 ENCOUNTER — TELEPHONE (OUTPATIENT)
Dept: RADIOLOGY | Facility: CLINIC | Age: 60
End: 2025-02-18
Payer: COMMERCIAL

## 2025-02-18 NOTE — PROGRESS NOTES
Endocrinology Clinic Visit       Geovanna Sepulveda is a 59 year old female with HTN who is here for Follow-up hyperaldosteronism .    Interval History  Pt scheduled to meet with IR 3/2025  No swelling on amlodipine  Stopped soda, eating healthy  Urine is fine    Initial visit  Pt started to have high BP 2010,   Pt saw primary Sherry CAMPOVERDE CNP, secondary causes of hypertension were checked and found to have primary hyperaldosteronism.    Pt has been off amlodipine and potassium for 1 month  Losartan decrease 0.5 tablet and increase spironolactone to 2 tablets 1 week ago    Dad and sisters with high BP  No FH of stroke/ heart attack  Mom with ovarian cancer  Sister with ovarian mass  Another sister with heart stent 2/2 blockage    [place] the [place]    Pt works in a factory   with stroke and passed away 2014.       REVIEW OF SYSTEMS  10 point negative except as mentioned in HPI    Past Medical/Surgical History:  Past Medical History:   Diagnosis Date    Hypertension 2012     Past Surgical History:   Procedure Laterality Date    CARPAL TUNNEL RELEASE RT/LT Bilateral 12/2020    [facility]       Medications  Current Outpatient Medications   Medication Sig Dispense Refill    albuterol (PROAIR HFA/PROVENTIL HFA/VENTOLIN HFA) 108 (90 Base) MCG/ACT inhaler Inhale 2 puffs into the lungs every 6 hours as needed for shortness of breath, wheezing or cough. 18 g 3    amLODIPine (NORVASC) 10 MG tablet Take 1 tablet (10 mg) by mouth daily. 90 tablet 0    atorvastatin (LIPITOR) 40 MG tablet Take 1 tablet (40 mg) by mouth daily. 90 tablet 1    gabapentin (NEURONTIN) 300 MG capsule Take 1 capsule (300 mg) by mouth 3 times daily. 270 capsule 1    hydrOXYzine makeda (VISTARIL) 25 MG capsule Take 1 capsule (25 mg) by mouth nightly as needed.      spironolactone (ALDACTONE) 50 MG tablet Take 1 tablet (50 mg) by mouth daily. 30 tablet 0    VITAMIN D3 25 MCG tablet Take 1 tablet by mouth daily       No current  facility-administered medications for this visit.       Allergies  Allergies   Allergen Reactions    Other Environmental Allergy Itching    Sulfamethoxazole-Trimethoprim Rash         Family History  family history includes Breast Cancer in her maternal aunt; Diabetes Type 2  in her sister; Hyperlipidemia in her father; Hypertension in her father; Other - See Comments in her sister; Ovarian Cancer (age of onset: 60) in her mother.    Social History  Social History     Tobacco Use    Smoking status: Never     Passive exposure: Never    Smokeless tobacco: Never   Substance Use Topics    Alcohol use: Never       Physical Exam  /75 (BP Location: Left arm)   Pulse 67   Wt 67.2 kg (148 lb 1.6 oz)   SpO2 96%   BMI 27.98 kg/m    Body mass index is 27.98 kg/m .  GENERAL :  In no apparent distress  EYES: No scleral icterus,  No proptosis  NECK: No visible masses.   RESP: Normal breathing  NEURO: awake, alert, responds appropriately to questions.    Ext; No swelling    DATA REVIEW  Labs/Imaging      Lab Results   Component Value Date     02/01/2025    POTASSIUM 4.3 02/01/2025    CHLORIDE 104 02/01/2025    CO2 22 02/01/2025    ANIONGAP 11 02/01/2025     (H) 02/01/2025    BUN 39.0 (H) 02/01/2025    CR 1.13 (H) 02/01/2025    GFRESTIMATED 56 (L) 02/01/2025    MIRNA 9.4 02/01/2025         ASSESSMENT/PLAN:     Geovanna Sepulveda is a 59 year old female with primary hyperaldosteronism and MACE who is here for Follow-up medication titration prior to AVS.    ## 2.1 cm right adrenal nodule (?HU)  ## Primary hyperaldosteronism  ## MACE (+ve 1 mg and 8 mg DST)   Pt with spontaneous hypokalemia and labs compatible with primary hyperaldosteronism, no need for confirmatory test  Pt prefers surgery for treatment of her primary hyperaldosteronism.  ?spironolactone can interfere AVS result, to ensure accurate result  Regarding MACE, patient may need hydrocortisone after adrenal surgery  -- decrease spironolactone from 75 mg  daily to 50 mg daily  -- continue amlodipine at 10 mg daily, let me know if swelling or persistent BP >140/90  -- labs today and 1-2 week    Follow-up already scheduled.       Orders Placed This Encounter   Procedures    Basic metabolic panel    Basic metabolic panel     The longitudinal plan of care for the diagnosis(es)/condition(s) as documented were addressed during this visit. Due to the added complexity in care, I will continue to support Geovanna in the subsequent management and with ongoing continuity of care.       Antolin Millan MD

## 2025-02-18 NOTE — TELEPHONE ENCOUNTER
Pt had clinic appt with Dr. Nicolas @4010 today. She has not shown up for appt. VM x 2 left for pt to reschedule. Attempted to call , no answer.     Liliam Knapp RN  Nurse Care Coordinator-Interventional Radiology  Dr. Jacoby Arana  [phone]  [email or web address]

## 2025-02-19 ENCOUNTER — LAB (OUTPATIENT)
Dept: LAB | Facility: CLINIC | Age: 60
End: 2025-02-19
Payer: COMMERCIAL

## 2025-02-19 ENCOUNTER — OFFICE VISIT (OUTPATIENT)
Dept: ENDOCRINOLOGY | Facility: CLINIC | Age: 60
End: 2025-02-19
Payer: COMMERCIAL

## 2025-02-19 VITALS
HEART RATE: 67 BPM | SYSTOLIC BLOOD PRESSURE: 129 MMHG | WEIGHT: 148.1 LBS | DIASTOLIC BLOOD PRESSURE: 75 MMHG | BODY MASS INDEX: 27.98 KG/M2 | OXYGEN SATURATION: 96 %

## 2025-02-19 DIAGNOSIS — E26.9 HIGH ALDOSTERONE: ICD-10-CM

## 2025-02-19 DIAGNOSIS — I10 ESSENTIAL HYPERTENSION: Primary | ICD-10-CM

## 2025-02-19 LAB
ANION GAP SERPL CALCULATED.3IONS-SCNC: 11 MMOL/L (ref 7–15)
BUN SERPL-MCNC: 33 MG/DL (ref 6–20)
CALCIUM SERPL-MCNC: 9.9 MG/DL (ref 8.8–10.4)
CHLORIDE SERPL-SCNC: 104 MMOL/L (ref 98–107)
CREAT SERPL-MCNC: 0.97 MG/DL (ref 0.51–0.95)
EGFRCR SERPLBLD CKD-EPI 2021: 67 ML/MIN/1.73M2
GLUCOSE SERPL-MCNC: 116 MG/DL (ref 70–99)
HCO3 SERPL-SCNC: 22 MMOL/L (ref 22–29)
POTASSIUM SERPL-SCNC: 4.2 MMOL/L (ref 3.4–5.3)
SODIUM SERPL-SCNC: 137 MMOL/L (ref 135–145)

## 2025-02-19 PROCEDURE — 99213 OFFICE O/P EST LOW 20 MIN: CPT | Performed by: INTERNAL MEDICINE

## 2025-02-19 PROCEDURE — 80048 BASIC METABOLIC PNL TOTAL CA: CPT

## 2025-02-19 PROCEDURE — 36415 COLL VENOUS BLD VENIPUNCTURE: CPT

## 2025-02-19 PROCEDURE — 84295 ASSAY OF SERUM SODIUM: CPT

## 2025-02-19 PROCEDURE — 84244 ASSAY OF RENIN: CPT

## 2025-02-19 PROCEDURE — 82310 ASSAY OF CALCIUM: CPT

## 2025-02-19 PROCEDURE — G2211 COMPLEX E/M VISIT ADD ON: HCPCS | Performed by: INTERNAL MEDICINE

## 2025-02-19 RX ORDER — ATORVASTATIN CALCIUM 40 MG/1
40 TABLET, FILM COATED ORAL DAILY
Qty: 90 TABLET | Refills: 1 | Status: SHIPPED | OUTPATIENT
Start: 2025-02-19 | End: 2025-08-18

## 2025-02-19 RX ORDER — AMLODIPINE BESYLATE 10 MG/1
10 TABLET ORAL DAILY
Qty: 90 TABLET | Refills: 0 | Status: SHIPPED | OUTPATIENT
Start: 2025-02-19

## 2025-02-19 RX ORDER — SPIRONOLACTONE 50 MG/1
50 TABLET, FILM COATED ORAL DAILY
Qty: 30 TABLET | Refills: 0 | Status: SHIPPED | OUTPATIENT
Start: 2025-02-19 | End: 2025-03-21

## 2025-02-19 RX ORDER — ATORVASTATIN CALCIUM 40 MG/1
40 TABLET, FILM COATED ORAL DAILY
COMMUNITY
End: 2025-02-19

## 2025-02-19 NOTE — LETTER
2/19/2025      Geovanna Sepulveda  [address]  [place] [address]      Dear Colleague,    Thank you for referring your patient, Geovanna Sepulveda, to the [facility] [place]. Please see a copy of my visit note below.    Endocrinology Clinic Visit       Geovanna Sepulveda is a 59 year old female with HTN who is here for Follow-up hyperaldosteronism .    Interval History  Pt scheduled to meet with IR 3/2025  No swelling on amlodipine  Stopped soda, eating healthy  Urine is fine    Initial visit  Pt started to have high BP 2010,   Pt saw primary Sherry CAMPOVERDE CNP, secondary causes of hypertension were checked and found to have primary hyperaldosteronism.    Pt has been off amlodipine and potassium for 1 month  Losartan decrease 0.5 tablet and increase spironolactone to 2 tablets 1 week ago    Dad and sisters with high BP  No FH of stroke/ heart attack  Mom with ovarian cancer  Sister with ovarian mass  Another sister with heart stent 2/2 blockage    [place]    Pt works in a factory   with stroke and passed away 2014.       REVIEW OF SYSTEMS  10 point negative except as mentioned in HPI    Past Medical/Surgical History:  Past Medical History:   Diagnosis Date     Hypertension 2012     Past Surgical History:   Procedure Laterality Date     CARPAL TUNNEL RELEASE RT/LT Bilateral 12/2020    [facility]       Medications  Current Outpatient Medications   Medication Sig Dispense Refill     albuterol (PROAIR HFA/PROVENTIL HFA/VENTOLIN HFA) 108 (90 Base) MCG/ACT inhaler Inhale 2 puffs into the lungs every 6 hours as needed for shortness of breath, wheezing or cough. 18 g 3     amLODIPine (NORVASC) 10 MG tablet Take 1 tablet (10 mg) by mouth daily. 90 tablet 0     atorvastatin (LIPITOR) 40 MG tablet Take 1 tablet (40 mg) by mouth daily. 90 tablet 1     gabapentin (NEURONTIN) 300 MG capsule Take 1 capsule (300 mg) by mouth 3 times daily. 270 capsule 1     hydrOXYzine  makeda (VISTARIL) 25 MG capsule Take 1 capsule (25 mg) by mouth nightly as needed.       spironolactone (ALDACTONE) 50 MG tablet Take 1 tablet (50 mg) by mouth daily. 30 tablet 0     VITAMIN D3 25 MCG tablet Take 1 tablet by mouth daily       No current facility-administered medications for this visit.       Allergies  Allergies   Allergen Reactions     Other Environmental Allergy Itching     Sulfamethoxazole-Trimethoprim Rash         Family History  family history includes Breast Cancer in her maternal aunt; Diabetes Type 2  in her sister; Hyperlipidemia in her father; Hypertension in her father; Other - See Comments in her sister; Ovarian Cancer (age of onset: 60) in her mother.    Social History  Social History     Tobacco Use     Smoking status: Never     Passive exposure: Never     Smokeless tobacco: Never   Substance Use Topics     Alcohol use: Never       Physical Exam  /75 (BP Location: Left arm)   Pulse 67   Wt 67.2 kg (148 lb 1.6 oz)   SpO2 96%   BMI 27.98 kg/m    Body mass index is 27.98 kg/m .  GENERAL :  In no apparent distress  EYES: No scleral icterus,  No proptosis  NECK: No visible masses.   RESP: Normal breathing  NEURO: awake, alert, responds appropriately to questions.    Ext; No swelling    DATA REVIEW  Labs/Imaging      Lab Results   Component Value Date     02/01/2025    POTASSIUM 4.3 02/01/2025    CHLORIDE 104 02/01/2025    CO2 22 02/01/2025    ANIONGAP 11 02/01/2025     (H) 02/01/2025    BUN 39.0 (H) 02/01/2025    CR 1.13 (H) 02/01/2025    GFRESTIMATED 56 (L) 02/01/2025    MIRNA 9.4 02/01/2025         ASSESSMENT/PLAN:     Geovanna Sepulveda is a 59 year old female with primary hyperaldosteronism and MACE who is here for Follow-up medication titration prior to AVS.    ## 2.1 cm right adrenal nodule (?HU)  ## Primary hyperaldosteronism  ## MACE (+ve 1 mg and 8 mg DST)   Pt with spontaneous hypokalemia and labs compatible with primary hyperaldosteronism, no need for confirmatory  test  Pt prefers surgery for treatment of her primary hyperaldosteronism.  ?spironolactone can interfere AVS result, to ensure accurate result  Regarding MACE, patient may need hydrocortisone after adrenal surgery  -- decrease spironolactone from 75 mg daily to 50 mg daily  -- continue amlodipine at 10 mg daily, let me know if swelling or persistent BP >140/90  -- labs today and 1-2 week    Follow-up already scheduled.       Orders Placed This Encounter   Procedures     Basic metabolic panel     Basic metabolic panel     The longitudinal plan of care for the diagnosis(es)/condition(s) as documented were addressed during this visit. Due to the added complexity in care, I will continue to support Geovanna in the subsequent management and with ongoing continuity of care.       Antolin Millan MD        Again, thank you for allowing me to participate in the care of your patient.        Sincerely,        Antolin Millan MD    Electronically signed

## 2025-02-19 NOTE — RESULT ENCOUNTER NOTE
Hello -    Here are my comments about the recent results: potassium and kidney function are normal. Will recheck 1-2 weeks.    Regards,   Antolin Millan MD

## 2025-02-19 NOTE — PATIENT INSTRUCTIONS
## 2.1 cm right adrenal nodule   ## Primary hyperaldosteronism  ## Mild autonomous Cortisol Secretion (+ve 1 mg and 8 mg DST)   Pt with spontaneous hypokalemia and labs compatible with primary hyperaldosteronism, no need for confirmatory test  Pt still prefers surgery for treatment of her primary hyperaldosteronism.  ?spironolactone can interfere AVS result, to ensure accurate result, will decrease spironolactone from 100 mg daily to 75 mg daily  We discussed about MACE, patient may need hydrocortisone after adrenal surgery  -- decrease spironolactone from 75 mg daily to 50 mg daily  -- continue amlodipine at 10 mg daily, let me know if swelling or persistent BP >140/90  -- labs today and 1-2 week    Follow-up already scheduled.

## 2025-02-21 LAB — RENIN PLAS-CCNC: <0.1 NG/ML/HR

## 2025-03-06 ENCOUNTER — TELEPHONE (OUTPATIENT)
Dept: ENDOCRINOLOGY | Facility: CLINIC | Age: 60
End: 2025-03-06
Payer: COMMERCIAL

## 2025-03-06 DIAGNOSIS — E27.9 ADRENAL NODULE: Primary | ICD-10-CM

## 2025-03-06 NOTE — TELEPHONE ENCOUNTER
Chris Austin,    I called and left voicemail message.  We can hold off on adrenal vein sampling for now. Please contact radiology.  We can discuss more at our follow up 3/26/25.    Please let me know if any questions or concerns.    Best,  Antolin Millan MD'

## 2025-03-09 ENCOUNTER — LAB (OUTPATIENT)
Dept: LAB | Facility: CLINIC | Age: 60
End: 2025-03-09
Payer: COMMERCIAL

## 2025-03-09 DIAGNOSIS — I10 ESSENTIAL HYPERTENSION: ICD-10-CM

## 2025-03-09 DIAGNOSIS — E27.9 ADRENAL NODULE: ICD-10-CM

## 2025-03-09 LAB
ANION GAP SERPL CALCULATED.3IONS-SCNC: 11 MMOL/L (ref 7–15)
BUN SERPL-MCNC: 29.8 MG/DL (ref 6–20)
CALCIUM SERPL-MCNC: 9.4 MG/DL (ref 8.8–10.4)
CHLORIDE SERPL-SCNC: 103 MMOL/L (ref 98–107)
CREAT SERPL-MCNC: 0.92 MG/DL (ref 0.51–0.95)
EGFRCR SERPLBLD CKD-EPI 2021: 72 ML/MIN/1.73M2
GLUCOSE SERPL-MCNC: 119 MG/DL (ref 70–99)
HCO3 SERPL-SCNC: 24 MMOL/L (ref 22–29)
POTASSIUM SERPL-SCNC: 4 MMOL/L (ref 3.4–5.3)
SODIUM SERPL-SCNC: 138 MMOL/L (ref 135–145)

## 2025-03-09 PROCEDURE — 80048 BASIC METABOLIC PNL TOTAL CA: CPT

## 2025-03-09 PROCEDURE — 82627 DEHYDROEPIANDROSTERONE: CPT

## 2025-03-09 PROCEDURE — 84132 ASSAY OF SERUM POTASSIUM: CPT

## 2025-03-09 PROCEDURE — 36415 COLL VENOUS BLD VENIPUNCTURE: CPT

## 2025-03-09 NOTE — RESULT ENCOUNTER NOTE
Hello -    Here are my comments about the recent results: labs are normal, continue current medication.    Regards,   Antolin Millan MD

## 2025-03-10 LAB — DHEA-S SERPL-MCNC: 57 UG/DL (ref 35–430)

## 2025-03-25 ENCOUNTER — ONCOLOGY VISIT (OUTPATIENT)
Dept: RADIOLOGY | Facility: CLINIC | Age: 60
End: 2025-03-25
Attending: NURSE PRACTITIONER
Payer: COMMERCIAL

## 2025-03-25 VITALS
HEIGHT: 61 IN | SYSTOLIC BLOOD PRESSURE: 137 MMHG | BODY MASS INDEX: 28.32 KG/M2 | TEMPERATURE: 97.9 F | OXYGEN SATURATION: 98 % | HEART RATE: 67 BPM | WEIGHT: 150 LBS | RESPIRATION RATE: 20 BRPM | DIASTOLIC BLOOD PRESSURE: 77 MMHG

## 2025-03-25 DIAGNOSIS — E26.9 HYPERALDOSTERONISM: Primary | ICD-10-CM

## 2025-03-25 PROCEDURE — 99204 OFFICE O/P NEW MOD 45 MIN: CPT | Mod: GC | Performed by: STUDENT IN AN ORGANIZED HEALTH CARE EDUCATION/TRAINING PROGRAM

## 2025-03-25 ASSESSMENT — PAIN SCALES - GENERAL: PAINLEVEL_OUTOF10: NO PAIN (0)

## 2025-03-25 NOTE — PROGRESS NOTES
INTERVENTIONAL RADIOLOGY CONSULTATION    Name: Geovanna Sepulveda  Age: 59 year old   Referring Physician: Dr. Millan   REASON FOR REFERRAL: Primary hyperaldosteronism/AVS     HPI: Geovanna Sepulveda is a 59-year-old female with high blood pressure (200s) initially diagnosed in .  [place] in [place] with ongoing issues of hypertension as well as spontaneous hypokalemia, eventually leading a diagnosis of primary hyperaldosteronism (Howie 70.8 and renin 0.9) without any need for confirmatory testing.  Additionally, CT imaging obtained during workup showed a 2.1 cm right adrenal nodule.  She was referred for adrenal vein sampling for lateralization of the abnormal adrenal gland.    Today, she is doing well. She is interested in getting the cause of her hyperaldosteronism treated.    She did describe a chest discomfort which she experiences occasionally at work. This improves when she drinks water and takes her inhaler. Endorses occasional cramping in lower extremities.    ROS:  Negative unless otherwise stated above.    PAST MEDICAL HISTORY:   Past Medical History:   Diagnosis Date    Hypertension        PAST SURGICAL HISTORY:   Past Surgical History:   Procedure Laterality Date    CARPAL TUNNEL RELEASE RT/LT Bilateral 2020    [facility]       FAMILY HISTORY:   Family History   Problem Relation Age of Onset    Ovarian Cancer Mother 60         from mets to lungs    Hypertension Father     Hyperlipidemia Father     Other - See Comments Sister         ovarian mass - no CA    Diabetes Type 2  Sister     Breast Cancer Maternal Aunt         treated       SOCIAL HISTORY:   Social History     Tobacco Use    Smoking status: Never     Passive exposure: Never    Smokeless tobacco: Never   Substance Use Topics    Alcohol use: Never       MEDICATIONS:   Current Outpatient Medications   Medication Instructions    albuterol (PROAIR HFA/PROVENTIL HFA/VENTOLIN HFA) 108 (90 Base) MCG/ACT inhaler 2 puffs,  "Inhalation, EVERY 6 HOURS PRN    amLODIPine (NORVASC) 10 mg, Oral, DAILY    atorvastatin (LIPITOR) 40 mg, Oral, DAILY    gabapentin (NEURONTIN) 300 mg, Oral, 3 TIMES DAILY    hydrOXYzine makeda (VISTARIL) 25 mg, AT BEDTIME PRN    spironolactone (ALDACTONE) 50 mg, Oral, DAILY    VITAMIN D3 25 MCG tablet 1 tablet, DAILY     ALLERGIES:   Other environmental allergy and Sulfamethoxazole-trimethoprim    Physical Examination:   VITALS:   /77 (BP Location: Right arm, Patient Position: Sitting, Cuff Size: Adult Regular)   Pulse 67   Temp 97.9  F (36.6  C) (Oral)   Resp 20   Ht 1.549 m (5' 1\")   Wt 68 kg (150 lb)   SpO2 98%   BMI 28.34 kg/m       Physical Examination:   General: Alert and oriented x3. No acute distress.   Cardiovascular: Heart RRR. S1 and S2. No murmurs or rubs appreciated.  Pulmonary: Respirations non-labored. Lungs CTAB.  Abdomen: Abdomen soft, non-distended. Bowel sounds present.  Extremities:  No lower extremity edema.  Neuro: grossly intact    Labs:  Lab Results   Component Value Date    WBC 9.8 10/25/2023    HGB 14.5 10/25/2023    HCT 44.1 10/25/2023    MCV 88 10/25/2023     10/25/2023     Lab Results   Component Value Date     03/09/2025    POTASSIUM 4.0 03/09/2025    CHLORIDE 103 03/09/2025    CO2 24 03/09/2025     (H) 03/09/2025     Lab Results   Component Value Date    AST 25 12/03/2024    ALT 20 12/03/2024    ALKPHOS 72 12/03/2024    BILITOTAL 0.5 12/03/2024     No results found for: \"INR\"  Lab Results   Component Value Date    BUN 29.8 (H) 03/09/2025    CR 0.92 03/09/2025      Howie: 70.8  Renin: 0.9    Diagnostic studies:   I reviewed CT dated 10/30/2024.  This shows normal left adrenal with expected drainage. There is a lipid rich adenoma in the right adrenal gland.  2 accessory right hepatic veins were noted with right adrenal vein likely subjacent to the accessory hepatic veins.    Assessment   Geovanna Sepulveda is a 59 year old with secondary hypertension, " spontaneous hypokalemia, 2.1 cm right adrenal nodule, and primary hyperaldosteronism, who was referred for consideration of adrenal vein sampling and lateralization of the abnormal adrenal gland as she would like to proceed with adrenalectomy for treatment of her secondary hypertension.     I reviewed clinical history, labs and images and she is a candidate for adrenal vein sampling.     The procedure was described to the patient including that this procedure does not provide any therapeutic benefit but is instead to determine if there is a surgical option for her hyperaldosteronism. It was described that this is a same day procedure performed at the [facility] under moderate sedation. The risks of bleeding and infection were discussed. The risk of need for repeat sampling was also discussed due to the possibility of non diagnostic sample. The patient is agreeable and wishes to proceed.     Plan   - plan for adrenal vein sampling under moderate sedation 4-6 weeks after stopping spironolactone (if able).   - Geovanna is going to see her endocrinologist tomorrow. As long as Dr. Millan still recommends adrenal vein sampling after that visit then we will schedule her for the procedure.      Physician Attestation   I, Urvashi Nicolas MD, saw this patient and agree with the findings and plan of care as documented in the note.    Items personally reviewed/procedural attestation: vitals, labs, imaging and agree with the interpretation documented in the note.    30 minutes spent by me on the date of the encounter doing chart review, history and exam, documentation and further activities per the note. More than 50% of this time was spent with patient counseling.    Urvashi Nicolas MD    Interventional Radiology  Pager  [phone]    CC  Patient Care Team:  Sherry Jenkins APRN CNP as PCP - General (Family Medicine)  Sherry Jenkins APRN CNP as Assigned PCP  Antolin Millan MD as Assigned Endocrinology  Provider

## 2025-03-25 NOTE — LETTER
3/25/2025      Geovanna Sepulveda  [address]  [place] [address]      Dear Colleague,    Thank you for referring your patient, Geovanna Sepulveda, to the [facility]. Please see a copy of my visit note below.        INTERVENTIONAL RADIOLOGY CONSULTATION    Name: Geovanna Sepulveda  Age: 59 year old   Referring Physician: Dr. Millan   REASON FOR REFERRAL: Primary hyperaldosteronism/AVS     HPI: Geovanna Sepulveda is a 59-year-old female with high blood pressure (200s) initially diagnosed in .  [place] in [place] with ongoing issues of hypertension as well as spontaneous hypokalemia, eventually leading a diagnosis of primary hyperaldosteronism (Howie 70.8 and renin 0.9) without any need for confirmatory testing.  Additionally, CT imaging obtained during workup showed a 2.1 cm right adrenal nodule.  She was referred for adrenal vein sampling for lateralization of the abnormal adrenal gland.    Today, she is doing well. She is interested in getting the cause of her hyperaldosteronism treated.    She did describe a chest discomfort which she experiences occasionally at work. This improves when she drinks water and takes her inhaler. Endorses occasional cramping in lower extremities.    ROS:  Negative unless otherwise stated above.    PAST MEDICAL HISTORY:   Past Medical History:   Diagnosis Date     Hypertension        PAST SURGICAL HISTORY:   Past Surgical History:   Procedure Laterality Date     CARPAL TUNNEL RELEASE RT/LT Bilateral 2020    [facility]       FAMILY HISTORY:   Family History   Problem Relation Age of Onset     Ovarian Cancer Mother 60         from mets to lungs     Hypertension Father      Hyperlipidemia Father      Other - See Comments Sister         ovarian mass - no CA     Diabetes Type 2  Sister      Breast Cancer Maternal Aunt         treated       SOCIAL HISTORY:   Social History     Tobacco Use     Smoking status: Never     Passive  "exposure: Never     Smokeless tobacco: Never   Substance Use Topics     Alcohol use: Never       MEDICATIONS:   Current Outpatient Medications   Medication Instructions     albuterol (PROAIR HFA/PROVENTIL HFA/VENTOLIN HFA) 108 (90 Base) MCG/ACT inhaler 2 puffs, Inhalation, EVERY 6 HOURS PRN     amLODIPine (NORVASC) 10 mg, Oral, DAILY     atorvastatin (LIPITOR) 40 mg, Oral, DAILY     gabapentin (NEURONTIN) 300 mg, Oral, 3 TIMES DAILY     hydrOXYzine makeda (VISTARIL) 25 mg, AT BEDTIME PRN     spironolactone (ALDACTONE) 50 mg, Oral, DAILY     VITAMIN D3 25 MCG tablet 1 tablet, DAILY     ALLERGIES:   Other environmental allergy and Sulfamethoxazole-trimethoprim    Physical Examination:   VITALS:   /77 (BP Location: Right arm, Patient Position: Sitting, Cuff Size: Adult Regular)   Pulse 67   Temp 97.9  F (36.6  C) (Oral)   Resp 20   Ht 1.549 m (5' 1\")   Wt 68 kg (150 lb)   SpO2 98%   BMI 28.34 kg/m       Physical Examination:   General: Alert and oriented x3. No acute distress.   Cardiovascular: Heart RRR. S1 and S2. No murmurs or rubs appreciated.  Pulmonary: Respirations non-labored. Lungs CTAB.  Abdomen: Abdomen soft, non-distended. Bowel sounds present.  Extremities:  No lower extremity edema.  Neuro: grossly intact    Labs:  Lab Results   Component Value Date    WBC 9.8 10/25/2023    HGB 14.5 10/25/2023    HCT 44.1 10/25/2023    MCV 88 10/25/2023     10/25/2023     Lab Results   Component Value Date     03/09/2025    POTASSIUM 4.0 03/09/2025    CHLORIDE 103 03/09/2025    CO2 24 03/09/2025     (H) 03/09/2025     Lab Results   Component Value Date    AST 25 12/03/2024    ALT 20 12/03/2024    ALKPHOS 72 12/03/2024    BILITOTAL 0.5 12/03/2024     No results found for: \"INR\"  Lab Results   Component Value Date    BUN 29.8 (H) 03/09/2025    CR 0.92 03/09/2025      Howie: 70.8  Renin: 0.9    Diagnostic studies:   I reviewed CT dated 10/30/2024.  This shows normal left adrenal with expected " drainage. There is a lipid rich adenoma in the right adrenal gland.  2 accessory right hepatic veins were noted with right adrenal vein likely subjacent to the accessory hepatic veins.    Assessment   Geovanna Sepulveda is a 59 year old with secondary hypertension, spontaneous hypokalemia, 2.1 cm right adrenal nodule, and primary hyperaldosteronism, who was referred for consideration of adrenal vein sampling and lateralization of the abnormal adrenal gland as she would like to proceed with adrenalectomy for treatment of her secondary hypertension.     I reviewed clinical history, labs and images and she is a candidate for adrenal vein sampling.     The procedure was described to the patient including that this procedure does not provide any therapeutic benefit but is instead to determine if there is a surgical option for her hyperaldosteronism. It was described that this is a same day procedure performed at the [facility] under moderate sedation. The risks of bleeding and infection were discussed. The risk of need for repeat sampling was also discussed due to the possibility of non diagnostic sample. The patient is agreeable and wishes to proceed.     Plan   - plan for adrenal vein sampling under moderate sedation 4-6 weeks after stopping spironolactone (if able).   - Geovanna is going to see her endocrinologist tomorrow. As long as Dr. Millan still recommends adrenal vein sampling after that visit then we will schedule her for the procedure.      Physician Attestation  I, Urvashi Nicolas MD, saw this patient and agree with the findings and plan of care as documented in the note.    Items personally reviewed/procedural attestation: vitals, labs, imaging and agree with the interpretation documented in the note.    30 minutes spent by me on the date of the encounter doing chart review, history and exam, documentation and further activities per the note. More than 50% of this time was spent with patient  counseling.    Urvashi Nicolas MD    Interventional Radiology  Pager  [phone]      Patient Care Team:  Sherry Jenkins APRN CNP as PCP - General (Family Medicine)  Sherry Jenkins APRN CNP as Assigned PCP  Antolin Millan MD as Assigned Endocrinology Provider        Again, thank you for allowing me to participate in the care of your patient.        Sincerely,        Urvashi Nicolas MD    Electronically signed

## 2025-03-25 NOTE — NURSING NOTE
"Oncology Rooming Note    March 25, 2025 1:41 PM   Geovanna Sepulveda is a 59 year old female who presents for:    Chief Complaint   Patient presents with    Oncology Clinic Visit     Possible adrenal vein sampling     Initial Vitals: /77 (BP Location: Right arm, Patient Position: Sitting, Cuff Size: Adult Regular)   Pulse 67   Temp 97.9  F (36.6  C) (Oral)   Resp 20   Ht 1.549 m (5' 1\")   Wt 68 kg (150 lb)   SpO2 98%   BMI 28.34 kg/m   Estimated body mass index is 28.34 kg/m  as calculated from the following:    Height as of this encounter: 1.549 m (5' 1\").    Weight as of this encounter: 68 kg (150 lb). Body surface area is 1.71 meters squared.  No Pain (0) Comment: Data Unavailable   No LMP recorded. Patient is postmenopausal.  Allergies reviewed: Yes  Medications reviewed: Yes    Medications: Medication refills not needed today.  Pharmacy name entered into [employer]:    [id number] - [place], [place] - [address] [facility] - [place], [place] - [address]    Frailty Screening:   Is the patient here for a new oncology consult visit in cancer care? 1. Yes. Over the past month, have you experienced difficulty or required a caregiver to assist with:   1. Balance, walking or general mobility (including any falls)? NO  2. Completion of self-care tasks such as bathing, dressing, toileting, grooming/hygiene?  NO  3. Concentration or memory that affects your daily life?  NO     PHQ9:  Did this patient require a PHQ9?: No      Clinical concerns:  none      Ольга Nolen"

## 2025-03-26 ENCOUNTER — OFFICE VISIT (OUTPATIENT)
Dept: ENDOCRINOLOGY | Facility: CLINIC | Age: 60
End: 2025-03-26
Payer: COMMERCIAL

## 2025-03-26 VITALS
SYSTOLIC BLOOD PRESSURE: 136 MMHG | WEIGHT: 149.4 LBS | OXYGEN SATURATION: 96 % | HEART RATE: 63 BPM | BODY MASS INDEX: 28.23 KG/M2 | DIASTOLIC BLOOD PRESSURE: 81 MMHG

## 2025-03-26 DIAGNOSIS — E27.9 ADRENAL NODULE: ICD-10-CM

## 2025-03-26 DIAGNOSIS — E26.9 HIGH ALDOSTERONE: ICD-10-CM

## 2025-03-26 DIAGNOSIS — E24.9: ICD-10-CM

## 2025-03-26 DIAGNOSIS — I10 ESSENTIAL HYPERTENSION: ICD-10-CM

## 2025-03-26 DIAGNOSIS — R73.03 PREDIABETES: Primary | ICD-10-CM

## 2025-03-26 PROCEDURE — G2211 COMPLEX E/M VISIT ADD ON: HCPCS | Performed by: INTERNAL MEDICINE

## 2025-03-26 PROCEDURE — 3075F SYST BP GE 130 - 139MM HG: CPT | Performed by: INTERNAL MEDICINE

## 2025-03-26 PROCEDURE — 99214 OFFICE O/P EST MOD 30 MIN: CPT | Performed by: INTERNAL MEDICINE

## 2025-03-26 PROCEDURE — 3079F DIAST BP 80-89 MM HG: CPT | Performed by: INTERNAL MEDICINE

## 2025-03-26 RX ORDER — SPIRONOLACTONE 50 MG/1
50 TABLET, FILM COATED ORAL DAILY
Qty: 90 TABLET | Refills: 1 | Status: SHIPPED | OUTPATIENT
Start: 2025-03-26 | End: 2025-09-22

## 2025-03-26 RX ORDER — AMLODIPINE BESYLATE 10 MG/1
10 TABLET ORAL DAILY
Qty: 90 TABLET | Refills: 1 | Status: SHIPPED | OUTPATIENT
Start: 2025-03-26

## 2025-03-26 NOTE — PATIENT INSTRUCTIONS
## 2.1 cm right adrenal nodule   ## Primary hyperaldosteronism  ## MACE (+ve 1 mg and 8 mg DST)   ## h/o fallopian tube cleaning in the St. Francis Regional Medical Center 2005  Pt with spontaneous hypokalemia and labs compatible with primary hyperaldosteronism, no need for confirmatory test.  Regarding excess cortisol secretion, both 1 mg and 8 mg dexamethasone suppression are positive.  Today review approach for treatment, for excess cortisol, no medication for direct treatment.  For primary hyperaldosteronism, we have effective oral medication eg. Spironolactone.  AVS in cortisol excess could be inaccurate  Recommend surgical treatment for excess cortisol and if needed, medication for hyperaldosteronism  -- hold off on AVS  -- surgery referral for right adrenalectomy for mild autonomous cortisol excess  -- patient may need steroid replacement after surgery until we prove that contralateral adrenal gland recovered from secondary adrenal insufficiency  -- patient has h/o fallopian tube cleaning 2005, patient will try to get record for the Gillette Children's Specialty Healthcare to determine extent of procedure and intraabdominal involvement  -- continue spironolactone and amlodipine for now  -- labs 1 month    Follow-up 3 months

## 2025-03-26 NOTE — PROGRESS NOTES
Endocrinology Clinic Visit       Geovanna Sepulveda is a 59 year old female with HTN who is here for Follow-up adrenal nodule with MACE and hyperaldosteronism .    Interval History  No changes in symptoms  Still thirsty and wakes up 3 times at night to the bathroom  Pt was seen by IR, planning AVS if helpful from endocrine standpoint    Initial visit  Pt started to have high BP 2010,   Pt saw primary Sherry CAMPOVERDE CNP, secondary causes of hypertension were checked and found to have primary hyperaldosteronism.    Pt has been off amlodipine and potassium for 1 month  Losartan decrease 0.5 tablet and increase spironolactone to 2 tablets 1 week ago    Dad and sisters with high BP  No FH of stroke/ heart attack  Mom with ovarian cancer  Sister with ovarian mass  Another sister with heart stent 2/2 blockage    Pt moved to the  2019    Pt works in a factory   with stroke and passed away 2014.       REVIEW OF SYSTEMS  10 point negative except as mentioned in HPI    Past Medical/Surgical History:  Past Medical History:   Diagnosis Date    Hypertension 2012     Past Surgical History:   Procedure Laterality Date    CARPAL TUNNEL RELEASE RT/LT Bilateral 12/2020    [facility]    GYN SURGERY  2005    laparoscopy to clean fallopian tube in the [facility]       Medications  Current Outpatient Medications   Medication Sig Dispense Refill    albuterol (PROAIR HFA/PROVENTIL HFA/VENTOLIN HFA) 108 (90 Base) MCG/ACT inhaler Inhale 2 puffs into the lungs every 6 hours as needed for shortness of breath, wheezing or cough. 18 g 3    amLODIPine (NORVASC) 10 MG tablet Take 1 tablet (10 mg) by mouth daily. 90 tablet 1    atorvastatin (LIPITOR) 40 MG tablet Take 1 tablet (40 mg) by mouth daily. 90 tablet 1    gabapentin (NEURONTIN) 300 MG capsule Take 1 capsule (300 mg) by mouth 3 times daily. 270 capsule 1    hydrOXYzine makeda (VISTARIL) 25 MG capsule Take 1 capsule (25 mg) by mouth nightly as needed.      spironolactone  (ALDACTONE) 50 MG tablet Take 1 tablet (50 mg) by mouth daily. 90 tablet 1    VITAMIN D3 25 MCG tablet Take 1 tablet by mouth daily       No current facility-administered medications for this visit.       Allergies  Allergies   Allergen Reactions    Other Environmental Allergy Itching    Sulfamethoxazole-Trimethoprim Rash         Family History  family history includes Breast Cancer in her maternal aunt; Diabetes Type 2  in her sister; Hyperlipidemia in her father; Hypertension in her father; Other - See Comments in her sister; Ovarian Cancer (age of onset: 60) in her mother.    Social History  Social History     Tobacco Use    Smoking status: Never     Passive exposure: Never    Smokeless tobacco: Never   Substance Use Topics    Alcohol use: Never       Physical Exam  /81 (BP Location: Left arm)   Pulse 63   Wt 67.8 kg (149 lb 6.4 oz)   SpO2 96%   BMI 28.23 kg/m    Body mass index is 28.23 kg/m .  GENERAL :  In no apparent distress  EYES: No scleral icterus,  No proptosis  NECK: No visible masses.   RESP: Normal breathing  NEURO: awake, alert, responds appropriately to questions.    Ext; No swelling    DATA REVIEW  Labs/Imaging   Latest Reference Range & Units 11/23/24 08:50   Cortisol, Dexamethasone <1.8 ug/dL 2.9 (H)   (H): Data is abnormally high   Latest Reference Range & Units 11/30/24 08:45   Cortisol, Dexamethasone <1.8 ug/dL 2.0 (H)   (H): Data is abnormally high        Lab Results   Component Value Date     03/09/2025    POTASSIUM 4.0 03/09/2025    CHLORIDE 103 03/09/2025    CO2 24 03/09/2025    ANIONGAP 11 03/09/2025     (H) 03/09/2025    BUN 29.8 (H) 03/09/2025    CR 0.92 03/09/2025    GFRESTIMATED 72 03/09/2025    MIRNA 9.4 03/09/2025         ASSESSMENT/PLAN:     Geovanna Sepulveda is a 59 year old female with HTN who is here for Follow-up adrenal nodule with MACE and hyperaldosteronism .    ## 2.1 cm right adrenal nodule   ## Primary hyperaldosteronism  ## MACE (+ve 1 mg and 8 mg DST)    ## h/o fallopian tube cleaning in the [facility] 2005  Pt with spontaneous hypokalemia and labs compatible with primary hyperaldosteronism, no need for confirmatory test.  Regarding excess cortisol secretion, both 1 mg and 8 mg dexamethasone suppression are positive.  Today review approach for treatment, for excess cortisol, no medication for direct treatment.  For primary hyperaldosteronism, we have effective oral medication eg. Spironolactone.  AVS in cortisol excess could be inaccurate  Recommend surgical treatment for excess cortisol and if needed, medication for hyperaldosteronism  -- hold off on AVS  -- surgery referral for right adrenalectomy for mild autonomous cortisol excess  -- patient may need steroid replacement after surgery until we prove that contralateral adrenal gland recovered from secondary adrenal insufficiency  -- patient has h/o fallopian tube cleaning 2005, patient will try to get record for the [facility] to determine extent of procedure and intraabdominal involvement  -- continue spironolactone and amlodipine for now  -- labs 1 month    Follow-up 3 months       Orders Placed This Encounter   Procedures    Hemoglobin A1c    Basic metabolic panel    Adult Gen Surg  Referral     The longitudinal plan of care for the diagnosis(es)/condition(s) as documented were addressed during this visit. Due to the added complexity in care, I will continue to support Geovanna in the subsequent management and with ongoing continuity of care.       Antolin Millan MD

## 2025-03-26 NOTE — LETTER
3/26/2025      Geovanna Sepulveda  [address]  [place] [address]      Dear Colleague,    Thank you for referring your patient, Geovanna Sepulveda, to the [facility] [place]. Please see a copy of my visit note below.    Endocrinology Clinic Visit       Geovanna Sepulveda is a 59 year old female with HTN who is here for Follow-up adrenal nodule with MACE and hyperaldosteronism .    Interval History  No changes in symptoms  Still thirsty and wakes up 3 times at night to the bathroom  Pt was seen by IR, planning AVS if helpful from endocrine standpoint    Initial visit  Pt started to have high BP 2010,   Pt saw primary Sherry CAMPOVERDE CNP, secondary causes of hypertension were checked and found to have primary hyperaldosteronism.    Pt has been off amlodipine and potassium for 1 month  Losartan decrease 0.5 tablet and increase spironolactone to 2 tablets 1 week ago    Dad and sisters with high BP  No FH of stroke/ heart attack  Mom with ovarian cancer  Sister with ovarian mass  Another sister with heart stent 2/2 blockage    [place] the [place]    Pt works in a factory   with stroke and passed away 2014.       REVIEW OF SYSTEMS  10 point negative except as mentioned in HPI    Past Medical/Surgical History:  Past Medical History:   Diagnosis Date     Hypertension 2012     Past Surgical History:   Procedure Laterality Date     CARPAL TUNNEL RELEASE RT/LT Bilateral 12/2020    [facility]     GYN SURGERY  2005    laparoscopy to clean fallopian tube in the [facility]       Medications  Current Outpatient Medications   Medication Sig Dispense Refill     albuterol (PROAIR HFA/PROVENTIL HFA/VENTOLIN HFA) 108 (90 Base) MCG/ACT inhaler Inhale 2 puffs into the lungs every 6 hours as needed for shortness of breath, wheezing or cough. 18 g 3     amLODIPine (NORVASC) 10 MG tablet Take 1 tablet (10 mg) by mouth daily. 90 tablet 1     atorvastatin (LIPITOR) 40 MG tablet Take 1 tablet  (40 mg) by mouth daily. 90 tablet 1     gabapentin (NEURONTIN) 300 MG capsule Take 1 capsule (300 mg) by mouth 3 times daily. 270 capsule 1     hydrOXYzine makeda (VISTARIL) 25 MG capsule Take 1 capsule (25 mg) by mouth nightly as needed.       spironolactone (ALDACTONE) 50 MG tablet Take 1 tablet (50 mg) by mouth daily. 90 tablet 1     VITAMIN D3 25 MCG tablet Take 1 tablet by mouth daily       No current facility-administered medications for this visit.       Allergies  Allergies   Allergen Reactions     Other Environmental Allergy Itching     Sulfamethoxazole-Trimethoprim Rash         Family History  family history includes Breast Cancer in her maternal aunt; Diabetes Type 2  in her sister; Hyperlipidemia in her father; Hypertension in her father; Other - See Comments in her sister; Ovarian Cancer (age of onset: 60) in her mother.    Social History  Social History     Tobacco Use     Smoking status: Never     Passive exposure: Never     Smokeless tobacco: Never   Substance Use Topics     Alcohol use: Never       Physical Exam  /81 (BP Location: Left arm)   Pulse 63   Wt 67.8 kg (149 lb 6.4 oz)   SpO2 96%   BMI 28.23 kg/m    Body mass index is 28.23 kg/m .  GENERAL :  In no apparent distress  EYES: No scleral icterus,  No proptosis  NECK: No visible masses.   RESP: Normal breathing  NEURO: awake, alert, responds appropriately to questions.    Ext; No swelling    DATA REVIEW  Labs/Imaging   Latest Reference Range & Units 11/23/24 08:50   Cortisol, Dexamethasone <1.8 ug/dL 2.9 (H)   (H): Data is abnormally high   Latest Reference Range & Units 11/30/24 08:45   Cortisol, Dexamethasone <1.8 ug/dL 2.0 (H)   (H): Data is abnormally high        Lab Results   Component Value Date     03/09/2025    POTASSIUM 4.0 03/09/2025    CHLORIDE 103 03/09/2025    CO2 24 03/09/2025    ANIONGAP 11 03/09/2025     (H) 03/09/2025    BUN 29.8 (H) 03/09/2025    CR 0.92 03/09/2025    GFRESTIMATED 72 03/09/2025    MIRNA 9.4  03/09/2025         ASSESSMENT/PLAN:     Geovanna Sepulveda is a 59 year old female with HTN who is here for Follow-up adrenal nodule with MACE and hyperaldosteronism .    ## 2.1 cm right adrenal nodule   ## Primary hyperaldosteronism  ## MACE (+ve 1 mg and 8 mg DST)   ## h/o fallopian tube cleaning in the [facility] 2005  Pt with spontaneous hypokalemia and labs compatible with primary hyperaldosteronism, no need for confirmatory test.  Regarding excess cortisol secretion, both 1 mg and 8 mg dexamethasone suppression are positive.  Today review approach for treatment, for excess cortisol, no medication for direct treatment.  For primary hyperaldosteronism, we have effective oral medication eg. Spironolactone.  AVS in cortisol excess could be inaccurate  Recommend surgical treatment for excess cortisol and if needed, medication for hyperaldosteronism  -- hold off on AVS  -- surgery referral for right adrenalectomy for mild autonomous cortisol excess  -- patient may need steroid replacement after surgery until we prove that contralateral adrenal gland recovered from secondary adrenal insufficiency  -- patient has h/o fallopian tube cleaning 2005, patient will try to get record for the [facility] to determine extent of procedure and intraabdominal involvement  -- continue spironolactone and amlodipine for now  -- labs 1 month    Follow-up 3 months       Orders Placed This Encounter   Procedures     Hemoglobin A1c     Basic metabolic panel     Adult Gen Surg  Referral     The longitudinal plan of care for the diagnosis(es)/condition(s) as documented were addressed during this visit. Due to the added complexity in care, I will continue to support Geovanna in the subsequent management and with ongoing continuity of care.       Antolin Millan MD        Again, thank you for allowing me to participate in the care of your patient.        Sincerely,        Antolin Millan MD    Electronically signed

## 2025-04-09 ENCOUNTER — OFFICE VISIT (OUTPATIENT)
Dept: FAMILY MEDICINE | Facility: CLINIC | Age: 60
End: 2025-04-09
Payer: COMMERCIAL

## 2025-04-09 VITALS
BODY MASS INDEX: 27.75 KG/M2 | RESPIRATION RATE: 18 BRPM | HEART RATE: 70 BPM | HEIGHT: 61 IN | WEIGHT: 147 LBS | OXYGEN SATURATION: 97 % | DIASTOLIC BLOOD PRESSURE: 68 MMHG | TEMPERATURE: 97.8 F | SYSTOLIC BLOOD PRESSURE: 115 MMHG

## 2025-04-09 DIAGNOSIS — N80.9 ENDOMETRIOSIS: ICD-10-CM

## 2025-04-09 DIAGNOSIS — Z12.4 CERVICAL CANCER SCREENING: Primary | ICD-10-CM

## 2025-04-09 DIAGNOSIS — E26.9 HYPERALDOSTERONISM: ICD-10-CM

## 2025-04-09 DIAGNOSIS — R73.03 PREDIABETES: ICD-10-CM

## 2025-04-09 DIAGNOSIS — N17.9 ACUTE RENAL FAILURE SUPERIMPOSED ON CHRONIC KIDNEY DISEASE, UNSPECIFIED ACUTE RENAL FAILURE TYPE, UNSPECIFIED CKD STAGE: ICD-10-CM

## 2025-04-09 DIAGNOSIS — I10 ESSENTIAL HYPERTENSION: ICD-10-CM

## 2025-04-09 DIAGNOSIS — G62.9 NEUROPATHY: ICD-10-CM

## 2025-04-09 DIAGNOSIS — N18.9 ACUTE RENAL FAILURE SUPERIMPOSED ON CHRONIC KIDNEY DISEASE, UNSPECIFIED ACUTE RENAL FAILURE TYPE, UNSPECIFIED CKD STAGE: ICD-10-CM

## 2025-04-09 LAB
ANION GAP SERPL CALCULATED.3IONS-SCNC: 11 MMOL/L (ref 7–15)
BUN SERPL-MCNC: 36.1 MG/DL (ref 6–20)
CALCIUM SERPL-MCNC: 10.1 MG/DL (ref 8.8–10.4)
CHLORIDE SERPL-SCNC: 103 MMOL/L (ref 98–107)
CHOLEST SERPL-MCNC: 171 MG/DL
CREAT SERPL-MCNC: 0.93 MG/DL (ref 0.51–0.95)
CREAT UR-MCNC: 73.3 MG/DL
EGFRCR SERPLBLD CKD-EPI 2021: 71 ML/MIN/1.73M2
EST. AVERAGE GLUCOSE BLD GHB EST-MCNC: 117 MG/DL
FASTING STATUS PATIENT QL REPORTED: YES
FASTING STATUS PATIENT QL REPORTED: YES
GLUCOSE SERPL-MCNC: 112 MG/DL (ref 70–99)
HBA1C MFR BLD: 5.7 % (ref 0–5.6)
HCO3 SERPL-SCNC: 23 MMOL/L (ref 22–29)
HDLC SERPL-MCNC: 43 MG/DL
LDLC SERPL CALC-MCNC: 103 MG/DL
MICROALBUMIN UR-MCNC: 24.2 MG/L
MICROALBUMIN/CREAT UR: 33.02 MG/G CR (ref 0–25)
NONHDLC SERPL-MCNC: 128 MG/DL
POTASSIUM SERPL-SCNC: 4.4 MMOL/L (ref 3.4–5.3)
SODIUM SERPL-SCNC: 137 MMOL/L (ref 135–145)
TRIGL SERPL-MCNC: 123 MG/DL

## 2025-04-09 PROCEDURE — 90677 PCV20 VACCINE IM: CPT | Performed by: NURSE PRACTITIONER

## 2025-04-09 PROCEDURE — 80061 LIPID PANEL: CPT | Performed by: NURSE PRACTITIONER

## 2025-04-09 PROCEDURE — 3074F SYST BP LT 130 MM HG: CPT | Performed by: NURSE PRACTITIONER

## 2025-04-09 PROCEDURE — 99214 OFFICE O/P EST MOD 30 MIN: CPT | Mod: 25 | Performed by: NURSE PRACTITIONER

## 2025-04-09 PROCEDURE — 90471 IMMUNIZATION ADMIN: CPT | Performed by: NURSE PRACTITIONER

## 2025-04-09 PROCEDURE — 3078F DIAST BP <80 MM HG: CPT | Performed by: NURSE PRACTITIONER

## 2025-04-09 PROCEDURE — 83036 HEMOGLOBIN GLYCOSYLATED A1C: CPT | Performed by: NURSE PRACTITIONER

## 2025-04-09 PROCEDURE — 80048 BASIC METABOLIC PNL TOTAL CA: CPT | Performed by: NURSE PRACTITIONER

## 2025-04-09 PROCEDURE — 82570 ASSAY OF URINE CREATININE: CPT | Performed by: NURSE PRACTITIONER

## 2025-04-09 PROCEDURE — 82043 UR ALBUMIN QUANTITATIVE: CPT | Performed by: NURSE PRACTITIONER

## 2025-04-09 NOTE — PROGRESS NOTES
Assessment & Plan     Acute renal failure superimposed on chronic kidney disease, unspecified acute renal failure type, unspecified CKD stage  Nl creat but elevated BUN, check albumin today  Pt to avoid NSAIDS  Consider discont Hypertensive medications after Endo procedure for hyperaldosteronism  Consider addition of SGLT2i at follow up post procedure  - Albumin Random Urine Quantitative with Creat Ratio  - Albumin Random Urine Quantitative with Creat Ratio    Cervical cancer screening  Pt says will do at pre op or physical next fall     Endometriosis  Per pt report - asymptomatic    Prediabetes  Discussed lifestyle changes  Diet controlled  Check levels  Resume statin - atorvastatin 40mg  - Lipid panel reflex to direct LDL Fasting  - Hemoglobin A1c  - Lipid panel reflex to direct LDL Fasting    Essential hypertension  BP well controlled on Spironolactone 50mg and amlodipine 10mg  - Basic metabolic panel    Hyperaldosteronism  Cont plan for surgical removal of adrenal gland lesion and close monitoring by endocrine  Pt continue to have neuropathy which is treated well with gabapentin  HTN managed on current medications with nl electrolytes - rechecking these today                   Didi Austin is a 59 year old, presenting for the following health issues:  Hypertension      4/9/2025     8:10 AM   Additional Questions   Roomed by SADIA PEREZ   Accompanied by SELF     History of Present Illness       CKD: She is not using over the counter pain medicine.     Mental Health Follow-up:  Patient presents to follow-up on Depression.Patient's depression since last visit has been:  Good  The patient is having other symptoms associated with depression.      Any significant life events: other  Patient is not feeling anxious or having panic attacks.  Patient has no concerns about alcohol or drug use.    Hyperlipidemia:  She presents for follow up of hyperlipidemia.   She is taking medication to lower cholesterol. She is not  "having myalgia or other side effects to statin medications.    Hypertension: She presents for follow up of hypertension.  She does check blood pressure  regularly outside of the clinic. Outpatient blood pressures have not been over 140/90. She does not follow a low salt diet.     She eats 4 or more servings of fruits and vegetables daily.She consumes 1 sweetened beverage(s) daily.She exercises with enough effort to increase her heart rate 30 to 60 minutes per day.  She exercises with enough effort to increase her heart rate 4 days per week.   She is taking medications regularly.        Always peeing, thirsty a lot.   Has 3 BMs in morning of formed stool.    Less neuropathy with gabapentin now    Hydroxyzine if she feels itchy at night sometimes     No other vitamins or supplements     No dizziness. No chest pressure.     Sometimes  fesl lonely                       Objective    /68 (BP Location: Right arm, Patient Position: Sitting, Cuff Size: Adult Regular)   Pulse 70   Temp 97.8  F (36.6  C) (Oral)   Resp 18   Ht 1.549 m (5' 1\")   Wt 66.7 kg (147 lb)   SpO2 97%   BMI 27.78 kg/m    Body mass index is 27.78 kg/m .  Physical Exam   GENERAL: alert and no distress  NECK: no adenopathy, no asymmetry, masses, or scars  RESP: lungs clear to auscultation - no rales, rhonchi or wheezes  CV: regular rate and rhythm, normal S1 S2, no S3 or S4, no murmur, click or rub, no peripheral edema  ABDOMEN: soft, nontender, no hepatosplenomegaly, no masses and bowel sounds normal  MS: no gross musculoskeletal defects noted, no edema            Signed Electronically by: GILLES Canseco CNP    "

## 2025-04-09 NOTE — TELEPHONE ENCOUNTER
REFERRAL INFORMATION:  Referring Provider:  Antolin Millan MD   Referring Clinic:  [facility]   Reason for Visit/Diagnosis:   E27.9 (ICD-10-CM) - Adrenal nodule   E24.9 (ICD-10-CM) - Overproduction of cortisol   Please schedule with Dr. Toni Beverly, 2.1 cm right adrenal nodule with excess cortisol, primary hyperaldosteronism        FUTURE VISIT INFORMATION:  Appointment Date: 4/16/25  Appointment Time:      NOTES RECORD STATUS  DETAILS   OFFICE NOTE from Referring Provider Internal 3/26/25   OFFICE NOTE from Other Specialists Internal    PERTINENT LABS Internal    IMAGING (CT, MRI, US, XR)  Internal 10/30/24-CT renal mass     Records Requested    Facility    Fax:    Outcome

## 2025-04-09 NOTE — RESULT ENCOUNTER NOTE
Hello -    Here are my comments about the recent results: A1c improve and in prediabetes range.    Regards,   Antolin Millan MD

## 2025-04-10 RX ORDER — GABAPENTIN 300 MG/1
300 CAPSULE ORAL 3 TIMES DAILY
Qty: 270 CAPSULE | Refills: 1 | Status: SHIPPED | OUTPATIENT
Start: 2025-04-10

## 2025-04-10 NOTE — RESULT ENCOUNTER NOTE
Hello -    Here are my comments about the recent results: kidney function and potassium are normal. Continue current dose of spironolactone.    Regards,   Antolin Millan MD

## 2025-04-11 PROBLEM — E78.5 HYPERLIPIDEMIA LDL GOAL <100: Status: ACTIVE | Noted: 2023-10-18

## 2025-04-16 ENCOUNTER — OFFICE VISIT (OUTPATIENT)
Dept: SURGERY | Facility: CLINIC | Age: 60
End: 2025-04-16
Payer: COMMERCIAL

## 2025-04-16 ENCOUNTER — PRE VISIT (OUTPATIENT)
Dept: SURGERY | Facility: CLINIC | Age: 60
End: 2025-04-16

## 2025-04-16 VITALS
OXYGEN SATURATION: 97 % | DIASTOLIC BLOOD PRESSURE: 75 MMHG | WEIGHT: 148.9 LBS | SYSTOLIC BLOOD PRESSURE: 142 MMHG | BODY MASS INDEX: 28.11 KG/M2 | HEIGHT: 61 IN | HEART RATE: 68 BPM

## 2025-04-16 DIAGNOSIS — E27.9 ADRENAL NODULE: Primary | ICD-10-CM

## 2025-04-16 DIAGNOSIS — D35.01 ADENOMA OF RIGHT ADRENAL GLAND: Primary | ICD-10-CM

## 2025-04-16 PROCEDURE — 3078F DIAST BP <80 MM HG: CPT | Performed by: SURGERY

## 2025-04-16 PROCEDURE — 99203 OFFICE O/P NEW LOW 30 MIN: CPT | Performed by: SURGERY

## 2025-04-16 PROCEDURE — 3077F SYST BP >= 140 MM HG: CPT | Performed by: SURGERY

## 2025-04-16 PROCEDURE — 1126F AMNT PAIN NOTED NONE PRSNT: CPT | Performed by: SURGERY

## 2025-04-16 RX ORDER — CEFAZOLIN SODIUM 2 G/50ML
2 SOLUTION INTRAVENOUS
OUTPATIENT
Start: 2025-04-16

## 2025-04-16 RX ORDER — CEFAZOLIN SODIUM 2 G/50ML
2 SOLUTION INTRAVENOUS SEE ADMIN INSTRUCTIONS
OUTPATIENT
Start: 2025-04-16

## 2025-04-16 ASSESSMENT — PAIN SCALES - GENERAL: PAINLEVEL_OUTOF10: NO PAIN (0)

## 2025-04-16 NOTE — PROGRESS NOTES
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  She has been worked up by her primary care physician and endocrinology and found to have an elevated aldosterone and suppressed renin level.  Given her spontaneous hypokalemia the patient has been deferred for saline suppression testing.  The patient does have a 2.1 cm right adrenal nodule.  As she was being evaluated for possible adrenal vein sampling it became clear that she also had nonsuppression using both a 1 mg and 8 mg dexamethasone suppression test.  In this context with a normal ACTH it likely is that she has cortisol hypersecretion.  For this purpose the patient has been referred for consideration of a right adrenalectomy given that if the left gland is hyper secreting aldosterone that could better be managed by a mineralocorticoid receptor antagonist whereas the outcomes for cortisol suppression on the right side are limited.  The patient does report significant delayed wound healing following carpal tunnel repair.  Further she has a history of prediabetes.    Creatinine   Date Value Ref Range Status   04/09/2025 0.93 0.51 - 0.95 mg/dL Final      Past Medical History:   Diagnosis Date    Hypertension 2012     Past Surgical History:   Procedure Laterality Date    CARPAL TUNNEL RELEASE RT/LT Bilateral 12/2020    [facility]    GYN SURGERY  2005    laparoscopy to clean fallopian tube in the [facility]         Current Outpatient Medications:     albuterol (PROAIR HFA/PROVENTIL HFA/VENTOLIN HFA) 108 (90 Base) MCG/ACT inhaler, Inhale 2 puffs into the lungs every 6 hours as needed for shortness of breath, wheezing or cough., Disp: 18 g, Rfl: 3    amLODIPine (NORVASC) 10 MG tablet, Take 1 tablet (10 mg) by mouth daily., Disp: 90 tablet, Rfl: 1    atorvastatin  "(LIPITOR) 40 MG tablet, Take 1 tablet (40 mg) by mouth daily., Disp: 90 tablet, Rfl: 1    gabapentin (NEURONTIN) 300 MG capsule, Take 1 capsule (300 mg) by mouth 3 times daily., Disp: 270 capsule, Rfl: 1    hydrOXYzine makeda (VISTARIL) 25 MG capsule, Take 1 capsule (25 mg) by mouth nightly as needed., Disp: , Rfl:     spironolactone (ALDACTONE) 50 MG tablet, Take 1 tablet (50 mg) by mouth daily., Disp: 90 tablet, Rfl: 1    VITAMIN D3 25 MCG tablet, Take 1 tablet by mouth daily, Disp: , Rfl:      BP (!) 142/75 (BP Location: Left arm, Patient Position: Sitting, Cuff Size: Adult Regular)   Pulse 68   Ht 1.549 m (5' 1\")   Wt 67.5 kg (148 lb 14.4 oz)   SpO2 97%   BMI 28.13 kg/m       Examination  Patient has some central deposition of fat.      Assessment and Plan  Patient likely has combined aldosterone secretion and hypercortisolism and this would be consistent with the size of the lesion in the right adrenal gland being greater than 1-1/2 cm.  That being said it is probably prudent to remove the right adrenal gland without the need for adrenal vein sampling.  We have recently started an adrenal nodule conference and we will present her tomorrow at this conference.  As we plan for possible right adrenalectomy we will use the posterior approach the patient appears to have a reasonable space to place the instrumentation and given the posterior location of the gland makes this a reasonable target.  She understands the procedure its risks and potential complications and would like us to proceed.      "

## 2025-04-16 NOTE — LETTER
4/16/2025       RE: Geovanna Sepulveda  [address]  [place] [address]     Dear Colleague,    Thank you for referring your patient, Geovanna Sepulveda, to the [facility] SUR[facility]ERY [facility] [place] [facility]. Please see a copy of my visit note below.    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  She has been worked up by her primary care physician and endocrinology and found to have an elevated aldosterone and suppressed renin level.  Given her spontaneous hypokalemia the patient has been deferred for saline suppression testing.  The patient does have a 2.1 cm right adrenal nodule.  As she was being evaluated for possible adrenal vein sampling it became clear that she also had nonsuppression using both a 1 mg and 8 mg dexamethasone suppression test.  In this context with a normal ACTH it likely is that she has cortisol hypersecretion.  For this purpose the patient has been referred for consideration of a right adrenalectomy given that if the left gland is hyper secreting aldosterone that could better be managed by a mineralocorticoid receptor antagonist whereas the outcomes for cortisol suppression on the right side are limited.  The patient does report significant delayed wound healing following carpal tunnel repair.  Further she has a history of prediabetes.    Creatinine   Date Value Ref Range Status   04/09/2025 0.93 0.51 - 0.95 mg/dL Final      Past Medical History:   Diagnosis Date     Hypertension 2012     Past Surgical History:   Procedure Laterality Date     CARPAL TUNNEL RELEASE RT/LT Bilateral 12/2020    [facility]     GYN SURGERY  2005    laparoscopy to clean fallopian tube in the [facility]         Current Outpatient Medications:      " albuterol (PROAIR HFA/PROVENTIL HFA/VENTOLIN HFA) 108 (90 Base) MCG/ACT inhaler, Inhale 2 puffs into the lungs every 6 hours as needed for shortness of breath, wheezing or cough., Disp: 18 g, Rfl: 3     amLODIPine (NORVASC) 10 MG tablet, Take 1 tablet (10 mg) by mouth daily., Disp: 90 tablet, Rfl: 1     atorvastatin (LIPITOR) 40 MG tablet, Take 1 tablet (40 mg) by mouth daily., Disp: 90 tablet, Rfl: 1     gabapentin (NEURONTIN) 300 MG capsule, Take 1 capsule (300 mg) by mouth 3 times daily., Disp: 270 capsule, Rfl: 1     hydrOXYzine makeda (VISTARIL) 25 MG capsule, Take 1 capsule (25 mg) by mouth nightly as needed., Disp: , Rfl:      spironolactone (ALDACTONE) 50 MG tablet, Take 1 tablet (50 mg) by mouth daily., Disp: 90 tablet, Rfl: 1     VITAMIN D3 25 MCG tablet, Take 1 tablet by mouth daily, Disp: , Rfl:      BP (!) 142/75 (BP Location: Left arm, Patient Position: Sitting, Cuff Size: Adult Regular)   Pulse 68   Ht 1.549 m (5' 1\")   Wt 67.5 kg (148 lb 14.4 oz)   SpO2 97%   BMI 28.13 kg/m       Examination  Patient has some central deposition of fat.      Assessment and Plan  Patient likely has combined aldosterone secretion and hypercortisolism and this would be consistent with the size of the lesion in the right adrenal gland being greater than 1-1/2 cm.  That being said it is probably prudent to remove the right adrenal gland without the need for adrenal vein sampling.  We have recently started an adrenal nodule conference and we will present her tomorrow at this conference.  As we plan for possible right adrenalectomy we will use the posterior approach the patient appears to have a reasonable space to place the instrumentation and given the posterior location of the gland makes this a reasonable target.  She understands the procedure its risks and potential complications and would like us to proceed.        Again, thank you for allowing me to participate in the care of your patient.      Sincerely,    Toni " MD [facility]

## 2025-04-16 NOTE — NURSING NOTE
"Chief Complaint   Patient presents with    New Patient     New adrenal       Vitals:    04/16/25 0827   BP: (!) 142/75   BP Location: Left arm   Patient Position: Sitting   Cuff Size: Adult Regular   Pulse: 68   SpO2: 97%   Weight: 67.5 kg (148 lb 14.4 oz)   Height: 1.549 m (5' 1\")       Body mass index is 28.13 kg/m .                          Damon Sanders, EMT    "

## 2025-04-23 ENCOUNTER — TELEPHONE (OUTPATIENT)
Dept: SURGERY | Facility: CLINIC | Age: 60
End: 2025-04-23
Payer: COMMERCIAL

## 2025-04-23 NOTE — TELEPHONE ENCOUNTER
Left VM message asking patient to call me to discuss surgery date as well as schedule PAC appointment. My direct call back number left.

## 2025-04-24 ENCOUNTER — HOSPITAL ENCOUNTER (EMERGENCY)
Facility: CLINIC | Age: 60
Discharge: HOME | End: 2025-04-24
Attending: EMERGENCY MEDICINE
Payer: COMMERCIAL

## 2025-04-24 ENCOUNTER — APPOINTMENT (OUTPATIENT)
Dept: RADIOLOGY | Facility: CLINIC | Age: 60
End: 2025-04-24
Attending: EMERGENCY MEDICINE
Payer: COMMERCIAL

## 2025-04-24 VITALS
BODY MASS INDEX: 28.89 KG/M2 | TEMPERATURE: 98.7 F | RESPIRATION RATE: 20 BRPM | WEIGHT: 153 LBS | HEIGHT: 61 IN | SYSTOLIC BLOOD PRESSURE: 140 MMHG | OXYGEN SATURATION: 97 % | HEART RATE: 62 BPM | DIASTOLIC BLOOD PRESSURE: 63 MMHG

## 2025-04-24 DIAGNOSIS — R07.9 CHEST PAIN, UNSPECIFIED TYPE: ICD-10-CM

## 2025-04-24 LAB
ANION GAP SERPL CALCULATED.3IONS-SCNC: 10 MMOL/L (ref 7–15)
ATRIAL RATE - MUSE: 73 BPM
BASOPHILS # BLD AUTO: 0.1 10E3/UL (ref 0–0.2)
BASOPHILS NFR BLD AUTO: 1 %
BUN SERPL-MCNC: 29 MG/DL (ref 6–20)
CALCIUM SERPL-MCNC: 9.7 MG/DL (ref 8.8–10.4)
CHLORIDE SERPL-SCNC: 103 MMOL/L (ref 98–107)
CREAT SERPL-MCNC: 0.79 MG/DL (ref 0.51–0.95)
D DIMER PPP FEU-MCNC: 0.31 UG/ML FEU (ref 0–0.5)
DIASTOLIC BLOOD PRESSURE - MUSE: 90 MMHG
EGFRCR SERPLBLD CKD-EPI 2021: 86 ML/MIN/1.73M2
EOSINOPHIL # BLD AUTO: 0.4 10E3/UL (ref 0–0.7)
EOSINOPHIL NFR BLD AUTO: 5 %
ERYTHROCYTE [DISTWIDTH] IN BLOOD BY AUTOMATED COUNT: 12.3 % (ref 10–15)
GLUCOSE SERPL-MCNC: 105 MG/DL (ref 70–99)
HCO3 SERPL-SCNC: 25 MMOL/L (ref 22–29)
HCT VFR BLD AUTO: 39.1 % (ref 35–47)
HGB BLD-MCNC: 13.2 G/DL (ref 11.7–15.7)
IMM GRANULOCYTES # BLD: 0 10E3/UL
IMM GRANULOCYTES NFR BLD: 1 %
INTERPRETATION ECG - MUSE: NORMAL
LYMPHOCYTES # BLD AUTO: 2.2 10E3/UL (ref 0.8–5.3)
LYMPHOCYTES NFR BLD AUTO: 25 %
MCH RBC QN AUTO: 29.6 PG (ref 26.5–33)
MCHC RBC AUTO-ENTMCNC: 33.8 G/DL (ref 31.5–36.5)
MCV RBC AUTO: 88 FL (ref 78–100)
MONOCYTES # BLD AUTO: 0.4 10E3/UL (ref 0–1.3)
MONOCYTES NFR BLD AUTO: 5 %
NEUTROPHILS # BLD AUTO: 5.7 10E3/UL (ref 1.6–8.3)
NEUTROPHILS NFR BLD AUTO: 64 %
NRBC # BLD AUTO: 0 10E3/UL
NRBC BLD AUTO-RTO: 0 /100
P AXIS - MUSE: 44 DEGREES
PLATELET # BLD AUTO: 368 10E3/UL (ref 150–450)
POTASSIUM SERPL-SCNC: 4.9 MMOL/L (ref 3.4–5.3)
PR INTERVAL - MUSE: 164 MS
QRS DURATION - MUSE: 86 MS
QT - MUSE: 372 MS
QTC - MUSE: 409 MS
R AXIS - MUSE: 6 DEGREES
RBC # BLD AUTO: 4.46 10E6/UL (ref 3.8–5.2)
SODIUM SERPL-SCNC: 138 MMOL/L (ref 135–145)
SYSTOLIC BLOOD PRESSURE - MUSE: 179 MMHG
T AXIS - MUSE: 29 DEGREES
TROPONIN T SERPL HS-MCNC: 10 NG/L
TROPONIN T SERPL HS-MCNC: 8 NG/L
VENTRICULAR RATE- MUSE: 73 BPM
WBC # BLD AUTO: 8.8 10E3/UL (ref 4–11)

## 2025-04-24 PROCEDURE — 36415 COLL VENOUS BLD VENIPUNCTURE: CPT | Performed by: EMERGENCY MEDICINE

## 2025-04-24 PROCEDURE — 250N000013 HC RX MED GY IP 250 OP 250 PS 637: Performed by: EMERGENCY MEDICINE

## 2025-04-24 PROCEDURE — 84484 ASSAY OF TROPONIN QUANT: CPT | Performed by: EMERGENCY MEDICINE

## 2025-04-24 PROCEDURE — 80048 BASIC METABOLIC PNL TOTAL CA: CPT | Performed by: EMERGENCY MEDICINE

## 2025-04-24 PROCEDURE — 99285 EMERGENCY DEPT VISIT HI MDM: CPT | Mod: 25

## 2025-04-24 PROCEDURE — 85004 AUTOMATED DIFF WBC COUNT: CPT | Performed by: EMERGENCY MEDICINE

## 2025-04-24 PROCEDURE — 71046 X-RAY EXAM CHEST 2 VIEWS: CPT

## 2025-04-24 PROCEDURE — 85379 FIBRIN DEGRADATION QUANT: CPT | Performed by: EMERGENCY MEDICINE

## 2025-04-24 PROCEDURE — 93005 ELECTROCARDIOGRAM TRACING: CPT | Performed by: EMERGENCY MEDICINE

## 2025-04-24 PROCEDURE — 93005 ELECTROCARDIOGRAM TRACING: CPT

## 2025-04-24 RX ORDER — ALBUTEROL SULFATE 90 UG/1
2 INHALANT RESPIRATORY (INHALATION) EVERY 6 HOURS PRN
Qty: 18 G | Refills: 0 | Status: SHIPPED | OUTPATIENT
Start: 2025-04-24

## 2025-04-24 RX ORDER — ACETAMINOPHEN 325 MG/1
650 TABLET ORAL ONCE
Status: COMPLETED | OUTPATIENT
Start: 2025-04-24 | End: 2025-04-24

## 2025-04-24 RX ORDER — ASPIRIN 81 MG/1
162 TABLET, CHEWABLE ORAL ONCE
Status: COMPLETED | OUTPATIENT
Start: 2025-04-24 | End: 2025-04-24

## 2025-04-24 RX ADMIN — ASPIRIN 81 MG CHEWABLE TABLET 162 MG: 81 TABLET CHEWABLE at 10:03

## 2025-04-24 RX ADMIN — ACETAMINOPHEN 650 MG: 325 TABLET, FILM COATED ORAL at 10:04

## 2025-04-24 ASSESSMENT — COLUMBIA-SUICIDE SEVERITY RATING SCALE - C-SSRS
6. HAVE YOU EVER DONE ANYTHING, STARTED TO DO ANYTHING, OR PREPARED TO DO ANYTHING TO END YOUR LIFE?: NO
2. HAVE YOU ACTUALLY HAD ANY THOUGHTS OF KILLING YOURSELF IN THE PAST MONTH?: NO
1. IN THE PAST MONTH, HAVE YOU WISHED YOU WERE DEAD OR WISHED YOU COULD GO TO SLEEP AND NOT WAKE UP?: NO

## 2025-04-24 ASSESSMENT — ACTIVITIES OF DAILY LIVING (ADL)
ADLS_ACUITY_SCORE: 46

## 2025-04-24 NOTE — ED PROVIDER NOTES
"EMERGENCY DEPARTMENT ENCOUNTER      NAME: Geovanna Sepulveda  AGE: 59 year old female  YOB: 1965  MRN: [id number]  EVALUATION DATE & TIME: 2025  9:44 AM    PCP: Sherry Jenkins    ED PROVIDER: Marco Norwood M.D.      Chief Complaint   Patient presents with    Chest Pain         FINAL IMPRESSION:  No diagnosis found.      ED COURSE & MEDICAL DECISION MAKIN:51 AM I met with the patient, obtained history, performed an initial exam, and discussed options and plan for diagnostics and treatment here in the ED.    59 year old female presents to the Emergency Department for evaluation of ***    At the conclusion of the encounter I discussed the results of all of the tests and the disposition. The questions were answered. The patient or family acknowledged understanding and was agreeable with the care plan.       Medical Decision Making  {DID YOU REMEMBER TO DOCUMENT...?:317340}  {ADMIT VS D/C:783865}    MIPS (CTPE, Dental pain, Pearson, Sinusitis, Asthma/COPD, Head Trauma): {ECC [facility] DOCUMENTATION:838749}    SEPSIS: {Sepsis/Stemi/Stroke:589073::\"None\"}            MEDICATIONS GIVEN IN THE EMERGENCY:  Medications   acetaminophen (TYLENOL) tablet 650 mg (650 mg Oral $Given 25 1004)   aspirin (ASA) chewable tablet 162 mg (162 mg Oral $Given 25 1003)       NEW PRESCRIPTIONS STARTED AT TODAY'S ER VISIT  New Prescriptions    No medications on file          =================================================================    HPI    Patient information was obtained from: The patient    Use of : N/A        Geovanna Sepulveda is a 59 year old female with a pertinent history of HTN, pre-diabetes, HLD and chronic neuropathic pain, who presents to this ED for evaluation of chest pain.    The patient reports that this morning at ~9 AM, she started experiencing chest pain accompanied by shortness of breath. She recalls that she woke up this morning at 7:30 AM feeling happy and was watching TV. She " "states that she was singing with her daughter, but then had to leave at 9:05 AM. She was in the dinning room, and suddenly developed pain in her left-sided chest, and indicates that the pain was \"stuck\" in the middle of her chest. She describes this chest pain as \"really painful\", and at the time she was experiencing chest pain, she felt short of breath. Pain worsens with movement. She continued to feel the pain, so she called her  and daughter, and she was advised to go to the  rather than go to work. She endorses heaviness sensation over her left-sided chest along with difficulty breathing with this. When asked where she is having this chest pain, she explains that she has it in the back of her left breast and middle of her chest intermittently. She has had chest pain before, but it has never been as painful as it is now. Denies any alleviating or worsening factors in regards to this pain. She mentions that she has back pain intermittently, but does not have it now.    When she moves, her HTN \"gets worse\". Denies any new leg swelling. No recent cough or fevers. She has not taken any mediations for the pain PTA. She did take all her usual medications PTA.     States that her sister had a \"blockage\" but does not where she had this. She does recall that her sister needed to undergo surgery for this.    Of note, the patient has upcoming surgery on her adrenal glands. No additional complaints or concerns reported at this time.      REVIEW OF SYSTEMS   All systems reviewed and negative except as noted in HPI.    PAST MEDICAL HISTORY:  Past Medical History:   Diagnosis Date    Hypertension 2012       PAST SURGICAL HISTORY:  Past Surgical History:   Procedure Laterality Date    CARPAL TUNNEL RELEASE RT/LT Bilateral 12/2020    [facility]    GYN SURGERY  2005    laparoscopy to clean fallopian tube in the [facility]           CURRENT MEDICATIONS:    No current facility-administered medications for this encounter. "     Current Outpatient Medications   Medication Sig Dispense Refill    albuterol (PROAIR HFA/PROVENTIL HFA/VENTOLIN HFA) 108 (90 Base) MCG/ACT inhaler Inhale 2 puffs into the lungs every 6 hours as needed for shortness of breath, wheezing or cough. 18 g 3    amLODIPine (NORVASC) 10 MG tablet Take 1 tablet (10 mg) by mouth daily. 90 tablet 1    atorvastatin (LIPITOR) 40 MG tablet Take 1 tablet (40 mg) by mouth daily. 90 tablet 1    gabapentin (NEURONTIN) 300 MG capsule Take 1 capsule (300 mg) by mouth 3 times daily. 270 capsule 1    hydrOXYzine makeda (VISTARIL) 25 MG capsule Take 1 capsule (25 mg) by mouth nightly as needed.      spironolactone (ALDACTONE) 50 MG tablet Take 1 tablet (50 mg) by mouth daily. 90 tablet 1    VITAMIN D3 25 MCG tablet Take 1 tablet by mouth daily           ALLERGIES:  Allergies   Allergen Reactions    Other Environmental Allergy Itching    Sulfamethoxazole-Trimethoprim Rash       FAMILY HISTORY:  Family History   Problem Relation Age of Onset    Ovarian Cancer Mother 60         from mets to lungs    Hypertension Father     Hyperlipidemia Father     Other - See Comments Sister         ovarian mass - no CA, had hysterectomy    Diabetes Type 2  Sister     Hyperlipidemia Sister     Coronary Artery Disease Sister         Had stents placed/plaque removed    Breast Cancer Maternal Aunt         treated       SOCIAL HISTORY:   Social History     Socioeconomic History    Marital status:    Tobacco Use    Smoking status: Never     Passive exposure: Never    Smokeless tobacco: Never   Vaping Use    Vaping status: Never Used   Substance and Sexual Activity    Alcohol use: Never    Drug use: Never    Sexual activity: Not Currently     Comment: Not applicable   Other Topics Concern    Parent/sibling w/ CABG, MI or angioplasty before 65F 55M? No     Social Drivers of Health     Financial Resource Strain: Low Risk  (2024)    Financial Resource Strain     Within the past 12 months, have you or  "your family members you live with been unable to get utilities (heat, electricity) when it was really needed?: No   Food Insecurity: Low Risk  (11/1/2024)    Food Insecurity     Within the past 12 months, did you worry that your food would run out before you got money to buy more?: No     Within the past 12 months, did the food you bought just not last and you didn t have money to get more?: No   Transportation Needs: Low Risk  (11/1/2024)    Transportation Needs     Within the past 12 months, has lack of transportation kept you from medical appointments, getting your medicines, non-medical meetings or appointments, work, or from getting things that you need?: No   Physical Activity: Insufficiently Active (11/1/2024)    Exercise Vital Sign     Days of Exercise per Week: 3 days     Minutes of Exercise per Session: 30 min   Stress: No Stress Concern Present (11/1/2024)    [place] [employer] - Occupational Stress Questionnaire     Feeling of Stress : Not at all   Social Connections: Unknown (11/1/2024)    Social Connection and Isolation Panel [NHANES]     Frequency of Social Gatherings with Friends and Family: Once a week   Interpersonal Safety: Low Risk  (11/6/2024)    Interpersonal Safety     Do you feel physically and emotionally safe where you currently live?: Yes     Within the past 12 months, have you been hit, slapped, kicked or otherwise physically hurt by someone?: No     Within the past 12 months, have you been humiliated or emotionally abused in other ways by your partner or ex-partner?: No   Housing Stability: Low Risk  (11/1/2024)    Housing Stability     Do you have housing? : Yes     Are you worried about losing your housing?: No       VITALS:  BP (!) 147/69   Pulse 62   Temp 98.7  F (37.1  C) (Oral)   Resp 18   Ht 1.549 m (5' 1\")   Wt 69.4 kg (153 lb)   SpO2 97%   BMI 28.91 kg/m      PHYSICAL EXAM    Constitutional: Well developed, Well nourished, NAD. ***  HENT: " Normocephalic, Atraumatic. Neck Supple.  Eyes: EOMI, Conjunctiva normal.  Respiratory: Breathing comfortably on room air. Speaks full sentences easily. Lungs clear to ascultation.  Cardiovascular: Normal heart rate, Regular rhythm ***. No peripheral edema.  Abdomen: Soft, ***  Musculoskeletal: Good range of motion in all major joints. No major deformities noted.  Integument: Warm, Dry.  Neurologic: Alert & awake, Normal motor function, Normal sensory function, No focal deficits noted.   Psychiatric: Cooperative. Affect appropriate.     LAB:  All pertinent labs reviewed and interpreted.  Labs Ordered and Resulted from Time of ED Arrival to Time of ED Departure   BASIC METABOLIC PANEL - Abnormal       Result Value    Sodium 138      Potassium 4.9      Chloride 103      Carbon Dioxide (CO2) 25      Anion Gap 10      Urea Nitrogen 29.0 (*)     Creatinine 0.79      GFR Estimate 86      Calcium 9.7      Glucose 105 (*)    TROPONIN T, HIGH SENSITIVITY - Normal    Troponin T, High Sensitivity 10     D DIMER QUANTITATIVE - Normal    D-Dimer Quantitative 0.31     TROPONIN T, HIGH SENSITIVITY - Normal    Troponin T, High Sensitivity 8     CBC WITH PLATELETS AND DIFFERENTIAL    WBC Count 8.8      RBC Count 4.46      Hemoglobin 13.2      Hematocrit 39.1      MCV 88      MCH 29.6      MCHC 33.8      RDW 12.3      Platelet Count 368      % Neutrophils 64      % Lymphocytes 25      % Monocytes 5      % Eosinophils 5      % Basophils 1      % Immature Granulocytes 1      NRBCs per 100 WBC 0      Absolute Neutrophils 5.7      Absolute Lymphocytes 2.2      Absolute Monocytes 0.4      Absolute Eosinophils 0.4      Absolute Basophils 0.1      Absolute Immature Granulocytes 0.0      Absolute NRBCs 0.0         RADIOLOGY:  Reviewed all pertinent imaging. Please see official radiology report.  Chest XR,  PA & LAT   Final Result   IMPRESSION: Negative chest.          EKG:    Performed at: 09:49:42    Impression: Sinus rhythm. Normal  ECG.    Rate: 73 BPM  Rhythm: Sinus  Axis: 6  IN Interval: 164 ms  QRS Interval: 86 ms  QTc Interval: 409 ms  ST Changes: None  Comparison: No previous ECGs available.    I have independently reviewed and interpreted the EKG(s) documented above.    PROCEDURES:   N/A      I, Julia Reyes, am serving as a scribe to document services personally performed by Dr. Marco Norwood, based on my observation and the provider's statements to me. I, Marco Norwood MD attest that Julia Reyes is acting in a scribe capacity, has observed my performance of the services and has documented them in accordance with my direction.    Marco Norwood M.D.  Emergency Medicine  [facility] EMERGENCY ROOM  [address] [address] [phone]  Dept: [phone]     None  Comparison: No previous ECGs available.    I have independently reviewed and interpreted the EKG(s) documented above.    PROCEDURES:   N/A      I, Julia Reyes, am serving as a scribe to document services personally performed by Dr. Marco Norwood, based on my observation and the provider's statements to me. I, Marco Norwood MD attest that Julia Reyes is acting in a scribe capacity, has observed my performance of the services and has documented them in accordance with my direction.    Marco Norwood M.D.  Emergency Medicine  [facility] EMERGENCY ROOM  [address] [address]  [phone]  Dept: [phone]       Marco Norwood MD  04/25/25 0619

## 2025-04-24 NOTE — ED TRIAGE NOTES
Pt c/o CP and SOB starting this morning at approx 9am. Worse when walking. Improved when resting. Possibly with some dizziness. Denies any nausea.     Triage Assessment (Adult)       Row Name 04/24/25 0950          Triage Assessment    Airway WDL WDL        Respiratory WDL    Respiratory WDL X;rhythm/pattern     Rhythm/Pattern, Respiratory shortness of breath        Skin Circulation/Temperature WDL    Skin Circulation/Temperature WDL WDL        Cardiac WDL    Cardiac WDL X;chest pain        Chest Pain Assessment    Chest Pain Location anterior chest, left;midsternal     Character pressure;sharp     Duration 9am     Precipitating Factors activity     Alleviating Factors rest     Associated Signs/Symptoms dyspnea;hypertension;dizziness     Chest Pain Intervention 12-lead ECG obtained;cardiac monitor placed;cardiac monitoring continued        Peripheral/Neurovascular WDL    Peripheral Neurovascular WDL WDL        Cognitive/Neuro/Behavioral WDL    Cognitive/Neuro/Behavioral WDL WDL

## 2025-04-24 NOTE — DISCHARGE INSTRUCTIONS
You were seen in the emergency department for chest pain.  Your evaluation included reassuring EKG and lab tests which did not show any signs of heart attack, blood clotting, or other serious or life-threatening cause of the symptoms.  Your chest x-ray also look clear.  You can continue to use Tylenol 650 mg every 6 hours for general pain relief.  Please stick to a very bland diet avoiding spicy foods fatty foods and alcohol.  We are printing a prescription for an inhaler refill if you need to use this for shortness of breath.  Please follow-up in your clinic after this ED visit to review any ongoing symptoms.

## 2025-04-29 ENCOUNTER — CARE COORDINATION (OUTPATIENT)
Dept: ENDOCRINOLOGY | Facility: CLINIC | Age: 60
End: 2025-04-29
Payer: COMMERCIAL

## 2025-04-29 NOTE — LETTER
4/29/2025       RE: Geovanna Sepulveda  [address]  [place] [address]     To whom it may concern,    Geovanna Sepulveda is under my care. She will need to be excused from work 5/22/25 thru 6/22/25.   She may return to work without restrictions 6/23/25.    Please feel free to contact my office if you have any questions.    Sincerely,    Toni Beverly MD  Department of Surgery  [phone]    rr

## 2025-04-30 ENCOUNTER — CARE COORDINATION (OUTPATIENT)
Dept: ENDOCRINOLOGY | Facility: CLINIC | Age: 60
End: 2025-04-30
Payer: COMMERCIAL

## 2025-04-30 NOTE — PROGRESS NOTES
Patient is planning to drop her [place] paperwork off at the clinic. She would like a completed copy mailed to her.

## 2025-05-01 ENCOUNTER — TELEPHONE (OUTPATIENT)
Dept: SURGERY | Facility: CLINIC | Age: 60
End: 2025-05-01
Payer: COMMERCIAL

## 2025-05-01 NOTE — TELEPHONE ENCOUNTER
FUTURE VISIT INFORMATION      SURGERY INFORMATION:  Date: 2025   Location: [facility] OR   Surgeon:  Toni Beverly MD   Anesthesia Type:  General   Procedure: ADRENALECTOMY, LAPAROSCOPIC, posterior approach   Consult: 25    RECORDS REQUESTED FROM:       Primary Care Provider: Sherry Jenkins APRN CNP - [facility]    Pertinent Medical History: Adrenal nodule; Hypertension; Hypokelemia; Prediabetes; Hyperlipidemia LDL goal <100; Hyperaldosteronism; Renal cyst; Family history of ovarian cancer; Endometriosis;     Most recent EKG+ Tracin25

## 2025-05-01 NOTE — TELEPHONE ENCOUNTER
Left VM message asking patient to call me to schedule PAC appointment, adrenalectomy scheduled 5/22 with Dr. Beverly. My direct call back number left (left previous message on 4/23).

## 2025-05-06 LAB
ABO + RH BLD: NORMAL
BLD GP AB SCN SERPL QL: NEGATIVE
SPECIMEN EXP DATE BLD: NORMAL

## 2025-05-07 ENCOUNTER — ANESTHESIA EVENT (OUTPATIENT)
Dept: SURGERY | Facility: CLINIC | Age: 60
End: 2025-05-07
Payer: COMMERCIAL

## 2025-05-07 ENCOUNTER — LAB (OUTPATIENT)
Dept: LAB | Facility: CLINIC | Age: 60
End: 2025-05-07
Payer: COMMERCIAL

## 2025-05-07 ENCOUNTER — OFFICE VISIT (OUTPATIENT)
Dept: SURGERY | Facility: CLINIC | Age: 60
End: 2025-05-07
Payer: COMMERCIAL

## 2025-05-07 ENCOUNTER — APPOINTMENT (OUTPATIENT)
Dept: LAB | Facility: CLINIC | Age: 60
End: 2025-05-07
Payer: COMMERCIAL

## 2025-05-07 ENCOUNTER — PRE VISIT (OUTPATIENT)
Dept: SURGERY | Facility: CLINIC | Age: 60
End: 2025-05-07

## 2025-05-07 ENCOUNTER — RESULTS FOLLOW-UP (OUTPATIENT)
Dept: SURGERY | Facility: CLINIC | Age: 60
End: 2025-05-07

## 2025-05-07 VITALS
WEIGHT: 149.6 LBS | HEART RATE: 66 BPM | RESPIRATION RATE: 16 BRPM | BODY MASS INDEX: 28.25 KG/M2 | OXYGEN SATURATION: 96 % | TEMPERATURE: 98.2 F | HEIGHT: 61 IN | DIASTOLIC BLOOD PRESSURE: 79 MMHG | SYSTOLIC BLOOD PRESSURE: 128 MMHG

## 2025-05-07 DIAGNOSIS — E27.9 ADRENAL NODULE: ICD-10-CM

## 2025-05-07 DIAGNOSIS — Z01.818 PREOP EXAMINATION: Primary | ICD-10-CM

## 2025-05-07 LAB
ANION GAP SERPL CALCULATED.3IONS-SCNC: 9 MMOL/L (ref 7–15)
BUN SERPL-MCNC: 32.5 MG/DL (ref 6–20)
CALCIUM SERPL-MCNC: 10 MG/DL (ref 8.8–10.4)
CHLORIDE SERPL-SCNC: 104 MMOL/L (ref 98–107)
CREAT SERPL-MCNC: 0.96 MG/DL (ref 0.51–0.95)
EGFRCR SERPLBLD CKD-EPI 2021: 68 ML/MIN/1.73M2
ERYTHROCYTE [DISTWIDTH] IN BLOOD BY AUTOMATED COUNT: 12.1 % (ref 10–15)
GLUCOSE SERPL-MCNC: 107 MG/DL (ref 70–99)
HCO3 SERPL-SCNC: 25 MMOL/L (ref 22–29)
HCT VFR BLD AUTO: 38.6 % (ref 35–47)
HGB BLD-MCNC: 12.9 G/DL (ref 11.7–15.7)
MCH RBC QN AUTO: 29.6 PG (ref 26.5–33)
MCHC RBC AUTO-ENTMCNC: 33.4 G/DL (ref 31.5–36.5)
MCV RBC AUTO: 89 FL (ref 78–100)
PLATELET # BLD AUTO: 333 10E3/UL (ref 150–450)
POTASSIUM SERPL-SCNC: 4.4 MMOL/L (ref 3.4–5.3)
RBC # BLD AUTO: 4.36 10E6/UL (ref 3.8–5.2)
SODIUM SERPL-SCNC: 138 MMOL/L (ref 135–145)
WBC # BLD AUTO: 7.8 10E3/UL (ref 4–11)

## 2025-05-07 PROCEDURE — 99203 OFFICE O/P NEW LOW 30 MIN: CPT | Performed by: NURSE PRACTITIONER

## 2025-05-07 PROCEDURE — 85027 COMPLETE CBC AUTOMATED: CPT | Performed by: PATHOLOGY

## 2025-05-07 PROCEDURE — 86900 BLOOD TYPING SEROLOGIC ABO: CPT

## 2025-05-07 PROCEDURE — 80048 BASIC METABOLIC PNL TOTAL CA: CPT | Performed by: PATHOLOGY

## 2025-05-07 PROCEDURE — 36415 COLL VENOUS BLD VENIPUNCTURE: CPT | Performed by: PATHOLOGY

## 2025-05-07 ASSESSMENT — ENCOUNTER SYMPTOMS: SEIZURES: 0

## 2025-05-07 ASSESSMENT — LIFESTYLE VARIABLES: TOBACCO_USE: 0

## 2025-05-07 ASSESSMENT — PAIN SCALES - GENERAL: PAINLEVEL_OUTOF10: NO PAIN (0)

## 2025-05-07 NOTE — PATIENT INSTRUCTIONS
Preparing for Your Surgery      Name:  Geovanna Sepulveda   MRN:  [id number]   :  1965   Today's Date:  2025     The [place] [employer] Construction Project                                Timeline 2025 -2025    This project will affect travel to the [facility] and [facility], as well as the [facility].      Please check the [facility] [address] project website for the most up to date information and give yourself additional time to reach your destination.        Arriving for surgery:  Surgery date:  25  Arrival time:  5:30 am  Surgery time: 7:30 am    Please come to:     Please come to:       [facility]    [address] [place]  [address]     The [facility] ([facility] [place] Patient/Visitor Ramp is at [address]. Patients and visitors who self-park will receive the reduced hospital parking rate. If the Patient /Visitor Ramp is full, please follow the signs to the [employer] car park located at the [facility] entrance.      [employer] parking is available (24 hours/ 7 days a week)      Discounted parking pass options are available for patients and visitors. They can be purchased at the [employer] desk at the [facility] entrance.     -    Stop at the security desk and they will direct surgery patients to the Surgery Check in and Family Lounge. [phone]        - If you need directions, a wheelchair or an escort please stop at the Information/security desk in the lobby.     What can I eat or drink?  -  You may eat and drink normally up to 8 hours prior to arrival time. (Until 9:30 pm on 25)  -  You may have clear liquids until 2 hours prior to arrival time. (Until 3:30 am on 25)    Examples of clear liquids:  Water  Clear broth  Juices (apple, white grape, white cranberry  and cider) without pulp  Noncarbonated,  powder based beverages  (lemonade and Joe-Aid)  Sodas (Sprite, 7-Up, ginger ale and seltzer)  Coffee or tea (without milk or cream)  Gatorade    -  No Alcohol or cannabis products for at least 24 hours before surgery.     Which medicines can I take?    Hold Ibuprofen (Advil, Motrin) for 1 day(s) before surgery--unless otherwise directed by surgeon.  Hold Naproxen (Aleve) for 4 days before surgery.    -  DO NOT take these medications the day of surgery:   Spironolactone   Vitamin D      -  PLEASE TAKE these medications the day of surgery:   Albuterol inhaler as needed   Amlodipine (Norvasc)   Atorvastatin (Lipitor)   Gabapentin (Neurontin)       How do I prepare myself?  - Please take 2 showers (one the night prior to surgery and one the morning of surgery) using Scrubcare or Hibiclens soap.    Use this soap only from the neck to your toes. Avoid genital area      Leave the soap on your skin for one minute--then rinse thoroughly.      You may use your own shampoo and conditioner. No other hair products.   - Please remove all jewelry and body piercings.  - No lotions, deodorants or fragrance.  - No makeup or fingernail polish.   - Bring your ID and insurance card.    -For patients being admitted to the [facility]  Family members are to take the patient belongings with them and place them in the lockers provided in the Family Lounge.  Please limit the items you bring to 1 bag as the lockers are small.      -If you use a CPAP machine, please bring the CPAP machine, tubing, and mask to hospital.    -If you have a Deep Brain Stimulator, Spinal Cord Stimulator, or any Neuro Stimulator device---you must bring the remote control to the hospital.      Covid testing policy as of 12/06/2022  Your surgeon will notify and schedule you for a COVID test if one is needed before surgery--please direct any questions or COVID symptoms to your surgeon      Questions or Concerns:    - For any questions regarding the day of  surgery or your hospital stay, please contact the Pre Admission Nursing Office at [phone].       - If you have health changes between today and your surgery, please call your surgeon.       - For questions after surgery, please call your surgeons office.           Current Visitor Guidelines    2 adult visitors for adult patients in the pre op area    If additional visitors come (beyond a patient care attendant or a group home caregiver), the additional visitors will be asked to wait in the main lobby of the hospital    Visiting hours: 8 a.m. to 8:30 p.m.    Patients confirmed or suspected to have symptoms of COVID 19 or flu:     No visitors allowed for adult patients.   Children (under age 18) can have 1 named visitor.     People who are sick or showing symptoms of COVID 19 or flu:    Are not allowed to visit patients--we can only make exceptions in special situations.       Please follow these guidelines for your visit:          Please maintain social distance          Masking is optional--however at times you may be asked to wear a mask for the safety of yourself and others     Clean your hands with alcohol hand . Do this when you arrive at and leave the building and patient room,    And again after you touch your mask or anything in the room.     Go directly to and from the room you are visiting.     Stay in the patient s room during your visit. Limit going to other places in the hospital as much as possible     Leave bags and jackets at home or in the car.     For everyone s health, please don t come and go during your visit. That includes for smoking   during your visit.

## 2025-05-07 NOTE — H&P
Pre-Operative H & P     CC:  Preoperative exam to assess for increased cardiopulmonary risk while undergoing surgery and anesthesia.    Date of Encounter: 5/7/2025  Primary Care Physician:  Sherry Jenkins     Reason for visit:   Encounter Diagnoses   Name Primary?    Preop examination Yes    Adrenal nodule        LAURA  Geovanna Sepulveda is a 59 year old female who presents for pre-operative H & P in preparation for  Procedure Information       Case: [id number] Date/Time: 05/22/25 0730    Procedure: ADRENALECTOMY, LAPAROSCOPIC, posterior approach (Right: Abdomen)    Anesthesia type: General    Diagnosis: Adrenal nodule [E27.9]    Pre-op diagnosis: Adrenal nodule [E27.9]    Location:  OR  /  OR    Providers: Toni Beverly MD            Geovanna Sepulveda is a 58 yo female seen by Dr. Beverly in surgical consultation for adrenal nodule.  Per record review, the patient has a 10-year history of significant hypertension and a history of spontaneous hypokalemia.  She has been worked up by her primary care physician and endocrinology and found to have an elevated aldosterone and suppressed renin level.  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  Dr. Beverly counseled the patient of the findings and treatment options.  The patient has now been scheduled for the procedure as listed above.      The patient presents to the PAC in person today in preparation for the above scheduled procedure with comorbid conditions including HTN, HLD, prediabetes and chronic pain.     Please see Dr. Beverly's note for further information.     History is obtained from the patient and chart review    Hx of abnormal bleeding or anti-platelet use: denies     Menstrual history: No LMP recorded. Patient is postmenopausal.:      Past Medical History  Past Medical History:   Diagnosis Date    Hypertension 2012       Past Surgical  History  Past Surgical History:   Procedure Laterality Date    CARPAL TUNNEL RELEASE RT/LT Bilateral 12/2020    [place] [facility]    GYN SURGERY  2005    laparoscopy to clean fallopian tube in the [facility]       Prior to Admission Medications  Current Outpatient Medications   Medication Sig Dispense Refill    albuterol (PROAIR HFA/PROVENTIL HFA/VENTOLIN HFA) 108 (90 Base) MCG/ACT inhaler Inhale 2 puffs into the lungs every 6 hours as needed for shortness of breath or wheezing. 18 g 0    albuterol (PROAIR HFA/PROVENTIL HFA/VENTOLIN HFA) 108 (90 Base) MCG/ACT inhaler Inhale 2 puffs into the lungs every 6 hours as needed for shortness of breath, wheezing or cough. 18 g 3    amLODIPine (NORVASC) 10 MG tablet Take 1 tablet (10 mg) by mouth daily. (Patient taking differently: Take 10 mg by mouth every morning.) 90 tablet 1    atorvastatin (LIPITOR) 40 MG tablet Take 1 tablet (40 mg) by mouth daily. (Patient taking differently: Take 40 mg by mouth every morning.) 90 tablet 1    gabapentin (NEURONTIN) 300 MG capsule Take 1 capsule (300 mg) by mouth 3 times daily. 270 capsule 1    hydrOXYzine makeda (VISTARIL) 25 MG capsule Take 1 capsule (25 mg) by mouth nightly as needed.      spironolactone (ALDACTONE) 50 MG tablet Take 1 tablet (50 mg) by mouth daily. (Patient taking differently: Take 50 mg by mouth every morning.) 90 tablet 1    VITAMIN D3 25 MCG tablet Take 1 tablet by mouth daily         Allergies  Allergies   Allergen Reactions    Other Environmental Allergy Itching    Sulfamethoxazole-Trimethoprim Rash       Social History  Social History     Socioeconomic History    Marital status:      Spouse name: Not on file    Number of children: Not on file    Years of education: Not on file    Highest education level: Not on file   Occupational History    Not on file   Tobacco Use    Smoking status: Never     Passive exposure: Never    Smokeless tobacco: Never   Vaping Use    Vaping status: Never Used   Substance and Sexual  Activity    Alcohol use: Never    Drug use: Never    Sexual activity: Not Currently     Comment: Not applicable   Other Topics Concern    Parent/sibling w/ CABG, MI or angioplasty before 65F 55M? No   Social History Narrative    Not on file     Social Drivers of Health     Financial Resource Strain: Low Risk  (11/1/2024)    Financial Resource Strain     Within the past 12 months, have you or your family members you live with been unable to get utilities (heat, electricity) when it was really needed?: No   Food Insecurity: Low Risk  (11/1/2024)    Food Insecurity     Within the past 12 months, did you worry that your food would run out before you got money to buy more?: No     Within the past 12 months, did the food you bought just not last and you didn t have money to get more?: No   Transportation Needs: Low Risk  (11/1/2024)    Transportation Needs     Within the past 12 months, has lack of transportation kept you from medical appointments, getting your medicines, non-medical meetings or appointments, work, or from getting things that you need?: No   Physical Activity: Insufficiently Active (11/1/2024)    Exercise Vital Sign     Days of Exercise per Week: 3 days     Minutes of Exercise per Session: 30 min   Stress: No Stress Concern Present (11/1/2024)    [employer] - Occupational Stress Questionnaire     Feeling of Stress : Not at all   Social Connections: Unknown (11/1/2024)    Social Connection and Isolation Panel [NHANES]     Frequency of Communication with Friends and Family: Not on file     Frequency of Social Gatherings with Friends and Family: Once a week     Attends Sikh Services: Not on file     Active Member of Clubs or Organizations: Not on file     Attends Club or Organization Meetings: Not on file     Marital Status: Not on file   Interpersonal Safety: Low Risk  (11/6/2024)    Interpersonal Safety     Do you feel physically and emotionally safe where you currently  live?: Yes     Within the past 12 months, have you been hit, slapped, kicked or otherwise physically hurt by someone?: No     Within the past 12 months, have you been humiliated or emotionally abused in other ways by your partner or ex-partner?: No   Housing Stability: Low Risk  (2024)    Housing Stability     Do you have housing? : Yes     Are you worried about losing your housing?: No       Family History  Family History   Problem Relation Age of Onset    Ovarian Cancer Mother 60         from mets to lungs    Hypertension Father     Hyperlipidemia Father     Other - See Comments Sister         ovarian mass - no CA, had hysterectomy    Diabetes Type 2  Sister     Hyperlipidemia Sister     Coronary Artery Disease Sister         Had stents placed/plaque removed    Breast Cancer Maternal Aunt         treated       Review of Systems  The complete review of systems is negative other than noted in the HPI or here.   Anesthesia Evaluation   Pt has had prior anesthetic. Type: Regional and General.    History of anesthetic complications  - .  Woke early for laproscopy surgery..    ROS/MED HX  ENT/Pulmonary:     (+)     LIVIA risk factors,  hypertension,              Intermittent, asthma  Treatment: Inhaler prn,              (-) tobacco use   Neurologic:     (+)    peripheral neuropathy (lower extremities),                         (-) no seizures, no CVA and migraines   Cardiovascular: Comment: 25: evaluated in ED for unspecified chest pain. Through the evaluation with testing, was determined to not be cardiac and perhaps more GI symptoms. Please see ED physician's note from 25 for further information.       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    Hematologic:    (-) history of blood clots, anemia and history of blood transfusion   Musculoskeletal:       GI/Hepatic:    (-) GERD and liver disease   Renal/Genitourinary:     (+) renal disease (History of TRAM.  Now creatinine and GFR have normalized.), type: ARF,            Endo: Comment:  2.1 cm right adrenal nodule and  likely has combined aldosterone secretion and hypercortisolism.     Prediabetes>>previously on metformin.  Changed diet and now A1C has normalized.    (-) Type I DM, Type II DM, thyroid disease, chronic steroid usage and obesity   Psychiatric/Substance Use:     (+) psychiatric history (stable) depression       Infectious Disease:  - neg infectious disease ROS     Malignancy:  - neg malignancy ROS     Other:  - neg other ROS    (+)  , H/O Chronic Pain (Low back pain down radiating down to legs.),         /79 (BP Location: Right arm, Patient Position: Sitting, Cuff Size: Adult Regular)   Pulse 66   Temp 98.2  F (36.8  C) (Oral)   Resp 16   Ht 1.549 m (5' 1\")   Wt 67.9 kg (149 lb 9.6 oz)   SpO2 96%   BMI 28.27 kg/m      Physical Exam   Constitutional: Awake, alert, cooperative, no apparent distress, and appears stated age.  Eyes: Pupils equal, round and reactive to light, extra ocular muscles intact, sclera clear, conjunctiva normal.  HENT: Normocephalic, oral pharynx with moist mucus membranes, dentition intact with missing a couple bottom molars. No goiter appreciated.   Respiratory: Clear to auscultation bilaterally, no crackles or wheezing.  Cardiovascular: Regular rate and rhythm, normal S1 and S2, and no murmur noted.  Carotids +2, no bruits. No edema. Palpable pulses to radial  DP and PT arteries.   GI: Normal bowel sounds, soft, non-distended, non-tender, " no masses palpated, no hepatosplenomegaly.    Lymph/Hematologic: No cervical lymphadenopathy and no supraclavicular lymphadenopathy.  Skin: Warm and dry.    Musculoskeletal: Full ROM of neck. There is no redness, warmth, or swelling of the joints. Gross motor strength is normal.    Neurologic: Awake, alert, oriented to name, place and time. Cranial nerves II-XII are grossly intact. Gait is normal.   Neuropsychiatric: Calm, cooperative. Normal affect.     Prior Labs/Diagnostic Studies   All labs and imaging personally reviewed    Latest Reference Range & Units 04/24/25 10:16   Sodium 135 - 145 mmol/L 138   Potassium 3.4 - 5.3 mmol/L 4.9   Chloride 98 - 107 mmol/L 103   Carbon Dioxide (CO2) 22 - 29 mmol/L 25   Urea Nitrogen 6.0 - 20.0 mg/dL 29.0 (H)   Creatinine 0.51 - 0.95 mg/dL 0.79   GFR Estimate >60 mL/min/1.73m2 86   Calcium 8.8 - 10.4 mg/dL 9.7   Anion Gap 7 - 15 mmol/L 10   Glucose 70 - 99 mg/dL 105 (H)   WBC 4.0 - 11.0 10e3/uL 8.8   Hemoglobin 11.7 - 15.7 g/dL 13.2   Hematocrit 35.0 - 47.0 % 39.1   Platelet Count 150 - 450 10e3/uL 368   RBC Count 3.80 - 5.20 10e6/uL 4.46   MCV 78 - 100 fL 88   MCH 26.5 - 33.0 pg 29.6   MCHC 31.5 - 36.5 g/dL 33.8   RDW 10.0 - 15.0 % 12.3   % Neutrophils % 64   % Lymphocytes % 25   % Monocytes % 5   % Eosinophils % 5   % Basophils % 1   % Immature Granulocytes % 1   NRBC/W <1 /100 0   Absolute Neutrophil 1.6 - 8.3 10e3/uL 5.7   Absolute Lymphocytes 0.8 - 5.3 10e3/uL 2.2   Absolute Monocytes 0.0 - 1.3 10e3/uL 0.4   Absolute Eosinophils 0.0 - 0.7 10e3/uL 0.4   Absolute Basophils 0.0 - 0.2 10e3/uL 0.1   Absolute Immature Granulocytes <=0.4 10e3/uL 0.0   Absolute NRBCs 10e3/uL 0.0   D-Dimer Quantitative 0.00 - 0.50 ug/mL FEU 0.31   (H): Data is abnormally high  EKG/ stress test - if available please see in ROS above   No results found.       No data to display                  The patient's records and results personally reviewed by this provider.     Outside records reviewed from:  Care Everywhere    LAB/DIAGNOSTIC STUDIES TODAY:     Latest Reference Range & Units 05/07/25 08:58   Sodium 135 - 145 mmol/L 138   Potassium 3.4 - 5.3 mmol/L 4.4   Chloride 98 - 107 mmol/L 104   Carbon Dioxide (CO2) 22 - 29 mmol/L 25   Urea Nitrogen 6.0 - 20.0 mg/dL 32.5 (H)   Creatinine 0.51 - 0.95 mg/dL 0.96 (H)   GFR Estimate >60 mL/min/1.73m2 68   Calcium 8.8 - 10.4 mg/dL 10.0   Anion Gap 7 - 15 mmol/L 9   Glucose 70 - 99 mg/dL 107 (H)   WBC 4.0 - 11.0 10e3/uL 7.8   Hemoglobin 11.7 - 15.7 g/dL 12.9   Hematocrit 35.0 - 47.0 % 38.6   Platelet Count 150 - 450 10e3/uL 333   RBC Count 3.80 - 5.20 10e6/uL 4.36   MCV 78 - 100 fL 89   MCH 26.5 - 33.0 pg 29.6   MCHC 31.5 - 36.5 g/dL 33.4   RDW 10.0 - 15.0 % 12.1   (H): Data is abnormally high    Assessment    Geovanna Sepulveda is a 59 year old female seen as a PAC referral for risk assessment and optimization for anesthesia.    Plan/Recommendations  Pt will be optimized for the proposed procedure.  See below for details on the assessment, risk, and preoperative recommendations    NEUROLOGY  - No history of TIA, CVA or seizure    - Chronic Pain/neuropathy pain in lower extremities  Reports well controlled with gabapentin  Continue gabapentin DOS     -Post Op delirium risk factors:  No risk identified    ENT  - No current airway concerns.  Will need to be reassessed day of surgery.  Mallampati: II  TM: > 3    CARDIAC  - No history of CAD and Afib    - HTN   Managed with amlodipine    - HLD  Managed with statin     - METS (Metabolic Equivalents)  Patient performs 4 or more METS exercise without symptoms             Total Score: 0      - RCRI-Very low risk: Class 1 0.4% complication rate             Total Score: 0        PULMONARY  - LIVIA Low Risk             Total Score: 2    LIVIA: Hypertension    LIVIA: Over 50 ys old      - Asthma  Well controlled  Denies recent exacerbation  Bring inhaler with DOS     - Tobacco History    History   Smoking Status    Never   Smokeless Tobacco  "   Never       GI  - Recently evaluated in ED for unspecified chest pain.  Ruled out for cardiac etiology  Thought to be GI related   No ongoing symptoms  Please see ED note for further information    - PONV High Risk  Total Score: 3           1 AN PONV: Pt is Female    1 AN PONV: Patient is not a current smoker    1 AN PONV: Intended Post Op Opioids        /RENAL  - Baseline Creatinine  see above     ENDOCRINE    - 2.1 cm right adrenal nodule and Primary hyperaldosteronism  Evaluated by Dr. Millan in endocrine and Dr. Lim in surgical consultation   ~ please see their notes for further information   Spironolactone  Above surgery scheduled    - BMI: Estimated body mass index is 28.27 kg/m  as calculated from the following:    Height as of this encounter: 1.549 m (5' 1\").    Weight as of this encounter: 67.9 kg (149 lb 9.6 oz).  Overweight (BMI 25.0-29.9)    - h/o prediabetes  Previously on metformin  Changed diet and now A1C 5.7    HEME  VTE Low Risk 0.26%             Total Score: 0      - No history of abnormal bleeding or antiplatelet use.    - Denies a h/o anemia or previous blood transfusion      MSK  Patient is NOT Frail             Total Score: 2    Frailty: Increased exhaustion    Frailty: Overall lack of energy        PSYCH  - Anxiety  Hydroxyzine      Different anesthesia methods/types have been discussed with the patient, but they are aware that the final plan will be decided by the assigned anesthesia provider on the date of service.      The patient is optimized for their procedure. AVS with information on surgery time/arrival time, meds and NPO status given by nursing staff. No further diagnostic testing indicated.      On the day of service:     Prep time: 10 minutes  Visit time: 20 minutes  Documentation time: 11 minutes  ------------------------------------------  Total time: 41 minutes      GILLES Gerber CNP  Preoperative Assessment Center  [facility]  Clinic " and Surgery Center  Phone: [phone]  Fax: [phone]

## 2025-05-21 RX ORDER — DEXAMETHASONE SODIUM PHOSPHATE 4 MG/ML
4 INJECTION, SOLUTION INTRA-ARTICULAR; INTRALESIONAL; INTRAMUSCULAR; INTRAVENOUS; SOFT TISSUE
Status: CANCELLED | OUTPATIENT
Start: 2025-05-21

## 2025-05-21 RX ORDER — ONDANSETRON 2 MG/ML
4 INJECTION INTRAMUSCULAR; INTRAVENOUS EVERY 30 MIN PRN
Status: CANCELLED | OUTPATIENT
Start: 2025-05-21

## 2025-05-21 RX ORDER — ONDANSETRON 4 MG/1
4 TABLET, ORALLY DISINTEGRATING ORAL EVERY 30 MIN PRN
Status: CANCELLED | OUTPATIENT
Start: 2025-05-21

## 2025-05-21 RX ORDER — NALOXONE HYDROCHLORIDE 0.4 MG/ML
0.1 INJECTION, SOLUTION INTRAMUSCULAR; INTRAVENOUS; SUBCUTANEOUS
Status: CANCELLED | OUTPATIENT
Start: 2025-05-21

## 2025-05-21 RX ORDER — OXYCODONE HYDROCHLORIDE 5 MG/1
5 TABLET ORAL
Refills: 0 | Status: CANCELLED | OUTPATIENT
Start: 2025-05-21

## 2025-05-21 RX ORDER — OXYCODONE HYDROCHLORIDE 10 MG/1
10 TABLET ORAL
Refills: 0 | Status: CANCELLED | OUTPATIENT
Start: 2025-05-21

## 2025-05-21 ASSESSMENT — LIFESTYLE VARIABLES: TOBACCO_USE: 0

## 2025-05-21 ASSESSMENT — ENCOUNTER SYMPTOMS: SEIZURES: 0

## 2025-05-21 NOTE — ANESTHESIA PREPROCEDURE EVALUATION
Anesthesia Pre-Procedure Evaluation    Patient: Geovanna Sepulveda   MRN: [id number] : 1965          Procedure : Procedure(s):  ADRENALECTOMY, LAPAROSCOPIC, posterior approach       60 yo F undergoing above stated procedure for removal of a 2.1 cm R adrenal nodule in s/o years of significant HTN and hypokalemia; there is likely combined aldosterone secretion and hypercortisolism. Otherwise, hx notable for prediabetes, HTN (baseline 140/90s), anxiety, well controlled asthma, and peripheral neuropathy of lower extremities.    Plan for GA with ETT, PIV x2, radial art line, and available pressor (phenylephrine) and vasodilator (nicardipine) boluses and infusions.       Past Medical History:   Diagnosis Date    Hypertension       Past Surgical History:   Procedure Laterality Date    CARPAL TUNNEL RELEASE RT/LT Bilateral 2020    [facility]    GYN SURGERY      laparoscopy to clean fallopian tube in the [facility]      Allergies   Allergen Reactions    Other Environmental Allergy Itching    Sulfamethoxazole-Trimethoprim Rash      Social History     Tobacco Use    Smoking status: Never     Passive exposure: Never    Smokeless tobacco: Never   Substance Use Topics    Alcohol use: Never      Wt Readings from Last 1 Encounters:   25 67.9 kg (149 lb 9.6 oz)        Anesthesia Evaluation   Pt has had prior anesthetic. Type: Regional and General.    No history of anesthetic complications       ROS/MED HX  ENT/Pulmonary: Comment:   #Asthma, intermittent: PTA inhaler prn    (+)     LIVIA risk factors,  hypertension,                              (-) tobacco use   Neurologic: Comment:   #Neuropathy, lower extremities: PTA gabapentin   (-) no seizures, no CVA and migraines   Cardiovascular: Comment:   #HTN: PTA amlodipine, baseline /90s  #HLD: atorvastatin    -25: evaluated in ED for unspecified chest pain. Through the evaluation with testing, was determined to not be cardiac and perhaps more GI  symptoms. Please see ED physician's note from 4/24/25 for further information.       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    Hematologic:    (-) history of blood clots, anemia and history of blood transfusion   Musculoskeletal: Comment:   #Hx chronic pain: low back pain down radiating down to legs. PTA gabapentin      GI/Hepatic:    (-) GERD and liver disease   Renal/Genitourinary: Comment:   Cr 5/7/2025 0.96, eGFR 68      Endo: Comment:   #R adrenal nodule: 2.1 cm with likely combined aldosterone secretion and hypercortisolism. PTA spronolactone  - Hx elevated BP, hypokalemia, elevated aldosterone, suppressed renin  - Normal ACTH, elevated cortisol   - 11/2024: normetanephrine 0.42, metanephrine 0.12  #Prediabetes: previously on metformin.  Changed diet and now A1C has normalized.    (-) Type I DM, Type II DM, thyroid disease, chronic steroid usage and obesity   Psychiatric/Substance Use: Comment:   #Depression      Infectious Disease:  - neg infectious disease ROS     Malignancy:  - neg malignancy ROS     Other:  - neg other ROS            Physical Exam  Airway  Mallampati: II  TM distance: >3 FB  Neck ROM: full  Upper bite lip test: I  Mouth opening: >= 4 cm    Cardiovascular - normal exam Comments:   #HTN: PTA amlodipine, baseline /90s  #HLD: atorvastatin    -4/24/25: evaluated in ED for unspecified chest pain. Through the evaluation with testing, was determined to not be cardiac and perhaps more GI symptoms. Please see ED  "physician's note from 4/24/25 for further information.     Dental   (+) Minor Abnormalities - some fillings, tiny chips      Pulmonary - normal exam      Neurological - normal exam  She appears awake, alert and oriented x3.    Other Findings       OUTSIDE LABS:  CBC:   Lab Results   Component Value Date    WBC 7.8 05/07/2025    WBC 8.8 04/24/2025    HGB 12.9 05/07/2025    HGB 13.2 04/24/2025    HCT 38.6 05/07/2025    HCT 39.1 04/24/2025     05/07/2025     04/24/2025     BMP:   Lab Results   Component Value Date     05/07/2025     04/24/2025    POTASSIUM 4.4 05/07/2025    POTASSIUM 4.9 04/24/2025    CHLORIDE 104 05/07/2025    CHLORIDE 103 04/24/2025    CO2 25 05/07/2025    CO2 25 04/24/2025    BUN 32.5 (H) 05/07/2025    BUN 29.0 (H) 04/24/2025    CR 0.96 (H) 05/07/2025    CR 0.79 04/24/2025     (H) 05/07/2025     (H) 04/24/2025     COAGS: No results found for: \"PTT\", \"INR\", \"FIBR\"  POC: No results found for: \"BGM\", \"HCG\", \"HCGS\"  HEPATIC:   Lab Results   Component Value Date    ALBUMIN 4.6 12/03/2024    PROTTOTAL 7.9 12/03/2024    ALT 20 12/03/2024    AST 25 12/03/2024    ALKPHOS 72 12/03/2024    BILITOTAL 0.5 12/03/2024     OTHER:   Lab Results   Component Value Date    A1C 5.7 (H) 04/09/2025    MIRNA 10.0 05/07/2025    MAG 2.1 10/23/2024    SED 18 10/25/2023       Anesthesia Plan    ASA Status:  3      NPO Status: NPO Appropriate   Anesthesia Type: General.  Airway: oral.  Induction: intravenous.  Maintenance: Balanced.   Techniques and Equipment:     - Airway:   Arterial Line Kit: Radial     - Monitoring Plan: standard ASA monitoring, train of four monitoring, arterial line kit, processed EEG monitor     Consents    Anesthesia Plan(s) and associated risks, benefits, and realistic alternatives discussed. Questions answered and patient/representative(s) expressed understanding.     - Discussed: surgeon     - Discussed with:  Patient        - Pt is DNR/DNI Status: no DNR   " "  Blood Consent:      - Discussed with: patient.     - Consented: consented to blood products     Postoperative Care    Pain management: non-narcotic analgesics, plan for postoperative opioid use, multimodal analgesia.     Comments:    Other Comments: GA with ETT, PIV x2, radial art line               Gertrudis Warner MD    I have reviewed the pertinent notes and labs in the chart from the past 30 days and (re)examined the patient.  Any updates or changes from those notes are reflected in this note.    Clinically Significant Risk Factors Present on Admission                   # Hypertension: Noted on problem list           # Overweight: Estimated body mass index is 28.27 kg/m  as calculated from the following:    Height as of 5/7/25: 1.549 m (5' 1\").    Weight as of 5/7/25: 67.9 kg (149 lb 9.6 oz).                    "

## 2025-05-22 ENCOUNTER — HOSPITAL ENCOUNTER (INPATIENT)
Facility: CLINIC | Age: 60
DRG: 615 | End: 2025-05-22
Attending: SURGERY | Admitting: SURGERY
Payer: COMMERCIAL

## 2025-05-22 ENCOUNTER — ANESTHESIA (OUTPATIENT)
Dept: SURGERY | Facility: CLINIC | Age: 60
End: 2025-05-22
Payer: COMMERCIAL

## 2025-05-22 VITALS
WEIGHT: 151.46 LBS | RESPIRATION RATE: 18 BRPM | DIASTOLIC BLOOD PRESSURE: 55 MMHG | SYSTOLIC BLOOD PRESSURE: 117 MMHG | OXYGEN SATURATION: 96 % | HEIGHT: 61 IN | HEART RATE: 63 BPM | BODY MASS INDEX: 28.6 KG/M2 | TEMPERATURE: 97.8 F

## 2025-05-22 DIAGNOSIS — E27.9 ADRENAL NODULE: Primary | ICD-10-CM

## 2025-05-22 DIAGNOSIS — E26.9 HYPERALDOSTERONISM: ICD-10-CM

## 2025-05-22 DIAGNOSIS — E26.9 HIGH ALDOSTERONE: ICD-10-CM

## 2025-05-22 LAB
ABO + RH BLD: NORMAL
ALBUMIN SERPL BCG-MCNC: 4.6 G/DL (ref 3.5–5.2)
ALP SERPL-CCNC: 75 U/L (ref 40–150)
ALT SERPL W P-5'-P-CCNC: 26 U/L (ref 0–50)
ANION GAP SERPL CALCULATED.3IONS-SCNC: 10 MMOL/L (ref 7–15)
ANION GAP SERPL CALCULATED.3IONS-SCNC: 12 MMOL/L (ref 7–15)
AST SERPL W P-5'-P-CCNC: 23 U/L (ref 0–45)
BASE EXCESS BLDA CALC-SCNC: -1.6 MMOL/L (ref -3–3)
BASE EXCESS BLDA CALC-SCNC: -3.7 MMOL/L (ref -3–3)
BASE EXCESS BLDA CALC-SCNC: -4 MMOL/L (ref -3–3)
BILIRUB SERPL-MCNC: 0.3 MG/DL
BLD GP AB SCN SERPL QL: NEGATIVE
BUN SERPL-MCNC: 27.3 MG/DL (ref 6–20)
BUN SERPL-MCNC: 32.5 MG/DL (ref 6–20)
CA-I BLD-MCNC: 4.5 MG/DL (ref 4.4–5.2)
CA-I BLD-MCNC: 4.6 MG/DL (ref 4.4–5.2)
CA-I BLD-MCNC: 4.7 MG/DL (ref 4.4–5.2)
CALCIUM SERPL-MCNC: 8.8 MG/DL (ref 8.8–10.4)
CALCIUM SERPL-MCNC: 9.5 MG/DL (ref 8.8–10.4)
CHLORIDE SERPL-SCNC: 104 MMOL/L (ref 98–107)
CHLORIDE SERPL-SCNC: 104 MMOL/L (ref 98–107)
CREAT SERPL-MCNC: 0.83 MG/DL (ref 0.51–0.95)
CREAT SERPL-MCNC: 0.84 MG/DL (ref 0.51–0.95)
CREAT SERPL-MCNC: 0.89 MG/DL (ref 0.51–0.95)
EGFRCR SERPLBLD CKD-EPI 2021: 75 ML/MIN/1.73M2
EGFRCR SERPLBLD CKD-EPI 2021: 80 ML/MIN/1.73M2
EGFRCR SERPLBLD CKD-EPI 2021: 81 ML/MIN/1.73M2
ERYTHROCYTE [DISTWIDTH] IN BLOOD BY AUTOMATED COUNT: 11.9 % (ref 10–15)
GLUCOSE BLD-MCNC: 124 MG/DL (ref 70–99)
GLUCOSE BLD-MCNC: 174 MG/DL (ref 70–99)
GLUCOSE BLD-MCNC: 204 MG/DL (ref 70–99)
GLUCOSE BLDC GLUCOMTR-MCNC: 110 MG/DL (ref 70–99)
GLUCOSE BLDC GLUCOMTR-MCNC: 133 MG/DL (ref 70–99)
GLUCOSE SERPL-MCNC: 109 MG/DL (ref 70–99)
GLUCOSE SERPL-MCNC: 132 MG/DL (ref 70–99)
HCO3 BLDA-SCNC: 23 MMOL/L (ref 21–28)
HCO3 BLDA-SCNC: 24 MMOL/L (ref 21–28)
HCO3 BLDA-SCNC: 24 MMOL/L (ref 21–28)
HCO3 SERPL-SCNC: 22 MMOL/L (ref 22–29)
HCO3 SERPL-SCNC: 25 MMOL/L (ref 22–29)
HCT VFR BLD AUTO: 41.8 % (ref 35–47)
HGB BLD-MCNC: 12.3 G/DL (ref 11.7–15.7)
HGB BLD-MCNC: 12.4 G/DL (ref 11.7–15.7)
HGB BLD-MCNC: 13.1 G/DL (ref 11.7–15.7)
HGB BLD-MCNC: 13.6 G/DL (ref 11.7–15.7)
LACTATE BLD-SCNC: 1.3 MMOL/L (ref 0.7–2)
LACTATE BLD-SCNC: 1.5 MMOL/L (ref 0.7–2)
LACTATE BLD-SCNC: 2 MMOL/L (ref 0.7–2)
MCH RBC QN AUTO: 29.2 PG (ref 26.5–33)
MCHC RBC AUTO-ENTMCNC: 32.5 G/DL (ref 31.5–36.5)
MCV RBC AUTO: 90 FL (ref 78–100)
O2/TOTAL GAS SETTING VFR VENT: 40 %
O2/TOTAL GAS SETTING VFR VENT: 45 %
O2/TOTAL GAS SETTING VFR VENT: 70 %
OXYHGB MFR BLDA: 97 % (ref 92–100)
OXYHGB MFR BLDA: 98 % (ref 92–100)
OXYHGB MFR BLDA: 98 % (ref 92–100)
PCO2 BLDA: 37 MM HG (ref 35–45)
PCO2 BLDA: 53 MM HG (ref 35–45)
PCO2 BLDA: 55 MM HG (ref 35–45)
PH BLDA: 7.25 [PH] (ref 7.35–7.45)
PH BLDA: 7.26 [PH] (ref 7.35–7.45)
PH BLDA: 7.4 [PH] (ref 7.35–7.45)
PLATELET # BLD AUTO: 336 10E3/UL (ref 150–450)
PO2 BLDA: 136 MM HG (ref 80–105)
PO2 BLDA: 243 MM HG (ref 80–105)
PO2 BLDA: 324 MM HG (ref 80–105)
POTASSIUM BLD-SCNC: 3.9 MMOL/L (ref 3.4–5.3)
POTASSIUM BLD-SCNC: 4.1 MMOL/L (ref 3.4–5.3)
POTASSIUM BLD-SCNC: 4.2 MMOL/L (ref 3.4–5.3)
POTASSIUM SERPL-SCNC: 4.2 MMOL/L (ref 3.4–5.3)
POTASSIUM SERPL-SCNC: 4.3 MMOL/L (ref 3.4–5.3)
PROT SERPL-MCNC: 8.4 G/DL (ref 6.4–8.3)
RBC # BLD AUTO: 4.66 10E6/UL (ref 3.8–5.2)
SAO2 % BLDA: 100 % (ref 96–97)
SAO2 % BLDA: 100 % (ref 96–97)
SAO2 % BLDA: 99 % (ref 96–97)
SODIUM BLD-SCNC: 140 MMOL/L (ref 135–145)
SODIUM BLD-SCNC: 141 MMOL/L (ref 135–145)
SODIUM BLD-SCNC: 141 MMOL/L (ref 135–145)
SODIUM SERPL-SCNC: 138 MMOL/L (ref 135–145)
SODIUM SERPL-SCNC: 139 MMOL/L (ref 135–145)
SPECIMEN EXP DATE BLD: NORMAL
WBC # BLD AUTO: 10.6 10E3/UL (ref 4–11)

## 2025-05-22 PROCEDURE — 250N000025 HC SEVOFLURANE, PER MIN: Performed by: SURGERY

## 2025-05-22 PROCEDURE — 258N000003 HC RX IP 258 OP 636: Performed by: STUDENT IN AN ORGANIZED HEALTH CARE EDUCATION/TRAINING PROGRAM

## 2025-05-22 PROCEDURE — 84132 ASSAY OF SERUM POTASSIUM: CPT

## 2025-05-22 PROCEDURE — 0GT34ZZ RESECTION OF RIGHT ADRENAL GLAND, PERCUTANEOUS ENDOSCOPIC APPROACH: ICD-10-PCS | Performed by: SURGERY

## 2025-05-22 PROCEDURE — 88360 TUMOR IMMUNOHISTOCHEM/MANUAL: CPT | Mod: TC | Performed by: SURGERY

## 2025-05-22 PROCEDURE — 258N000003 HC RX IP 258 OP 636: Performed by: ANESTHESIOLOGY

## 2025-05-22 PROCEDURE — 82374 ASSAY BLOOD CARBON DIOXIDE: CPT

## 2025-05-22 PROCEDURE — 360N000077 HC SURGERY LEVEL 4, PER MIN: Performed by: SURGERY

## 2025-05-22 PROCEDURE — 36415 COLL VENOUS BLD VENIPUNCTURE: CPT | Performed by: STUDENT IN AN ORGANIZED HEALTH CARE EDUCATION/TRAINING PROGRAM

## 2025-05-22 PROCEDURE — 370N000017 HC ANESTHESIA TECHNICAL FEE, PER MIN: Performed by: SURGERY

## 2025-05-22 PROCEDURE — 250N000009 HC RX 250: Performed by: SURGERY

## 2025-05-22 PROCEDURE — 710N000010 HC RECOVERY PHASE 1, LEVEL 2, PER MIN: Performed by: SURGERY

## 2025-05-22 PROCEDURE — 82565 ASSAY OF CREATININE: CPT | Performed by: STUDENT IN AN ORGANIZED HEALTH CARE EDUCATION/TRAINING PROGRAM

## 2025-05-22 PROCEDURE — 82310 ASSAY OF CALCIUM: CPT | Performed by: STUDENT IN AN ORGANIZED HEALTH CARE EDUCATION/TRAINING PROGRAM

## 2025-05-22 PROCEDURE — 250N000009 HC RX 250: Performed by: ANESTHESIOLOGY

## 2025-05-22 PROCEDURE — 60650 LAPAROSCOPY ADRENALECTOMY: CPT | Mod: RT | Performed by: SURGERY

## 2025-05-22 PROCEDURE — 250N000013 HC RX MED GY IP 250 OP 250 PS 637: Performed by: STUDENT IN AN ORGANIZED HEALTH CARE EDUCATION/TRAINING PROGRAM

## 2025-05-22 PROCEDURE — 120N000002 HC R&B MED SURG/OB UMMC

## 2025-05-22 PROCEDURE — 99207 PR NO BILLABLE SERVICE THIS VISIT: CPT | Performed by: INTERNAL MEDICINE

## 2025-05-22 PROCEDURE — 250N000011 HC RX IP 250 OP 636

## 2025-05-22 PROCEDURE — 999N000141 HC STATISTIC PRE-PROCEDURE NURSING ASSESSMENT: Performed by: SURGERY

## 2025-05-22 PROCEDURE — 250N000013 HC RX MED GY IP 250 OP 250 PS 637

## 2025-05-22 PROCEDURE — 36415 COLL VENOUS BLD VENIPUNCTURE: CPT

## 2025-05-22 PROCEDURE — 86850 RBC ANTIBODY SCREEN: CPT

## 2025-05-22 PROCEDURE — 250N000011 HC RX IP 250 OP 636: Performed by: ANESTHESIOLOGY

## 2025-05-22 PROCEDURE — 272N000001 HC OR GENERAL SUPPLY STERILE: Performed by: SURGERY

## 2025-05-22 PROCEDURE — 250N000011 HC RX IP 250 OP 636: Performed by: SURGERY

## 2025-05-22 PROCEDURE — 85014 HEMATOCRIT: CPT

## 2025-05-22 RX ORDER — OXYCODONE HYDROCHLORIDE 5 MG/1
5-10 TABLET ORAL EVERY 4 HOURS PRN
Status: DISCONTINUED | OUTPATIENT
Start: 2025-05-22 | End: 2025-05-23 | Stop reason: HOSPADM

## 2025-05-22 RX ORDER — ROPIVACAINE IN 0.9% SOD CHL/PF 0.1 %
.01-.2 PLASTIC BAG, INJECTION (ML) EPIDURAL CONTINUOUS
Status: DISCONTINUED | OUTPATIENT
Start: 2025-05-22 | End: 2025-05-22 | Stop reason: HOSPADM

## 2025-05-22 RX ORDER — SODIUM CHLORIDE, SODIUM LACTATE, POTASSIUM CHLORIDE, CALCIUM CHLORIDE 600; 310; 30; 20 MG/100ML; MG/100ML; MG/100ML; MG/100ML
INJECTION, SOLUTION INTRAVENOUS CONTINUOUS PRN
Status: DISCONTINUED | OUTPATIENT
Start: 2025-05-22 | End: 2025-05-22

## 2025-05-22 RX ORDER — FENTANYL CITRATE 50 UG/ML
INJECTION, SOLUTION INTRAMUSCULAR; INTRAVENOUS PRN
Status: DISCONTINUED | OUTPATIENT
Start: 2025-05-22 | End: 2025-05-22

## 2025-05-22 RX ORDER — DEXTROSE MONOHYDRATE 100 MG/ML
INJECTION, SOLUTION INTRAVENOUS CONTINUOUS PRN
Status: DISCONTINUED | OUTPATIENT
Start: 2025-05-22 | End: 2025-05-22 | Stop reason: HOSPADM

## 2025-05-22 RX ORDER — HYDROCORTISONE 20 MG/1
20 TABLET ORAL ONCE
Status: COMPLETED | OUTPATIENT
Start: 2025-05-23 | End: 2025-05-23

## 2025-05-22 RX ORDER — FENTANYL CITRATE 50 UG/ML
25 INJECTION, SOLUTION INTRAMUSCULAR; INTRAVENOUS EVERY 5 MIN PRN
Status: DISCONTINUED | OUTPATIENT
Start: 2025-05-22 | End: 2025-05-22 | Stop reason: HOSPADM

## 2025-05-22 RX ORDER — NALOXONE HYDROCHLORIDE 0.4 MG/ML
0.2 INJECTION, SOLUTION INTRAMUSCULAR; INTRAVENOUS; SUBCUTANEOUS
Status: DISCONTINUED | OUTPATIENT
Start: 2025-05-22 | End: 2025-05-23 | Stop reason: HOSPADM

## 2025-05-22 RX ORDER — KETAMINE HYDROCHLORIDE 10 MG/ML
INJECTION INTRAMUSCULAR; INTRAVENOUS PRN
Status: DISCONTINUED | OUTPATIENT
Start: 2025-05-22 | End: 2025-05-22

## 2025-05-22 RX ORDER — ONDANSETRON 4 MG/1
4 TABLET, ORALLY DISINTEGRATING ORAL EVERY 30 MIN PRN
Status: DISCONTINUED | OUTPATIENT
Start: 2025-05-22 | End: 2025-05-22 | Stop reason: HOSPADM

## 2025-05-22 RX ORDER — FENTANYL CITRATE 50 UG/ML
50 INJECTION, SOLUTION INTRAMUSCULAR; INTRAVENOUS EVERY 5 MIN PRN
Status: DISCONTINUED | OUTPATIENT
Start: 2025-05-22 | End: 2025-05-22 | Stop reason: HOSPADM

## 2025-05-22 RX ORDER — ENOXAPARIN SODIUM 100 MG/ML
40 INJECTION SUBCUTANEOUS EVERY 24 HOURS
Status: DISCONTINUED | OUTPATIENT
Start: 2025-05-23 | End: 2025-05-23 | Stop reason: HOSPADM

## 2025-05-22 RX ORDER — LIDOCAINE 40 MG/G
CREAM TOPICAL
Status: DISCONTINUED | OUTPATIENT
Start: 2025-05-22 | End: 2025-05-22 | Stop reason: HOSPADM

## 2025-05-22 RX ORDER — NALOXONE HYDROCHLORIDE 0.4 MG/ML
0.4 INJECTION, SOLUTION INTRAMUSCULAR; INTRAVENOUS; SUBCUTANEOUS
Status: DISCONTINUED | OUTPATIENT
Start: 2025-05-22 | End: 2025-05-23 | Stop reason: HOSPADM

## 2025-05-22 RX ORDER — LIDOCAINE 40 MG/G
CREAM TOPICAL
Status: DISCONTINUED | OUTPATIENT
Start: 2025-05-22 | End: 2025-05-23 | Stop reason: HOSPADM

## 2025-05-22 RX ORDER — CEFAZOLIN SODIUM/WATER 2 G/20 ML
2 SYRINGE (ML) INTRAVENOUS SEE ADMIN INSTRUCTIONS
Status: DISCONTINUED | OUTPATIENT
Start: 2025-05-22 | End: 2025-05-22 | Stop reason: HOSPADM

## 2025-05-22 RX ORDER — SODIUM CHLORIDE, SODIUM LACTATE, POTASSIUM CHLORIDE, CALCIUM CHLORIDE 600; 310; 30; 20 MG/100ML; MG/100ML; MG/100ML; MG/100ML
INJECTION, SOLUTION INTRAVENOUS CONTINUOUS
Status: DISCONTINUED | OUTPATIENT
Start: 2025-05-22 | End: 2025-05-22 | Stop reason: HOSPADM

## 2025-05-22 RX ORDER — LIDOCAINE HYDROCHLORIDE 20 MG/ML
INJECTION, SOLUTION INFILTRATION; PERINEURAL PRN
Status: DISCONTINUED | OUTPATIENT
Start: 2025-05-22 | End: 2025-05-22

## 2025-05-22 RX ORDER — DEXAMETHASONE SODIUM PHOSPHATE 4 MG/ML
INJECTION, SOLUTION INTRA-ARTICULAR; INTRALESIONAL; INTRAMUSCULAR; INTRAVENOUS; SOFT TISSUE PRN
Status: DISCONTINUED | OUTPATIENT
Start: 2025-05-22 | End: 2025-05-22

## 2025-05-22 RX ORDER — HYDROMORPHONE HCL IN WATER/PF 6 MG/30 ML
0.2 PATIENT CONTROLLED ANALGESIA SYRINGE INTRAVENOUS EVERY 5 MIN PRN
Status: DISCONTINUED | OUTPATIENT
Start: 2025-05-22 | End: 2025-05-22 | Stop reason: HOSPADM

## 2025-05-22 RX ORDER — BUPIVACAINE HCL/EPINEPHRINE 0.25-.0005
VIAL (ML) INJECTION PRN
Status: DISCONTINUED | OUTPATIENT
Start: 2025-05-22 | End: 2025-05-22 | Stop reason: HOSPADM

## 2025-05-22 RX ORDER — HYDROMORPHONE HCL IN WATER/PF 6 MG/30 ML
0.4 PATIENT CONTROLLED ANALGESIA SYRINGE INTRAVENOUS EVERY 5 MIN PRN
Status: DISCONTINUED | OUTPATIENT
Start: 2025-05-22 | End: 2025-05-22 | Stop reason: HOSPADM

## 2025-05-22 RX ORDER — LABETALOL HYDROCHLORIDE 5 MG/ML
10 INJECTION, SOLUTION INTRAVENOUS
Status: DISCONTINUED | OUTPATIENT
Start: 2025-05-22 | End: 2025-05-22 | Stop reason: HOSPADM

## 2025-05-22 RX ORDER — CEFAZOLIN SODIUM/WATER 2 G/20 ML
2 SYRINGE (ML) INTRAVENOUS
Status: COMPLETED | OUTPATIENT
Start: 2025-05-22 | End: 2025-05-22

## 2025-05-22 RX ORDER — PROPOFOL 10 MG/ML
INJECTION, EMULSION INTRAVENOUS PRN
Status: DISCONTINUED | OUTPATIENT
Start: 2025-05-22 | End: 2025-05-22

## 2025-05-22 RX ORDER — HYDROMORPHONE HCL IN WATER/PF 6 MG/30 ML
.2-.4 PATIENT CONTROLLED ANALGESIA SYRINGE INTRAVENOUS
Status: DISCONTINUED | OUTPATIENT
Start: 2025-05-22 | End: 2025-05-23 | Stop reason: HOSPADM

## 2025-05-22 RX ORDER — ATORVASTATIN CALCIUM 40 MG/1
40 TABLET, FILM COATED ORAL EVERY MORNING
Status: DISCONTINUED | OUTPATIENT
Start: 2025-05-23 | End: 2025-05-23 | Stop reason: HOSPADM

## 2025-05-22 RX ORDER — NALOXONE HYDROCHLORIDE 0.4 MG/ML
0.1 INJECTION, SOLUTION INTRAMUSCULAR; INTRAVENOUS; SUBCUTANEOUS
Status: DISCONTINUED | OUTPATIENT
Start: 2025-05-22 | End: 2025-05-22 | Stop reason: HOSPADM

## 2025-05-22 RX ORDER — SODIUM CHLORIDE, SODIUM LACTATE, POTASSIUM CHLORIDE, CALCIUM CHLORIDE 600; 310; 30; 20 MG/100ML; MG/100ML; MG/100ML; MG/100ML
INJECTION, SOLUTION INTRAVENOUS CONTINUOUS
Status: DISCONTINUED | OUTPATIENT
Start: 2025-05-22 | End: 2025-05-23

## 2025-05-22 RX ORDER — ONDANSETRON 2 MG/ML
INJECTION INTRAMUSCULAR; INTRAVENOUS PRN
Status: DISCONTINUED | OUTPATIENT
Start: 2025-05-22 | End: 2025-05-22

## 2025-05-22 RX ORDER — DEXAMETHASONE SODIUM PHOSPHATE 4 MG/ML
4 INJECTION, SOLUTION INTRA-ARTICULAR; INTRALESIONAL; INTRAMUSCULAR; INTRAVENOUS; SOFT TISSUE
Status: DISCONTINUED | OUTPATIENT
Start: 2025-05-22 | End: 2025-05-22 | Stop reason: HOSPADM

## 2025-05-22 RX ORDER — DEXTROSE MONOHYDRATE 25 G/50ML
25-50 INJECTION, SOLUTION INTRAVENOUS
Status: DISCONTINUED | OUTPATIENT
Start: 2025-05-22 | End: 2025-05-22 | Stop reason: HOSPADM

## 2025-05-22 RX ORDER — NICOTINE POLACRILEX 4 MG
15-30 LOZENGE BUCCAL
Status: DISCONTINUED | OUTPATIENT
Start: 2025-05-22 | End: 2025-05-22 | Stop reason: HOSPADM

## 2025-05-22 RX ORDER — ACETAMINOPHEN 325 MG/1
975 TABLET ORAL ONCE
Status: COMPLETED | OUTPATIENT
Start: 2025-05-22 | End: 2025-05-22

## 2025-05-22 RX ORDER — GABAPENTIN 300 MG/1
300 CAPSULE ORAL 3 TIMES DAILY
Status: DISCONTINUED | OUTPATIENT
Start: 2025-05-22 | End: 2025-05-23 | Stop reason: HOSPADM

## 2025-05-22 RX ORDER — ONDANSETRON 2 MG/ML
4 INJECTION INTRAMUSCULAR; INTRAVENOUS EVERY 30 MIN PRN
Status: DISCONTINUED | OUTPATIENT
Start: 2025-05-22 | End: 2025-05-22 | Stop reason: HOSPADM

## 2025-05-22 RX ORDER — HYDROCORTISONE 20 MG/1
40 TABLET ORAL ONCE
Status: COMPLETED | OUTPATIENT
Start: 2025-05-23 | End: 2025-05-23

## 2025-05-22 RX ADMIN — PROPOFOL 150 MG: 10 INJECTION, EMULSION INTRAVENOUS at 07:52

## 2025-05-22 RX ADMIN — PHENYLEPHRINE HYDROCHLORIDE 200 MCG: 10 INJECTION INTRAVENOUS at 08:26

## 2025-05-22 RX ADMIN — Medication 10 MG: at 10:50

## 2025-05-22 RX ADMIN — Medication 100 MG: at 11:09

## 2025-05-22 RX ADMIN — DEXAMETHASONE SODIUM PHOSPHATE 4 MG: 4 INJECTION, SOLUTION INTRAMUSCULAR; INTRAVENOUS at 08:05

## 2025-05-22 RX ADMIN — ONDANSETRON 4 MG: 2 INJECTION, SOLUTION INTRAMUSCULAR; INTRAVENOUS at 11:45

## 2025-05-22 RX ADMIN — PROPOFOL 30 MG: 10 INJECTION, EMULSION INTRAVENOUS at 10:59

## 2025-05-22 RX ADMIN — LIDOCAINE HYDROCHLORIDE 100 MG: 20 INJECTION, SOLUTION INFILTRATION; PERINEURAL at 07:52

## 2025-05-22 RX ADMIN — FENTANYL CITRATE 100 MCG: 50 INJECTION INTRAMUSCULAR; INTRAVENOUS at 07:52

## 2025-05-22 RX ADMIN — GABAPENTIN 300 MG: 300 CAPSULE ORAL at 21:29

## 2025-05-22 RX ADMIN — SODIUM CHLORIDE, SODIUM LACTATE, POTASSIUM CHLORIDE, AND CALCIUM CHLORIDE: .6; .31; .03; .02 INJECTION, SOLUTION INTRAVENOUS at 10:59

## 2025-05-22 RX ADMIN — Medication 10 MG: at 08:50

## 2025-05-22 RX ADMIN — HYDROMORPHONE HYDROCHLORIDE 0.5 MG: 1 INJECTION, SOLUTION INTRAMUSCULAR; INTRAVENOUS; SUBCUTANEOUS at 11:01

## 2025-05-22 RX ADMIN — PHENYLEPHRINE HYDROCHLORIDE 100 MCG: 10 INJECTION INTRAVENOUS at 08:14

## 2025-05-22 RX ADMIN — OXYCODONE HYDROCHLORIDE 5 MG: 5 TABLET ORAL at 14:06

## 2025-05-22 RX ADMIN — PHENYLEPHRINE HYDROCHLORIDE 300 MCG: 10 INJECTION INTRAVENOUS at 08:38

## 2025-05-22 RX ADMIN — Medication 2 G: at 08:10

## 2025-05-22 RX ADMIN — MIDAZOLAM 2 MG: 1 INJECTION INTRAMUSCULAR; INTRAVENOUS at 07:39

## 2025-05-22 RX ADMIN — Medication 100 MG: at 07:52

## 2025-05-22 RX ADMIN — SODIUM CHLORIDE, SODIUM LACTATE, POTASSIUM CHLORIDE, AND CALCIUM CHLORIDE: .6; .31; .03; .02 INJECTION, SOLUTION INTRAVENOUS at 07:46

## 2025-05-22 RX ADMIN — ONDANSETRON 4 MG: 2 INJECTION INTRAMUSCULAR; INTRAVENOUS at 10:53

## 2025-05-22 RX ADMIN — PHENYLEPHRINE HYDROCHLORIDE 100 MCG: 10 INJECTION INTRAVENOUS at 07:52

## 2025-05-22 RX ADMIN — INSULIN HUMAN 2 UNITS/HR: 1 INJECTION, SOLUTION INTRAVENOUS at 10:32

## 2025-05-22 RX ADMIN — Medication 10 MG: at 09:57

## 2025-05-22 RX ADMIN — Medication 100 MG: at 10:59

## 2025-05-22 RX ADMIN — OXYCODONE HYDROCHLORIDE 5 MG: 5 TABLET ORAL at 18:34

## 2025-05-22 RX ADMIN — SODIUM CHLORIDE, SODIUM LACTATE, POTASSIUM CHLORIDE, AND CALCIUM CHLORIDE: .6; .31; .03; .02 INJECTION, SOLUTION INTRAVENOUS at 17:25

## 2025-05-22 RX ADMIN — PHENYLEPHRINE HYDROCHLORIDE 200 MCG: 10 INJECTION INTRAVENOUS at 08:12

## 2025-05-22 RX ADMIN — GABAPENTIN 300 MG: 300 CAPSULE ORAL at 17:36

## 2025-05-22 RX ADMIN — PHENYLEPHRINE HYDROCHLORIDE 200 MCG: 10 INJECTION INTRAVENOUS at 08:05

## 2025-05-22 RX ADMIN — PHENYLEPHRINE HYDROCHLORIDE 200 MCG: 10 INJECTION INTRAVENOUS at 07:57

## 2025-05-22 RX ADMIN — ACETAMINOPHEN 975 MG: 325 TABLET ORAL at 06:04

## 2025-05-22 RX ADMIN — SODIUM CHLORIDE, SODIUM LACTATE, POTASSIUM CHLORIDE, CALCIUM CHLORIDE: 600; 310; 30; 20 INJECTION, SOLUTION INTRAVENOUS at 10:29

## 2025-05-22 RX ADMIN — Medication 20 MG: at 08:05

## 2025-05-22 ASSESSMENT — ACTIVITIES OF DAILY LIVING (ADL)
ADLS_ACUITY_SCORE: 40
ADLS_ACUITY_SCORE: 40
ADLS_ACUITY_SCORE: 41
ADLS_ACUITY_SCORE: 40
ADLS_ACUITY_SCORE: 43
ADLS_ACUITY_SCORE: 40
ADLS_ACUITY_SCORE: 41
ADLS_ACUITY_SCORE: 40
ADLS_ACUITY_SCORE: 40
ADLS_ACUITY_SCORE: 41
ADLS_ACUITY_SCORE: 40
ADLS_ACUITY_SCORE: 43
ADLS_ACUITY_SCORE: 43

## 2025-05-22 NOTE — BRIEF OP NOTE
[facility]    Brief Operative Note    Pre-operative diagnosis: Adrenal nodule [E27.9]  Post-operative diagnosis Same as pre-operative diagnosis    Procedure: ADRENALECTOMY, LAPAROSCOPIC, posterior approach, Right - Abdomen    Surgeon: Surgeons and Role:     * Toni Beverly MD - Primary     * Norma Means MD - Resident - Assisting  Anesthesia: General   Estimated Blood Loss: 15mL    Drains: None  Specimens:   ID Type Source Tests Collected by Time Destination   1 : Right adrenal gland Tissue Adrenal Gland, Right SURGICAL PATHOLOGY EXAM Toni Beverly MD 5/22/2025 10:48 AM      Findings:   Right adrenal gland with 2cm nodule densely adherent to the liver. .  Complications: None.  Implants: * No implants in log *

## 2025-05-22 NOTE — PHARMACY-ADMISSION MEDICATION HISTORY
Pharmacist Admission Medication History    Admission medication history is complete. The information provided in this note is only as accurate as the sources available at the time of the update.    Information Source(s): Patient via phone    Pertinent Information: Spironolactone and Vit D have been on hold for over 2 weeks in preparation for surgery    Changes made to PTA medication list:  Added: None  Deleted: Duplicate Albuterol inhaler  Changed: None    Allergies reviewed with patient and updates made in EHR: yes    Medication History Completed By: Keith Deutsch RPH 5/22/2025 4:40 PM    PTA Med List   Medication Sig Last Dose/Taking    albuterol (PROAIR HFA/PROVENTIL HFA/VENTOLIN HFA) 108 (90 Base) MCG/ACT inhaler Inhale 2 puffs into the lungs every 6 hours as needed for shortness of breath, wheezing or cough. 5/21/2025    amLODIPine (NORVASC) 10 MG tablet Take 1 tablet (10 mg) by mouth daily. (Patient taking differently: Take 10 mg by mouth every morning.) 5/22/2025    atorvastatin (LIPITOR) 40 MG tablet Take 1 tablet (40 mg) by mouth daily. (Patient taking differently: Take 40 mg by mouth every morning.) 5/22/2025 Morning    gabapentin (NEURONTIN) 300 MG capsule Take 1 capsule (300 mg) by mouth 3 times daily. 5/22/2025 Morning    hydrOXYzine makeda (VISTARIL) 25 MG capsule Take 1 capsule (25 mg) by mouth nightly as needed. Past Month    spironolactone (ALDACTONE) 50 MG tablet Take 1 tablet (50 mg) by mouth daily. (Patient taking differently: Take 50 mg by mouth every morning.) More than a month    VITAMIN D3 25 MCG tablet Take 1 tablet by mouth daily More than a month

## 2025-05-22 NOTE — ANESTHESIA PROCEDURE NOTES
Arterial Line Procedure Note    Pre-Procedure   Staff -        Anesthesiologist:  Huseyin Pagan MD       Performed By: anesthesiologist       Location: OR       Pre-Anesthestic Checklist: patient identified, IV checked, risks and benefits discussed, informed consent, monitors and equipment checked, pre-op evaluation and at physician/surgeon's request  Timeout:       Correct Patient: Yes        Correct Procedure: Yes        Correct Site: Yes        Correct Position: Yes   Line Placement:   This line was placed Post Induction  Procedure   Procedure: arterial line       Laterality: left       Insertion Site: radial.  Sterile Prep        Standard elements of sterile barrier followed       Skin prep: Chloraprep  Insertion/Injection        Technique: ultrasound guided        Medical Necessity: need to minimize puncture attempts       1. Ultrasound was used to evaluate the access site.       2. Artery evaluated via ultrasound for patency/adequacy.       3. Using real-time ultrasound the needle/catheter was observed entering the artery/vein.       5. The visualized structures were anatomically normal.       6. There were no apparent abnormal pathologic findings.       Catheter Type/Size: 20 G, 1.75 in/4.5 cm quick cath (integral wire)  Narrative        Tegaderm dressing used.       Complications: None apparent,        Arterial waveform: Yes        IBP within 10% of NIBP: Yes

## 2025-05-22 NOTE — PROGRESS NOTES
Admitted/transferred from: PACU  2 RN full   skin assessment completed by Luly Gupta, RN and Sea RN.  Skin assessment finding: issues found 3 lap sites steri strips on one incision (dry gauze applied over steri strips-scant amount of blood) and PIV right/left forearm.   Interventions/actions: skin interventions dry gauze applied over incision with steri strips.      Bedside Emergency Equipment Present:  Suction Regulator: Yes  Suction Canister: Yes  Tubing between Regulator and Canister: Yes  O2 Regulator with Tree: Yes  Ambu Bag: Yes

## 2025-05-22 NOTE — OP NOTE
[facility]    Operative Note    Pre-operative diagnosis: Adrenal nodule [E27.9]   Post-operative diagnosis * No post-op diagnosis entered *   Procedure: Procedure(s):  ADRENALECTOMY, LAPAROSCOPIC, posterior approach   Surgeon: Surgeons and Role:     * Toni Beverly MD - Primary     * Norma Means MD - Resident - Assisting   Anesthesia: General    Estimated blood loss: 15mL   Drains: None   Specimens: ID Type Source Tests Collected by Time Destination   1 : Right adrenal gland Tissue Adrenal Gland, Right SURGICAL PATHOLOGY EXAM Toni Beverly MD 5/22/2025 10:48 AM       Findings: Right adrenal gland with 2cm nodule densely adherent to the liver. ..   Complications: None.   Implants: None.       Indications:  This is a 58yo F with a history of HTN and prediabetes who was identified to have a high aldosterone: renin ratio, hypokalemia as well as high cortisol levels. She failed her 1mg and 8mg dexamethasone suppression test. Imaging shows a 2.1cm right adrenal adenoma. Salt suppression testing and adrenal vein sampling was not needed. Due to the likelihood of this right adrenal adenoma causing her symptoms, the patient consented to undergo a right laparoscopic adrenalectomy, posterior approach.      Description:   The patient was brought to the operating room and general anesthesia was induced. An arterial line was placed by anesthesia. The patient was then positioned prone with hips and knees flexed at 90 degrees with knees on a padded knee rest. The hips and chest were padded with gel rolls. The right flank was prepared and draped in routine sterile fashion.  A timeout was performed.      An incision for a 12-mm port was made at the inferior border of the tip of the 12th rib on the right side. The incision was carried down to the fascia, which was incised and and underlying muscle was bluntly opened with finger dissection. The retroperitoneum was then  bluntly entered with a finger. A space was bluntly created and the kidney was palpated posterior to the finger and the 12th rib was anterior relative to the bed. Two 5-mm ports were placed lateral and medial to the 12-mm port incision into the retroperitoneal space through direct palpation on to a finger. The 12-mm port was then placed, directed cephalad under the rib within the retroperitoneum. The retroperitoneum was then insufflated to 20 mm Hg. The Ligasure was used to bluntly develop the retroperitoneal space laterally taking care not to violate the peritoneum. Gerotas fascia was entered. The right adrenal gland and kidney were identified. The retroperitoneal attachments of the adrenal gland were divided circumferentially using blunt dissection and the Ligasure. The adrenal arteries were clearly identified and ligated. The right adrenal gland and mass was densely adherent to the liver due to inflammation as expected. Through meticulous dissection this was  without violating the tumor capsule. The adrenal vein was then identified entering the IVC and was quite short. This was taken with 5 fires of the Ligasure. The final retroperitoneal attachments were divided medially taking care not to injure the IVC and between the superior pole of the kidney and adrenal gland making sure not to injure the kidney. The dissection remained superior to the renal vein which was not visualized. The adrenal gland was then placed into an endocatch bag, removed from the sugical bed, and passed off the field. Hemostasis was confirmed. The insufflation was released.  Local anesthetic was used at all port sites. The 12 mm port was closed with a running 0-vicryl at the fascial level. 30cc of local anesthetic was used at the skin incisions. Skin was closed with 4-0 monocryl and dermabond.      All needle and sponge counts were correct at the end of the procedure.      The patient tolerated the procedure well without any  immediate complications.  All sponge and needle counts were correct x2 at the end of the procedure.     Dr. Beverly was scrubbed for the entire procedure.    Norma Means MD  PGY-7 General Surgery

## 2025-05-22 NOTE — LETTER
[facility] [address] [facility]  [address] [address]  Phone: [phone]    May 23, 2025        Geovanna Sepulveda  [address]  [place] [address]          To whom it may concern:    RE: Geovanna Austin was hospitalized and had surgery on 5/22. She will need to be off work until June 23. She should not lift, carry, push, or pull more than 20lbs until then.     Please contact the minimally invasive surgery group for questions or concerns.      Sincerely,      Norma Means MD

## 2025-05-22 NOTE — ANESTHESIA CARE TRANSFER NOTE
Patient: Geovanna Sepulveda    Procedure: Procedure(s):  ADRENALECTOMY, LAPAROSCOPIC, posterior approach       Diagnosis: Adrenal nodule [E27.9]  Diagnosis Additional Information: No value filed.    Anesthesia Type:   General     Note:    Oropharynx: oropharynx clear of all foreign objects and spontaneously breathing  Level of Consciousness: awake  Oxygen Supplementation: face mask  Level of Supplemental Oxygen (L/min / FiO2): 6  Independent Airway: airway patency satisfactory and stable  Dentition: dentition unchanged  Vital Signs Stable: post-procedure vital signs reviewed and stable  Report to RN Given: handoff report given  Patient transferred to: PACU    Handoff Report: Identifed the Patient, Identified the Reponsible Provider, Reviewed the pertinent medical history, Discussed the surgical course, Reviewed Intra-OP anesthesia mangement and issues during anesthesia, Set expectations for post-procedure period and Allowed opportunity for questions and acknowledgement of understanding      Vitals:  Vitals Value Taken Time   /60 05/22/25 11:25   Temp 36.1    Pulse 79 05/22/25 11:30   Resp 19 05/22/25 11:30   SpO2 100 % 05/22/25 11:30   Vitals shown include unfiled device data.    Electronically Signed By: GILLES COHEN CRNA  May 22, 2025  11:30 AM

## 2025-05-22 NOTE — ANESTHESIA PROCEDURE NOTES
Airway       Patient location during procedure: OR       Procedure Start/Stop Times: 5/22/2025 7:54 AM  Staff -        Anesthesiologist:  Huseyin Pagan MD       CRNA: Wellington Gallardo APRN CRNA       Performed By: CRNA  Consent for Airway        Urgency: elective  Indications and Patient Condition       Indications for airway management: maykel-procedural       Induction type:intravenous       Mask difficulty assessment: 1 - vent by mask    Final Airway Details       Final airway type: endotracheal airway       Successful airway: ETT - single and Oral  Endotracheal Airway Details        ETT size (mm): 7.0       Cuffed: yes       Successful intubation technique: video laryngoscopy       VL Blade Size: Glidescope 3       Grade View of Cords: 1       Adjucts: stylet       Position: Right       Measured from: gums/teeth       Secured at (cm): 21       Bite block used: None    Post intubation assessment        Placement verified by: capnometry, equal breath sounds and chest rise        Number of attempts at approach: 1       Number of other approaches attempted: 0       Secured with: tape       Ease of procedure: easy       Dentition: Unchanged (unchanged from prior condition)    Medication(s) Administered   Medication Administration Time: 5/22/2025 7:54 AM    Additional Comments       VC open and clear.

## 2025-05-22 NOTE — CONSULTS
Assessment/Plan    # S/p right adrenalectomy (~2 cm right adrenal nodule) on 5/22/2025 to treat PA and MACS    Recommend:  Stop potassium supplementation (if she was taking it as an outpatient)   I'm not sure exactly which antihypertensives she was taking prior to admission (med list in Epic doesn't seem to be accurate). My general approach after adrenalectomy for PA is to stop MRAs, ARBs, and ACE-I and reduce other BP medication dosing by 50%.   Check a BMP this evening and again tomorrow morning.   She may have HPA axis suppression but we cannot assess this with a POD-1 8 AM serum cortisol (she received dexamethasone today), so please empirically start hydrocortisone tomorrow. On 5/23/2025 she can receive hydrocortisone 40 mg in the morning ~8 AM and 20 mg about 6 hours later ~midday. We'll taper her off hydrocortisone as an outpatient.     Objective    VS reviewed, she has been normotensive post-operatively. Labs and imaging reviewed.    I was unable to visit with her today - she not yet on the general patterson.     LAURA    Geovanna Sepulveda is a 59 year old female who underwent a right adrenalectomy on 5/22/2025 to treat PA and MACS.    Relevant comorbidities:  -hypertension, prediabetes (HbA1c 5.7% 4/9/2025)     CT renal mass protocol 10/30/2024: ~2 cm right adrenal nodule    She received IV dexamethasone 4 mg at 08:05 AM on 5/22/2025. Antihypertensive medications have been held since her adrenalectomy.     3/20/2024 at 8 AM: serum cortisol 9.7 mcg/dL    11/20/2024: DHEA-S 64 mcg/dL, plasma free metanephrines normal     11/23/2024 at 8:50 AM: ACTH undetectable, serum cortisol 2.9 mcg/dL (1 mg DST)    11/30/2024 at 8:45 AM: ACTH undetectable, serum cortisol 2.0 mcg/dL (8 mg DST)    3/9/2025: DHEA-S 57 mcg/dL     On multiple occasions PAC was significantly elevated with PRA <1 ng/mL/hour. Given this and prior hypokalemia, confirmatory testing for PA was omitted.

## 2025-05-22 NOTE — LETTER
[facility] [address] [facility]  [address] [facility] [address]  Phone: [phone]    May 23, 2025        Geovanna Sepulveda  [address]  [place] [address]          To whom it may concern:    RE: Geovanna Austin was hospitalized and had surgery on 5/22. She will need to be off work until June 23. She should not lift, carry, push, or pull more than 20lbs until then. She may return to work on June 23 with no restrictions.     Please contact the minimally invasive surgery group for questions or concerns.      Sincerely,      Norma Means MD

## 2025-05-22 NOTE — ANESTHESIA POSTPROCEDURE EVALUATION
Patient: Geovanna Sepulveda    Procedure: Procedure(s):  ADRENALECTOMY, LAPAROSCOPIC, posterior approach       Anesthesia Type:  General    Note:  Disposition: Admission   Postop Pain Control: Uneventful            Sign Out: Well controlled pain   PONV: No   Neuro/Psych: Uneventful            Sign Out: Acceptable/Baseline neuro status   Airway/Respiratory: Uneventful            Sign Out: Acceptable/Baseline resp. status   CV/Hemodynamics: Uneventful            Sign Out: Acceptable CV status; No obvious hypovolemia; No obvious fluid overload   Other NRE: NONE   DID A NON-ROUTINE EVENT OCCUR? No           Last vitals:  Vitals Value Taken Time   /65 05/22/25 13:00   Temp 36.7  C (98  F) 05/22/25 11:45   Pulse 72 05/22/25 13:09   Resp 12 05/22/25 13:09   SpO2 95 % 05/22/25 13:09   Vitals shown include unfiled device data.    Electronically Signed By: Zohra Caballero MD  May 22, 2025  1:10 PM

## 2025-05-23 VITALS
HEART RATE: 62 BPM | DIASTOLIC BLOOD PRESSURE: 71 MMHG | RESPIRATION RATE: 18 BRPM | OXYGEN SATURATION: 98 % | SYSTOLIC BLOOD PRESSURE: 135 MMHG | TEMPERATURE: 98.2 F | WEIGHT: 151.46 LBS | HEIGHT: 61 IN | BODY MASS INDEX: 28.6 KG/M2

## 2025-05-23 LAB
ANION GAP SERPL CALCULATED.3IONS-SCNC: 9 MMOL/L (ref 7–15)
BUN SERPL-MCNC: 21.1 MG/DL (ref 6–20)
CALCIUM SERPL-MCNC: 9 MG/DL (ref 8.8–10.4)
CHLORIDE SERPL-SCNC: 104 MMOL/L (ref 98–107)
CREAT SERPL-MCNC: 0.81 MG/DL (ref 0.51–0.95)
EGFRCR SERPLBLD CKD-EPI 2021: 84 ML/MIN/1.73M2
GLUCOSE BLDC GLUCOMTR-MCNC: 106 MG/DL (ref 70–99)
GLUCOSE BLDC GLUCOMTR-MCNC: 109 MG/DL (ref 70–99)
GLUCOSE BLDC GLUCOMTR-MCNC: 124 MG/DL (ref 70–99)
GLUCOSE SERPL-MCNC: 105 MG/DL (ref 70–99)
HCO3 SERPL-SCNC: 26 MMOL/L (ref 22–29)
POTASSIUM SERPL-SCNC: 4 MMOL/L (ref 3.4–5.3)
SODIUM SERPL-SCNC: 139 MMOL/L (ref 135–145)

## 2025-05-23 PROCEDURE — 36415 COLL VENOUS BLD VENIPUNCTURE: CPT | Performed by: STUDENT IN AN ORGANIZED HEALTH CARE EDUCATION/TRAINING PROGRAM

## 2025-05-23 PROCEDURE — 80048 BASIC METABOLIC PNL TOTAL CA: CPT | Performed by: STUDENT IN AN ORGANIZED HEALTH CARE EDUCATION/TRAINING PROGRAM

## 2025-05-23 PROCEDURE — 258N000003 HC RX IP 258 OP 636: Performed by: STUDENT IN AN ORGANIZED HEALTH CARE EDUCATION/TRAINING PROGRAM

## 2025-05-23 PROCEDURE — 250N000013 HC RX MED GY IP 250 OP 250 PS 637: Performed by: STUDENT IN AN ORGANIZED HEALTH CARE EDUCATION/TRAINING PROGRAM

## 2025-05-23 PROCEDURE — 250N000011 HC RX IP 250 OP 636: Performed by: STUDENT IN AN ORGANIZED HEALTH CARE EDUCATION/TRAINING PROGRAM

## 2025-05-23 PROCEDURE — 99222 1ST HOSP IP/OBS MODERATE 55: CPT | Mod: GC | Performed by: INTERNAL MEDICINE

## 2025-05-23 RX ORDER — HYDROCORTISONE 5 MG/1
10 TABLET ORAL DAILY
Qty: 60 TABLET | Refills: 0 | Status: SHIPPED | OUTPATIENT
Start: 2025-05-23

## 2025-05-23 RX ORDER — OXYCODONE HYDROCHLORIDE 5 MG/1
5 TABLET ORAL EVERY 6 HOURS PRN
Qty: 10 TABLET | Refills: 0 | Status: SHIPPED | OUTPATIENT
Start: 2025-05-23

## 2025-05-23 RX ORDER — HYDROCORTISONE 10 MG/1
20 TABLET ORAL DAILY
Qty: 60 TABLET | Refills: 0 | Status: SHIPPED | OUTPATIENT
Start: 2025-05-23

## 2025-05-23 RX ORDER — AMLODIPINE BESYLATE 10 MG/1
5 TABLET ORAL DAILY
Qty: 90 TABLET | Refills: 1 | Status: SHIPPED | OUTPATIENT
Start: 2025-05-23 | End: 2025-05-23

## 2025-05-23 RX ORDER — NICOTINE POLACRILEX 4 MG
15-30 LOZENGE BUCCAL
Status: DISCONTINUED | OUTPATIENT
Start: 2025-05-23 | End: 2025-05-23 | Stop reason: HOSPADM

## 2025-05-23 RX ORDER — DEXTROSE MONOHYDRATE 25 G/50ML
25-50 INJECTION, SOLUTION INTRAVENOUS
Status: DISCONTINUED | OUTPATIENT
Start: 2025-05-23 | End: 2025-05-23 | Stop reason: HOSPADM

## 2025-05-23 RX ADMIN — GABAPENTIN 300 MG: 300 CAPSULE ORAL at 13:53

## 2025-05-23 RX ADMIN — ENOXAPARIN SODIUM 40 MG: 40 INJECTION SUBCUTANEOUS at 07:53

## 2025-05-23 RX ADMIN — SODIUM CHLORIDE, SODIUM LACTATE, POTASSIUM CHLORIDE, AND CALCIUM CHLORIDE: .6; .31; .03; .02 INJECTION, SOLUTION INTRAVENOUS at 02:59

## 2025-05-23 RX ADMIN — HYDROCORTISONE 20 MG: 20 TABLET ORAL at 13:53

## 2025-05-23 RX ADMIN — ATORVASTATIN CALCIUM 40 MG: 40 TABLET, FILM COATED ORAL at 07:52

## 2025-05-23 RX ADMIN — GABAPENTIN 300 MG: 300 CAPSULE ORAL at 07:52

## 2025-05-23 RX ADMIN — HYDROCORTISONE 40 MG: 20 TABLET ORAL at 07:52

## 2025-05-23 ASSESSMENT — ACTIVITIES OF DAILY LIVING (ADL)
ADLS_ACUITY_SCORE: 43
ADLS_ACUITY_SCORE: 26
ADLS_ACUITY_SCORE: 43
ADLS_ACUITY_SCORE: 43
ADLS_ACUITY_SCORE: 26
ADLS_ACUITY_SCORE: 26
ADLS_ACUITY_SCORE: 43
ADLS_ACUITY_SCORE: 43
ADLS_ACUITY_SCORE: 26
ADLS_ACUITY_SCORE: 26

## 2025-05-23 NOTE — DISCHARGE SUMMARY
[facility] Discharge Summary    Geovanna Sepulveda MRN: [id number]   YOB: 1965 Age: 59 year old     Date of Admission:  5/22/2025  Date of Discharge::  5/22/2025  Admitting Physician:  Toni Beverly MD  Discharge Physician:  Toni Beverly MD  Primary Care Physician:         Sherry Jenkins          Discharge Diagnosis:   MACS and primary aldosteronism s/p adrenalectomy         Procedures:   Right laparoscopic adrenalectomy, posterior approach          Consultations:   ENDOCRINE NON-DIABETES ADULT IP CONSULT          HPI (from H&P):   This is a very pleasant 59-year-old female who works with  at a nursing home who presents with at least a 10-year history of significant hypertension and a history of spontaneous hypokalemia.  She has been worked up by her primary care physician and endocrinology and found to have an elevated aldosterone and suppressed renin level.  Given her spontaneous hypokalemia the patient has been deferred for saline suppression testing.  The patient does have a 2.1 cm right adrenal nodule.  As she was being evaluated for possible adrenal vein sampling it became clear that she also had nonsuppression using both a 1 mg and 8 mg dexamethasone suppression test.  In this context with a normal ACTH it likely is that she has cortisol hypersecretion.  For this purpose the patient has been referred for consideration of a right adrenalectomy given that if the left gland is hyper secreting aldosterone that could better be managed by a mineralocorticoid receptor antagonist whereas the outcomes for cortisol suppression on the right side are limited.  The patient does report significant delayed wound healing following carpal tunnel repair.  Further she has a history of prediabetes.            Hospital Course:   The patient underwent listed procedure(s) above, which she tolerated without complications. Endocrinology was consulted post-operatively who recommended a  hydrocortisone taper and stopping any potassium supplementation as well as stopping her BP medications. Overall unremarkable hospitalization.  At the time of discharge, she was tolerating PO intake, ambulating, voiding spontaneously without difficulty, and pain was controlled with oral pain medications. The patient was discharged home in stable and improved condition.         Final Pathology Result:   Pending at time of discharge         Pertinent Imaging Results:   CT renal mass w and wo contrast 10/30  IMPRESSION:  1.  Bosniak 1 and 2 bilateral renal cysts.  2.  2.1 cm right adrenal adenoma.  3.  Mosaic attenuation in the lung bases likely due to small airway dis         Medications Prior to Admission:     Medications Prior to Admission   Medication Sig Dispense Refill Last Dose/Taking    albuterol (PROAIR HFA/PROVENTIL HFA/VENTOLIN HFA) 108 (90 Base) MCG/ACT inhaler Inhale 2 puffs into the lungs every 6 hours as needed for shortness of breath, wheezing or cough. 18 g 3 5/21/2025    atorvastatin (LIPITOR) 40 MG tablet Take 1 tablet (40 mg) by mouth daily. (Patient taking differently: Take 40 mg by mouth every morning.) 90 tablet 1 5/22/2025 Morning    gabapentin (NEURONTIN) 300 MG capsule Take 1 capsule (300 mg) by mouth 3 times daily. 270 capsule 1 5/22/2025 Morning    hydrOXYzine makeda (VISTARIL) 25 MG capsule Take 1 capsule (25 mg) by mouth nightly as needed.   Past Month    VITAMIN D3 25 MCG tablet Take 1 tablet by mouth daily   More than a month    [DISCONTINUED] amLODIPine (NORVASC) 10 MG tablet Take 1 tablet (10 mg) by mouth daily. (Patient taking differently: Take 10 mg by mouth every morning.) 90 tablet 1 5/22/2025    [DISCONTINUED] spironolactone (ALDACTONE) 50 MG tablet Take 1 tablet (50 mg) by mouth daily. (Patient taking differently: Take 50 mg by mouth every morning.) 90 tablet 1 More than a month            Discharge Medications:     Current Discharge Medication List        START taking these  medications    Details   !! hydrocortisone (CORTEF) 10 MG tablet Take 2 tablets (20 mg) by mouth daily.  Qty: 60 tablet, Refills: 0    Associated Diagnoses: Adrenal nodule      !! hydrocortisone (CORTEF) 5 MG tablet Take 2 tablets (10 mg) by mouth daily.  Qty: 60 tablet, Refills: 0    Associated Diagnoses: Adrenal nodule      oxyCODONE (ROXICODONE) 5 MG tablet Take 1 tablet (5 mg) by mouth every 6 hours as needed for moderate pain.  Qty: 10 tablet, Refills: 0    Associated Diagnoses: Hyperaldosteronism       !! - Potential duplicate medications found. Please discuss with provider.        CONTINUE these medications which have NOT CHANGED    Details   albuterol (PROAIR HFA/PROVENTIL HFA/VENTOLIN HFA) 108 (90 Base) MCG/ACT inhaler Inhale 2 puffs into the lungs every 6 hours as needed for shortness of breath, wheezing or cough.  Qty: 18 g, Refills: 3    Comments: Pharmacy may dispense brand covered by insurance (Proair, or proventil or ventolin or generic albuterol inhaler)  Associated Diagnoses: Wheeze      atorvastatin (LIPITOR) 40 MG tablet Take 1 tablet (40 mg) by mouth daily.  Qty: 90 tablet, Refills: 1    Associated Diagnoses: Essential hypertension      gabapentin (NEURONTIN) 300 MG capsule Take 1 capsule (300 mg) by mouth 3 times daily.  Qty: 270 capsule, Refills: 1    Associated Diagnoses: Neuropathy      hydrOXYzine makeda (VISTARIL) 25 MG capsule Take 1 capsule (25 mg) by mouth nightly as needed.    Associated Diagnoses: Anxiety      VITAMIN D3 25 MCG tablet Take 1 tablet by mouth daily           STOP taking these medications       amLODIPine (NORVASC) 10 MG tablet Comments:   Reason for Stopping:         spironolactone (ALDACTONE) 50 MG tablet Comments:   Reason for Stopping:                    Day of Discharge Physical Exam:   Temp:  [97.8  F (36.6  C)-98.4  F (36.9  C)] 98  F (36.7  C)  Pulse:  [56-85] 60  Resp:  [12-19] 19  BP: (105-123)/(52-73) 116/73  SpO2:  [89 %-96 %] 96 %  General: A&O, NAD, lying  comfortably in bed  Chest: NLB on RA  Abd: Soft, ND, NT  Back: incisions c/d/I, no oozing. Steristrips over right most posterior incision   Extremities: WWP  Neuro: Moving all extremities spontaneously without apparent deficit         Discharge Instructions and Follow-Up:        Reason for your hospital stay    Right adrenalectomy     Activity    Your activity upon discharge: activity as tolerated and no lifting, driving, or strenuous exercise for 4 weeks     ADULT [facility] Specialty Follow-up and recommended labs and tests    Follow up: Follow up with Dr. Beverly , at (location with clinic name or city) the [facility] [place], in 2 weeks  to evaluate after surgery. No follow up labs or test are needed.    Appointments on [place] and/or [facility] (with [facility] or [facility] provider or service). Call [phone] if you haven't heard regarding these appointments within 7 days of discharge.     Discharge Instructions    After Bariatric Surgery Discharge Instructions  [facility]     Note: Ask your nurse to order your medications from the pharmacy. Be sure you have your medications with you when you leave.  Diet on discharge regular diet. .      How often should I do my deep breathing and coughing?  Use your incentive spirometer (small plastic breathing device) every hour while awake after you get home. Using the incentive spirometer helps you deep breath. Continuing to cough and deep breath will help prevent fevers and pneumonia.   If you do not have a fever after one week, you can stop using the incentive spirometer and discard it.     You can continue to take deep breaths without the incentive spirometer every one to two hours while awake for the month after surgery.    What kinds of activity can I do?  Get plenty of rest the first few days after surgery and try to balance rest and activity. You will need some time to recover - you may be more tired than you realize at first. You'll feel  better and heal faster if you take good care of yourself.  For 4 weeks after surgery (Please review restrictions at your one week visit, they could change based on how well you are doing):  -Don't lift more than 20 pounds.   -Take 4-5 short walks every day.  -Don't jog, run, or do belly exercises.  Don't swim, bathe or use a hot tub until your cuts are healed (scabs are gone).  You may shower  Don't plan to fly or take a road trip within the first 1 to 3 months after surgery.  You could get a blood clot in your legs. If you must travel, get up and move around every hour for at least 5 minutes before continuing on your journey.  Your care team can help you decide when it's safe for you to travel.     What can I do for pain control?  You had major belly surgery that involves all layers of your belly muscles. Pain is expected, even for some as far out as 6-8 weeks after surgery. Moving, sneezing, coughing, and breathing will cause discomfort because these activities use your belly muscles.   Please see your after visit summary medication review for what pain medication will be continued, discontinued and newly started for you.    You can take opioid pain medicine if prescribed and if needed. Try to wean off from it as soon as you feel comfortable.   Do not drive while you are taking opioid pain medicine. This is dangerous.  You can take acetaminophen (Tylenol) between your prescribed pain medicine on a scheduled basis OR take it scheduled every 6 hours (check with your care team for specifics).  -Do not take more than 3000 mg Tylenol in a 24 hour period.  -You may also take Tylenol for pain in place of the opioid pain medicine (check with your care team for specifics).   -You may also alternate taking ibuprofen for pain control. Do not exceed 3000mg per day.   You can apply ice or heat to the affected area(s). Just remember to wrap the ice in something and limit icing sessions to 20 minutes. Excessive icing can  irritate the skin or cause skin damage.  You can apply heat with a hot, wet towel or heating pad. Just like cold therapy, limit heat application to 20 minutes. Never sleep with a heating pad on. It could cause severe burns to your skin.  Wear your binder to support your belly muscles if you have one.  Take this off a little more each day and try to be off completely by 2 weeks after surgery. If you don't need your binder for comfort or support, you don't need to wear it.   You may not be able to sleep in a comfortable position for a few weeks after surgery. This is normal. You may be more comfortable sleeping in a recliner or propped up with 3 pillows for the first couple of weeks after surgery.    Please call the clinic for any of the following pain concerns, we would like to talk to you:  -pain that does not improve with rest  -pain that gets worse and worse  -pain that is not controlled by your pain medicine  -a sudden severe increase in pain    What should I know about my incisions (cuts)?  Your incisions are covered with white steri strips or butterfly tape and have band aids or gauze over the top. If you have gauze or band aids, they can come off in the hospital.  Leave your steri strips on until they fall off on their own. If the steri strips don't fall off after 1 to 2 weeks, you can take them off. If they fall off earlier, replace them with clean band aids trying to avoid touching the incision itself.   If you have gauze covered by a clear dressing, remove 2-3 days after surgery or as directed by your care team.  You may shower in the hospital after surgery and can get your incision coverings wet.  Do not submerge in water (e.g. No baths, swimming pools, hot tubs) until your care team tells you it is okay and your incisions are completely healed.    Call the clinic if you have any of these signs or symptoms of infection:  -Redness around the site.  -Drainage that smells bad.  -Drainage that is thick yellow  "or green.  -An increase in pain around the incision site  -An increase in swelling around the incision site  -Heat or warmth around incision site   -Fever of 101.5  F (38.3  C) or higher when taken under the tongue.    -Chills    Managing bowel movements:  If you have not had a bowel movement for at least three days after your surgery date and are passing gas, you can use over the counter stool softeners.  Please stop taking the stool softeners and laxatives if your stools are loose.  Increasing fluids and activity as well as getting off narcotics will help prevent constipation.    Endocrinology recommendations:   Please take 20mg of hydrocortisone (2 10mg tabs) in the morning, and 10mg 6 hours later (mid day) daily as described by the endocrinology team until they follow up with you.   Please do not take any BP medications or potassium supplementation  No need to check your blood sugar or give yourself any insulin.     Call the clinic if:   -You have stomach pain.   -You continue to have constipation.  -You have excessive bloating after walking and passing gas.    Call the clinic if you have any of these signs or symptoms of dehydration:  -Dark colored urine  -Urinating (pass water) less than 2-3 times per day  -Lack of energy  -Nausea  -Dizziness  -Headache    Call the clinic ANY TIME at [phone] if:  -Your pain medicine is not working.  -You have a fever = 101.5 F.  -You have belly or left shoulder pain that gets worse and worse.  -You have a swollen leg with redness, warmth, or pain behind the knee or calf.  -You have chest pain   -You feel very short of breath.  -You have a sudden severe increase in heart rate.  -You have vomiting that gets worse and worse.  -You have constant nausea (feeling sick to your stomach) that does not go away with medication.  -You have trouble swallowing.  -You have an increasing feeling that \"something is not right\".  -You have hiccups that do not stop.  -You have any questions " or concerns.    AFTER HOURS QUESTIONS OR CONCERNS: Call [phone] and ask to speak with surgery resident if you are having troubles in the evenings, at night, or on weekends. Please call if you experience increasing abdominal pain, nausea, vomiting, increasing drainage from your wounds, chills, or fever >101.5    If you have to go to the Emergency Room, we prefer you go to the hospital that did your surgery. Please let them know that you had bariatric surgery and to notify your surgeon.    When should I go back to the clinic?  Follow up with your care team in 1-2 weeks.   If this appointment was not already made, please call: [phone]    Appointments located at [facility] clinic:  [facility] ([facility])    [address]  [place] [address]     Diet    Follow this diet upon discharge: Current Diet:Orders Placed This Encounter      Regular Diet Adult     Hospital Follow-up with Existing Primary Care Provider (PCP)            Condition at discharge: Stable      - - - - - - - - - - - - - - - - - -  Norma Means MD  PGY-7 General Surgery

## 2025-05-23 NOTE — PLAN OF CARE
Goal Outcome Evaluation:      Plan of Care Reviewed With: patient          Outcome Evaluation: Patient verbalized understanding of After Visit Summary. PIV removed, personal belongings packed, and walked to discharge pharmacy to  medications.

## 2025-05-23 NOTE — PLAN OF CARE
"/64 (BP Location: Right arm)   Pulse 57   Temp 98.2  F (36.8  C) (Oral)   Resp 18   Ht 1.549 m (5' 1\")   Wt 68.7 kg (151 lb 7.3 oz)   SpO2 95%   BMI 28.62 kg/m      Outcome Evaluation: A&O x 4. Reports right flank pain 3/10. Pain medication offered but patient refused. Right flank lapsites x 3- dermabonded- ESEQUIEL. PIV infusing  mL/hr. PIV SL. SBA. Not passing flatus. Voiding. LBM 5/22/2025 prior to procedure. Possible discharge home today.       Goal Outcome Evaluation: Ongoing       Plan of Care Reviewed With: patient    Overall Patient Progress: no change          Problem: Adult Inpatient Plan of Care  Goal: Absence of Hospital-Acquired Illness or Injury  Intervention: Identify and Manage Fall Risk  Recent Flowsheet Documentation  Taken 5/22/2025 2129 by Swathi Hinds RN  Safety Promotion/Fall Prevention:   activity supervised   assistive device/personal items within reach   clutter free environment maintained   lighting adjusted   nonskid shoes/slippers when out of bed   room organization consistent   safety round/check completed     Problem: Adult Inpatient Plan of Care  Goal: Absence of Hospital-Acquired Illness or Injury  Intervention: Prevent Skin Injury  Recent Flowsheet Documentation  Taken 5/22/2025 2129 by Swathi Hinds RN  Body Position: position changed independently  Skin Protection:   adhesive use limited   tubing/devices free from skin contact     Problem: Adult Inpatient Plan of Care  Goal: Absence of Hospital-Acquired Illness or Injury  Intervention: Prevent and Manage VTE (Venous Thromboembolism) Risk  Recent Flowsheet Documentation  Taken 5/22/2025 2129 by Swathi Hinds RN  VTE Prevention/Management: SCDs on (sequential compression devices)     Problem: Adult Inpatient Plan of Care  Goal: Optimal Comfort and Wellbeing  Intervention: Monitor Pain and Promote Comfort  Recent Flowsheet Documentation  Taken 5/22/2025 2129 by Swathi Hinds RN  Pain Management Interventions: " medication (see MAR)     Problem: Surgery Nonspecified  Goal: Anesthesia/Sedation Recovery  Intervention: Optimize Anesthesia Recovery  Recent Flowsheet Documentation  Taken 5/22/2025 2129 by Swathi Hinds, RN  Safety Promotion/Fall Prevention:   activity supervised   assistive device/personal items within reach   clutter free environment maintained   lighting adjusted   nonskid shoes/slippers when out of bed   room organization consistent   safety round/check completed

## 2025-05-23 NOTE — PROGRESS NOTES
"Endocrine Progress Note  Patient: Geovanna Sepulveda   MRN: [id number]  Date of Service: 05/23/2025    Subjective:  Patient is seen in the bedside, blood pressures are stable, normal without medication. K supplementation was discontinue and labs within normal limits. Patient mentions mild pain in the scar, no oozing or other complaints. She feels otherwise good.       Physical Examination:  /73 (BP Location: Left arm)   Pulse 60   Temp 98  F (36.7  C) (Oral)   Resp 19   Ht 1.549 m (5' 1\")   Wt 68.7 kg (151 lb 7.3 oz)   SpO2 96%   BMI 28.62 kg/m      Physical Exam   General: No respiratory distress, no anxiety or tremors.   GENERAL :  In no apparent distress  EYES:  No scleral icterus,  No proptosis, conjunctival redness  RESP: Lungs expanding without difficulties.   CARDIAC: Regular rate and rhythm  ABD: back side scar, glue close. Mild bruses. No other bruises in her body.   NEURO: awake, alert, responds appropriately to questions.    EXTREMITIES: No clubbing, cyanosis or edema.    Medications:    Scheduled:   atorvastatin, 40 mg, QAM  enoxaparin ANTICOAGULANT, 40 mg, Q24H  gabapentin, 300 mg, TID  hydrocortisone, 20 mg, Once  insulin aspart, 1-7 Units, TID AC  insulin aspart, 1-5 Units, At Bedtime  sodium chloride (PF), 3 mL, Q8H VITO        Endocrine Labs     Most Recent 3 CBC's:  Recent Labs   Lab Test 05/22/25  1104 05/22/25  1023 05/22/25  0845 05/22/25  0559 05/07/25  0858 04/24/25  1016   WBC  --   --   --  10.6 7.8 8.8   HGB 12.3 13.1 12.4 13.6 12.9 13.2   MCV  --   --   --  90 89 88   PLT  --   --   --  336 333 368      Most Recent 3 BMP's:  Recent Labs   Lab Test 05/23/25  0834 05/23/25  0654 05/23/25  0649 05/22/25  1913 05/22/25  1150 05/22/25  1147 05/22/25  1104 05/22/25  0627 05/22/25  0559   NA  --  139  --  139  --   --  141   < > 138   POTASSIUM  --  4.0  --  4.3  --   --  3.9   < > 4.2   CHLORIDE  --  104  --  104  --   --   --   --  104   CO2  --  26  --  25  --   --   --   --  22   BUN "  --  21.1*  --  27.3*  --   --   --   --  32.5*   CR  --  0.81  --  0.84  --  0.89  --   --  0.83   ANIONGAP  --  9  --  10  --   --   --   --  12   MIRNA  --  9.0  --  8.8  --   --   --   --  9.5   * 105* 109* 132*   < >  --  174*   < > 109*    < > = values in this interval not displayed.     Most Recent 2 LFT's:  Recent Labs   Lab Test 05/22/25  0559 12/03/24 2026   AST 23 25   ALT 26 20   ALKPHOS 75 72   BILITOTAL 0.3 0.5     Most Recent Cholesterol Panel:  Recent Labs   Lab Test 04/09/25  0931   CHOL 171   *   HDL 43*   TRIG 123     Most Recent TSH, T4 and A1c Labs:  Recent Labs   Lab Test 04/09/25  0931   A1C 5.7*     Most Recent 6 glucoses:  Recent Labs   Lab Test 05/23/25  0834 05/23/25  0654 05/23/25  0649 05/22/25  1913 05/22/25  1150 05/22/25  1104   * 105* 109* 132* 133* 174*         HPI:     Geovanna Sepulveda is a 59-year-old female with a history of hypertension and prediabetes (HbA1c 5.7% on 4/9/2025), who was found to have a ~2 cm right adrenal nodule on CT (10/30/2024). Her biochemical evaluation showed primary aldosteronism (PA) based on multiple elevated plasma aldosterone concentrations (PAC) and suppressed plasma renin activity (PRA <1 ng/mL/hr), along with prior hypokalemia. She also demonstrated findings consistent with mild autonomous cortisol secretion (MACS), including two separate dexamethasone suppression tests (1 mg and 8 mg) with suppressed ACTH and post-DST cortisol levels of 2.9 and 2.0 mcg/dL, respectively. DHEA-S was down twice (64 mcg/dL in November 2024 and 57 mcg/dL in March 2025).    She underwent a right adrenalectomy via posterior laparoscopic approach on 5/22/2025. She received IV dexamethasone 4 mg on the morning of surgery. Postoperatively, her blood pressure has been stable without antihypertensives, and her serum potassium normalized. She is ambulating, tolerating oral intake, and reports no lightheadedness or nausea. Labs reviewed post-op day 1 show  grossly normal BMP and glucose in the 105-132 mg/dL range.    ASSESSMENT & PLAN:     Post-Right Adrenalectomy (for PA and MACS)  Successfully underwent adrenalectomy on 5/22/2025.  Histopathologic confirmation pending; clinical diagnosis of PA and MACS presumed based on biochemical and imaging findings.  No antihypertensives restarted yet; she has remained normotensive.  Potassium has normalized (K+ 4.0-4.3 mmol/L). Na 139    Plan:  Discontinue potassium supplements if continued outpatient.  Discontinue mineralocorticoid receptor antagonists (MRAs), ACE inhibitors, and ARBs (not accurately reflected in Epic med list); reduce other BP agents by 50% if resuming.  Monitor blood pressure closely post-discharge and adjust antihypertensive regimen accordingly.  Continue daily BMP monitoring through hospitalization to assess potassium serum weekly, it can be done outpatient.    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  Prior DSTs showed cortisol >1.8 mcg/dL with undetectable ACTH, supporting MACS and possible chronic suppression of the HPA axis.    Plan:  Will start empiric glucocorticoid replacement on today, 5/23/2025:  - Hydrocortisone 40 mg AM (~8 AM) and 20 mg ~2 PM, today.        - Tomorrow Hydrocortisone 20 mg at 8am and 10 mg at 2PM. Patient can be continued with this medication until visit Endocrinologist.         - Monitor for clinical signs of adrenal insufficiency (fatigue, hypotension, hypoglycemia).        - Plan for outpatient hydrocortisone taper and consider formal HPA axis testing few weeks post-op (AM cortisol, ACTH).    Glucose Management (Prediabetes and perioperative hyperglycemia)  Patient has prediabetes (A1c 5.7%). Mild perioperative hyperglycemia noted (peak 174 mg/dL), likely stress-related and steroid-induced.  Currently on sliding scale insulin, but no need for chronic  antihyperglycemic therapy.    Plan:  Continue with Medium insulin resistance dose pre-meals and bedtime.         No medications at discharge        Roma Love MD   Endocrinology Fellow   Page # [phone]  On Vocera    Case was discussed and reviewed with Endocrinology Attending Dr. Serna.

## 2025-05-23 NOTE — PROGRESS NOTES
General Surgery Progress Note     Subjective:  NAEON. Feeling well this am. Pain controlled without pain medication. Ambulating to the bathroom without any lightheadedness. Passing flatus. Tolerating clear liquids with no nausea or vomiting. Feeling ready to go home later today. Voiding several times with clear urine.     Objective:  Temp:  [97.8  F (36.6  C)-98.7  F (37.1  C)] 98  F (36.7  C)  Pulse:  [56-85] 60  Resp:  [12-19] 19  BP: (105-129)/() 116/73  MAP:  [85 mmHg-98 mmHg] 85 mmHg  Arterial Line BP: (109-136)/(68-75) 109/75  SpO2:  [89 %-100 %] 96 %  I/O last 3 completed shifts:  In: 3373.34 [P.O.:640; I.V.:2733.34]  Out: 215 [Urine:200; Blood:15]    Gen: NAD  Resp: NLB on RA  CV: WWP  Abd: Soft, ND, NT, incision c/d/I, no further oozing present.   Ext: no gross deformities    BMP: grossly normal     A/P: Geovanna Sepulveda is a 59 year old female with a PMH of HTN, prediabetes and right adrenal nodule with mild autonomous cortisol secretion (MACS) and primary aldosteronism who is s/p right laparoscopic adrenalectomy,  posterior approach on 5/22.     - advance to regular diet  - discontinue IVF  - lovenox  - encourage ambulation   - appreciate endocrinology recs: hydrocortisone 40mg this am and 20mg at 2pm this afternoon. BMP appears grossly normal. Plan on outpatient hydrocortisone taper.   - possible discharge this afternoon based on endocrinology recommendations and if diet is tolerated.     Discussed with staff    Norma Means MD  PGY-7 General Surgery

## 2025-05-23 NOTE — PROGRESS NOTES
Post operative note  Patient doing well  Pain managed  Temp: 98.3  F (36.8  C) Temp src: Oral BP: 109/52 (MAP 68) Pulse: 64   Resp: 16 SpO2: 94 % O2 Device: None (Room air) Oxygen Delivery: 6 LPM  5mm Incision with tiny ooze -stopped covered with bandage   Appreciated endocrine recommedations  Advance diet.

## 2025-05-24 ENCOUNTER — MYC MEDICAL ADVICE (OUTPATIENT)
Dept: SURGERY | Facility: CLINIC | Age: 60
End: 2025-05-24
Payer: COMMERCIAL

## 2025-05-27 ENCOUNTER — PATIENT OUTREACH (OUTPATIENT)
Dept: ENDOCRINOLOGY | Facility: CLINIC | Age: 60
End: 2025-05-27
Payer: COMMERCIAL

## 2025-05-27 DIAGNOSIS — I10 ESSENTIAL HYPERTENSION: Primary | ICD-10-CM

## 2025-05-27 DIAGNOSIS — E26.9 HIGH ALDOSTERONE: ICD-10-CM

## 2025-05-27 NOTE — PROGRESS NOTES
Called and left voicemail message to Follow-up on blood pressure.  Will also have patient get blood work done tomorrow and will adjust medication accordingly.

## 2025-05-27 NOTE — PROGRESS NOTES
Post op call attempted x1, call back information left on   Dr Beverly updated on current HR and BP information patient sent in [facility]

## 2025-05-28 LAB
PATH REPORT.COMMENTS IMP SPEC: NORMAL
PATH REPORT.FINAL DX SPEC: NORMAL
PATH REPORT.GROSS SPEC: NORMAL
PATH REPORT.RELEVANT HX SPEC: NORMAL
PHOTO IMAGE: NORMAL

## 2025-05-28 NOTE — PROGRESS NOTES
RN Post-Op/Post-Discharge Care Coordination Note    Ms. Geovanna Sepulveda is a 59 year old female who underwent adrenalectomy on 5/22 with  Dr. Toni Beverly.  Spoke with Patient.    Support  Patient able to care for self independently     Health Status  Nausea/Vomiting: Patient denies nausea/vomiting.  Eating/drinking: Patient is able to eat and drink without any complaints.  Diet:  Regular  Bowel habits: Patient reports no bowel movement since surgery. Taking Miralax  Drains (RICHELLE): N/A  Fevers/chills: Patient denies any fever or chills.  Incisions: Patient denies any signs and symptoms of infection..  Wound closure:  Skin Sealant  Pain: tolerable  New Medications:  Oxycodone    Activity/Restrictions  Discussed importance of early mobility after surgery. Suggested getting up and walking and at a minimum walking room to room every hour.  Adherence of the following restrictions:   Other NO lifting >10 lbsx 4 weeks    Equipment  None    Pathology reviewed with patient:  No, not yet available    Forms/Letters  Yes    All of her questions were answered including reviewing restrictions, and wound care.  She will call this office if she has any further questions and/or concerns.          A copy of this note was routed to the primary surgeon.      Whom and When to Call  Patient acknowledges understanding of how to manage any medication changes and   when to seek medical care.     Patient advised that if after hour medical concerns arise to please call [phone] and choose option 4 to speak to the physician on call.

## 2025-05-30 ENCOUNTER — LAB (OUTPATIENT)
Dept: LAB | Facility: CLINIC | Age: 60
End: 2025-05-30
Payer: COMMERCIAL

## 2025-05-30 DIAGNOSIS — I10 ESSENTIAL HYPERTENSION: ICD-10-CM

## 2025-05-30 DIAGNOSIS — E26.9 HIGH ALDOSTERONE: ICD-10-CM

## 2025-05-30 LAB
ANION GAP SERPL CALCULATED.3IONS-SCNC: 13 MMOL/L (ref 7–15)
BUN SERPL-MCNC: 29.4 MG/DL (ref 6–20)
CALCIUM SERPL-MCNC: 9.9 MG/DL (ref 8.8–10.4)
CHLORIDE SERPL-SCNC: 103 MMOL/L (ref 98–107)
CREAT SERPL-MCNC: 0.96 MG/DL (ref 0.51–0.95)
EGFRCR SERPLBLD CKD-EPI 2021: 68 ML/MIN/1.73M2
GLUCOSE SERPL-MCNC: 100 MG/DL (ref 70–99)
HCO3 SERPL-SCNC: 25 MMOL/L (ref 22–29)
POTASSIUM SERPL-SCNC: 4.8 MMOL/L (ref 3.4–5.3)
SODIUM SERPL-SCNC: 141 MMOL/L (ref 135–145)

## 2025-05-30 PROCEDURE — 36415 COLL VENOUS BLD VENIPUNCTURE: CPT

## 2025-05-30 PROCEDURE — 80048 BASIC METABOLIC PNL TOTAL CA: CPT

## 2025-06-03 ENCOUNTER — RESULTS FOLLOW-UP (OUTPATIENT)
Dept: OBGYN | Facility: CLINIC | Age: 60
End: 2025-06-03

## 2025-06-04 DIAGNOSIS — I10 ESSENTIAL HYPERTENSION: Primary | ICD-10-CM

## 2025-06-04 RX ORDER — AMLODIPINE BESYLATE 5 MG/1
5 TABLET ORAL DAILY
Qty: 30 TABLET | Refills: 0 | Status: SHIPPED | OUTPATIENT
Start: 2025-06-04 | End: 2025-07-04

## 2025-06-04 NOTE — PROGRESS NOTES
BP average  139/77 (132//74)    Will start amlodipine 5 mg daily  Labs and Follow-up in 1 month,  Goal //80.

## 2025-06-07 ENCOUNTER — LAB (OUTPATIENT)
Dept: LAB | Facility: CLINIC | Age: 60
End: 2025-06-07
Payer: COMMERCIAL

## 2025-06-07 DIAGNOSIS — I10 ESSENTIAL HYPERTENSION: ICD-10-CM

## 2025-06-07 LAB
ANION GAP SERPL CALCULATED.3IONS-SCNC: 11 MMOL/L (ref 7–15)
BUN SERPL-MCNC: 32.3 MG/DL (ref 6–20)
CALCIUM SERPL-MCNC: 9.3 MG/DL (ref 8.8–10.4)
CHLORIDE SERPL-SCNC: 105 MMOL/L (ref 98–107)
CREAT SERPL-MCNC: 0.94 MG/DL (ref 0.51–0.95)
EGFRCR SERPLBLD CKD-EPI 2021: 70 ML/MIN/1.73M2
GLUCOSE SERPL-MCNC: 120 MG/DL (ref 70–99)
HCO3 SERPL-SCNC: 21 MMOL/L (ref 22–29)
POTASSIUM SERPL-SCNC: 4.5 MMOL/L (ref 3.4–5.3)
SODIUM SERPL-SCNC: 137 MMOL/L (ref 135–145)

## 2025-06-07 PROCEDURE — 36415 COLL VENOUS BLD VENIPUNCTURE: CPT

## 2025-06-07 PROCEDURE — 82435 ASSAY OF BLOOD CHLORIDE: CPT

## 2025-06-08 ENCOUNTER — RESULTS FOLLOW-UP (OUTPATIENT)
Dept: ENDOCRINOLOGY | Facility: CLINIC | Age: 60
End: 2025-06-08
Payer: COMMERCIAL

## 2025-06-08 DIAGNOSIS — I10 ESSENTIAL HYPERTENSION: Primary | ICD-10-CM

## 2025-06-11 ENCOUNTER — HOSPITAL ENCOUNTER (OUTPATIENT)
Dept: MAMMOGRAPHY | Facility: CLINIC | Age: 60
Discharge: HOME | End: 2025-06-11
Attending: NURSE PRACTITIONER
Payer: COMMERCIAL

## 2025-06-11 ENCOUNTER — LAB (OUTPATIENT)
Dept: LAB | Facility: CLINIC | Age: 60
End: 2025-06-11
Payer: COMMERCIAL

## 2025-06-11 DIAGNOSIS — I10 ESSENTIAL HYPERTENSION: ICD-10-CM

## 2025-06-11 DIAGNOSIS — Z12.31 SCREENING MAMMOGRAM, ENCOUNTER FOR: ICD-10-CM

## 2025-06-11 LAB
ANION GAP SERPL CALCULATED.3IONS-SCNC: 11 MMOL/L (ref 7–15)
BUN SERPL-MCNC: 35.6 MG/DL (ref 6–20)
CALCIUM SERPL-MCNC: 9.9 MG/DL (ref 8.8–10.4)
CHLORIDE SERPL-SCNC: 104 MMOL/L (ref 98–107)
CREAT SERPL-MCNC: 1.02 MG/DL (ref 0.51–0.95)
EGFRCR SERPLBLD CKD-EPI 2021: 63 ML/MIN/1.73M2
GLUCOSE SERPL-MCNC: 181 MG/DL (ref 70–99)
HCO3 SERPL-SCNC: 24 MMOL/L (ref 22–29)
POTASSIUM SERPL-SCNC: 4.5 MMOL/L (ref 3.4–5.3)
SODIUM SERPL-SCNC: 139 MMOL/L (ref 135–145)

## 2025-06-11 PROCEDURE — 80048 BASIC METABOLIC PNL TOTAL CA: CPT

## 2025-06-11 PROCEDURE — 36415 COLL VENOUS BLD VENIPUNCTURE: CPT

## 2025-06-11 PROCEDURE — 77063 BREAST TOMOSYNTHESIS BI: CPT

## 2025-06-11 NOTE — TELEPHONE ENCOUNTER
Right adrenalectomy 5/22.  Patient is having some right lower quadrant pain, denies back pain, denies fever. Incisions look normal.  Has been having daily bowel movements.  Patient has not been taking anything for pain.  Patient advised to take Tylenol every 6 hours as needed for pain.  Dr Beverly aware of above.  Patient will call if she has further questions/concerns.

## 2025-06-12 ENCOUNTER — PATIENT OUTREACH (OUTPATIENT)
Dept: GASTROENTEROLOGY | Facility: CLINIC | Age: 60
End: 2025-06-12
Payer: COMMERCIAL

## 2025-06-14 ENCOUNTER — RESULTS FOLLOW-UP (OUTPATIENT)
Dept: MULTI SPECIALTY CLINIC | Facility: CLINIC | Age: 60
End: 2025-06-14

## 2025-06-14 DIAGNOSIS — I10 ESSENTIAL HYPERTENSION: Primary | ICD-10-CM

## 2025-06-25 ENCOUNTER — OFFICE VISIT (OUTPATIENT)
Dept: SURGERY | Facility: CLINIC | Age: 60
End: 2025-06-25
Payer: COMMERCIAL

## 2025-06-25 VITALS
HEIGHT: 61 IN | HEART RATE: 56 BPM | DIASTOLIC BLOOD PRESSURE: 88 MMHG | BODY MASS INDEX: 30.06 KG/M2 | OXYGEN SATURATION: 97 % | WEIGHT: 159.2 LBS | SYSTOLIC BLOOD PRESSURE: 155 MMHG

## 2025-06-25 DIAGNOSIS — E26.9 ALDOSTERONISM: Primary | ICD-10-CM

## 2025-06-25 PROCEDURE — 1125F AMNT PAIN NOTED PAIN PRSNT: CPT | Performed by: SURGERY

## 2025-06-25 PROCEDURE — 3079F DIAST BP 80-89 MM HG: CPT | Performed by: SURGERY

## 2025-06-25 PROCEDURE — 3077F SYST BP >= 140 MM HG: CPT | Performed by: SURGERY

## 2025-06-25 PROCEDURE — 99024 POSTOP FOLLOW-UP VISIT: CPT | Performed by: SURGERY

## 2025-06-25 ASSESSMENT — PAIN SCALES - GENERAL: PAINLEVEL_OUTOF10: MILD PAIN (1)

## 2025-06-25 NOTE — PROGRESS NOTES
This is a 59-year-old female who underwent a right adrenalectomy through the retroperitoneal approach on May 22, 2025 for both mild autonomous cortical secretion as well as primary aldosteronism.  The patient has done well.  Initially she had some issues moving her bowels and some issues with pain this is since resolved.  Her hydrocortisone stopped yesterday she was previously on 20/10.  Her blood pressure has been well-controlled being less than the target of 130/80.  She did need to be started on 5 mg of amlodipine shortly after surgery.  Surgery on the right side these have since resolved.  She is tolerating this well.  When she did come in today we noted her blood pressure to be 150 we will recheck this.  We will connect with the endocrine team today to see if she should actually be on a continued taper dose and prescribe this.  The patient understands this plan would like to proceed.  On examination her abdomen is soft nontender her incision on the right side are healed.  She did report some paresthesias following

## 2025-06-25 NOTE — PROGRESS NOTES
"Chief Complaint   Patient presents with    Surgical Followup     Post-op, DOS 5/22/25       Vitals:    06/25/25 0925   BP: (!) 155/88   Pulse: 56   SpO2: 97%   Weight: 72.2 kg (159 lb 3.2 oz)   Height: 1.549 m (5' 1\")       Body mass index is 30.08 kg/m .                          Birgit Bond RN    "

## 2025-06-25 NOTE — LETTER
"6/25/2025       RE: Geovanna Sepulveda  [address]  [place] [address]     Dear Colleague,    Thank you for referring your patient, Geovanna Sepulveda, to the [facility] [place] at [facility]. Please see a copy of my visit note below.    Chief Complaint   Patient presents with     Surgical Followup     Post-op, DOS 5/22/25       Vitals:    06/25/25 0925   BP: (!) 155/88   Pulse: 56   SpO2: 97%   Weight: 72.2 kg (159 lb 3.2 oz)   Height: 1.549 m (5' 1\")       Body mass index is 30.08 kg/m .                          Birgit Bond RN      This is a 59-year-old female who underwent a right adrenalectomy through the retroperitoneal approach on May 22, 2025 for both mild autonomous cortical secretion as well as primary aldosteronism.  The patient has done well.  Initially she had some issues moving her bowels and some issues with pain this is since resolved.  Her hydrocortisone stopped yesterday she was previously on 20/10.  Her blood pressure has been well-controlled being less than the target of 130/80.  She did need to be started on 5 mg of amlodipine shortly after surgery.  Surgery on the right side these have since resolved.  She is tolerating this well.  When she did come in today we noted her blood pressure to be 150 we will recheck this.  We will connect with the endocrine team today to see if she should actually be on a continued taper dose and prescribe this.  The patient understands this plan would like to proceed.  On examination her abdomen is soft nontender her incision on the right side are healed.  She did report some paresthesias following    Again, thank you for allowing me to participate in the care of your patient.      Sincerely,    Toni Beverly MD    "

## 2025-06-29 ENCOUNTER — LAB (OUTPATIENT)
Dept: LAB | Facility: CLINIC | Age: 60
End: 2025-06-29
Payer: COMMERCIAL

## 2025-06-29 DIAGNOSIS — I10 ESSENTIAL HYPERTENSION: ICD-10-CM

## 2025-06-29 LAB
ANION GAP SERPL CALCULATED.3IONS-SCNC: 12 MMOL/L (ref 7–15)
BUN SERPL-MCNC: 35.2 MG/DL (ref 6–20)
CALCIUM SERPL-MCNC: 9.7 MG/DL (ref 8.8–10.4)
CHLORIDE SERPL-SCNC: 104 MMOL/L (ref 98–107)
CREAT SERPL-MCNC: 1.08 MG/DL (ref 0.51–0.95)
EGFRCR SERPLBLD CKD-EPI 2021: 59 ML/MIN/1.73M2
GLUCOSE SERPL-MCNC: 114 MG/DL (ref 70–99)
HCO3 SERPL-SCNC: 23 MMOL/L (ref 22–29)
POTASSIUM SERPL-SCNC: 4.8 MMOL/L (ref 3.4–5.3)
SODIUM SERPL-SCNC: 139 MMOL/L (ref 135–145)

## 2025-06-29 PROCEDURE — 80048 BASIC METABOLIC PNL TOTAL CA: CPT

## 2025-06-29 PROCEDURE — 84244 ASSAY OF RENIN: CPT

## 2025-06-29 PROCEDURE — 36415 COLL VENOUS BLD VENIPUNCTURE: CPT

## 2025-07-02 LAB — RENIN PLAS-CCNC: 0.9 NG/ML/HR

## 2025-07-08 ENCOUNTER — OFFICE VISIT (OUTPATIENT)
Dept: ENDOCRINOLOGY | Facility: CLINIC | Age: 60
End: 2025-07-08
Payer: COMMERCIAL

## 2025-07-08 VITALS
DIASTOLIC BLOOD PRESSURE: 81 MMHG | HEART RATE: 80 BPM | OXYGEN SATURATION: 96 % | SYSTOLIC BLOOD PRESSURE: 120 MMHG | WEIGHT: 159.3 LBS | BODY MASS INDEX: 30.1 KG/M2

## 2025-07-08 DIAGNOSIS — I10 ESSENTIAL HYPERTENSION: ICD-10-CM

## 2025-07-08 DIAGNOSIS — E27.9 ADRENAL NODULE: Primary | ICD-10-CM

## 2025-07-08 RX ORDER — AMLODIPINE BESYLATE 5 MG/1
5 TABLET ORAL DAILY
Qty: 90 TABLET | Refills: 0 | Status: SHIPPED | OUTPATIENT
Start: 2025-07-08 | End: 2025-10-06

## 2025-07-08 NOTE — LETTER
7/8/2025      Geovanna Sepulveda  [address]  [place] [address]      Dear Colleague,    Thank you for referring your patient, Geovanna Sepulveda, to the [facility] [place]. Please see a copy of my visit note below.    Endocrinology Clinic Visit       Geovanna Sepulveda is a 59 year old female with HTN who is here for Follow-up adrenal nodule with MACE and hyperaldosteronism s/p right adrenalectomy 5/22/2025    Interval History  At home 130s/80s on amlodipine 5 mg  When no taking amlodipine, BP 150s  Energy level is good    Initial visit  Pt started to have high BP 2010,   Pt saw primary Sherry CAMPOVERDE CNP, secondary causes of hypertension were checked and found to have primary hyperaldosteronism.    Pt has been off amlodipine and potassium for 1 month  Losartan decrease 0.5 tablet and increase spironolactone to 2 tablets 1 week ago    Dad and sisters with high BP  No FH of stroke/ heart attack  Mom with ovarian cancer  Sister with ovarian mass  Another sister with heart stent 2/2 blockage    [place] to the [place]    Pt works in a factory   with stroke and passed away 2014.       REVIEW OF SYSTEMS  10 point negative except as mentioned in HPI    Past Medical/Surgical History:  Past Medical History:   Diagnosis Date     Hypertension 2012     Past Surgical History:   Procedure Laterality Date     CARPAL TUNNEL RELEASE RT/LT Bilateral 12/2020    [facility]     GYN SURGERY  2005    laparoscopy to clean fallopian tube in the [facility]     LAPAROSCOPIC ADRENALECTOMY Right 5/22/2025    Procedure: ADRENALECTOMY, LAPAROSCOPIC, posterior approach;  Surgeon: Toni Beverly MD;  Location:  OR       Medications  Current Outpatient Medications   Medication Sig Dispense Refill     albuterol (PROAIR HFA/PROVENTIL HFA/VENTOLIN HFA) 108 (90 Base) MCG/ACT inhaler Inhale 2 puffs into the lungs every 6 hours as needed for shortness of breath, wheezing or cough. 18 g 3      amLODIPine (NORVASC) 5 MG tablet Take 1 tablet (5 mg) by mouth daily. 90 tablet 0     atorvastatin (LIPITOR) 40 MG tablet Take 1 tablet (40 mg) by mouth daily. 90 tablet 1     gabapentin (NEURONTIN) 300 MG capsule Take 1 capsule (300 mg) by mouth 3 times daily. 270 capsule 1     hydrocortisone (CORTEF) 5 MG tablet Take 3 tablets (15 mg) by mouth every morning AND 1 tablet (5 mg) daily at 2 pm. 120 tablet 0     hydrOXYzine makeda (VISTARIL) 25 MG capsule Take 1 capsule (25 mg) by mouth nightly as needed.       VITAMIN D3 25 MCG tablet Take 1 tablet by mouth daily       No current facility-administered medications for this visit.       Allergies  Allergies   Allergen Reactions     Other Environmental Allergy Itching     Sulfamethoxazole-Trimethoprim Rash         Family History  family history includes Breast Cancer in her maternal aunt; Coronary Artery Disease in her sister; Diabetes Type 2  in her sister; Hyperlipidemia in her father and sister; Hypertension in her father; Other - See Comments in her sister; Ovarian Cancer (age of onset: 60) in her mother.    Social History  Social History     Tobacco Use     Smoking status: Never     Passive exposure: Never     Smokeless tobacco: Never   Substance Use Topics     Alcohol use: Never       Physical Exam  /81 (BP Location: Left arm)   Pulse 80   Wt 72.3 kg (159 lb 4.8 oz)   SpO2 96%   BMI 30.10 kg/m    Body mass index is 30.1 kg/m .  GENERAL :  In no apparent distress  EYES: No scleral icterus,  No proptosis  NECK: No visible masses.   RESP: Normal breathing  NEURO: awake, alert, responds appropriately to questions.    Ext; No swelling    DATA REVIEW  Labs/Imaging  5/22/25  A. Right adrenal gland:  - Adrenocortical neoplasm, consistent with adrenocortical adenoma   Electronically signed by Gris Rodriguez MD on 5/28/2025 at 1550 CDT   Comment  UUMAYO   Ki67 labeled 1% of the tumor cells.       Lab Results   Component Value Date     06/29/2025    POTASSIUM  4.8 06/29/2025    CHLORIDE 104 06/29/2025    CO2 23 06/29/2025    ANIONGAP 12 06/29/2025     (H) 06/29/2025    BUN 35.2 (H) 06/29/2025    CR 1.08 (H) 06/29/2025    GFRESTIMATED 59 (L) 06/29/2025    MIRNA 9.7 06/29/2025     Lab Results   Component Value Date    A1C 5.7 04/09/2025    A1C 6.0 01/25/2025    A1C 6.1 10/23/2024    A1C 6.2 05/08/2024    A1C 6.1 10/25/2023        ASSESSMENT/PLAN:     Geovanna Sepulveda is a 59 year old female with HTN who is here for Follow-up adrenal nodule with MACE and hyperaldosteronism  s/p right adrenalectomy 5/22/2025    ## 2.1 cm right adrenal nodule s/p right adrenalectomy 5/22/2025  ## Primary hyperaldosteronism  ## MACE (+ve 1 mg and 8 mg DST)   Overall feeling well  Basic metabolic panel is normal with normal potassium.  Possible that adrenal nodule produces both aldosterone and cortisol  -- will recheck 8 am lab Sat 7/12/25  -- continue amlodipine and hydrocortisone  -- Friday 7/11/25 only take hydrocortisone 15 mg in the morning (7 am), skip the afternoon dose  -- Saturday 7/12/25, get 8 am blood work and after blood work can resume hydrocortisone 15 mg AM and 5 mg PM    Follow-up 3-6 months       Orders Placed This Encounter   Procedures     Cortisol     Aldosterone     Renin activity     The longitudinal plan of care for the diagnosis(es)/condition(s) as documented were addressed during this visit. Due to the added complexity in care, I will continue to support Geovanna in the subsequent management and with ongoing continuity of care.       Antolin Millan MD        Again, thank you for allowing me to participate in the care of your patient.        Sincerely,        Antolin Millan MD    Electronically signed

## 2025-07-08 NOTE — PATIENT INSTRUCTIONS
## 2.1 cm right adrenal nodule s/p right adrenalectomy 5/22/2025  ## Primary hyperaldosteronism and mild autonomous cortisol excess   Overall feeling well  Normal potassium.  Possible that adrenal nodule produces both aldosterone and cortisol  -- will recheck 8 am lab Sat 7/12/25  -- continue amlodipine 5 mg daily and hydrocortisone 15/5 mg  -- Friday 7/11/25 only take hydrocortisone 15 mg in the morning (7 am), skip the afternoon dose  -- Saturday 7/12/25, get 8 am blood work and after blood work can resume hydrocortisone 15 mg AM and 5 mg PM    Follow-up 3-6 months  
57

## 2025-07-08 NOTE — PROGRESS NOTES
Endocrinology Clinic Visit       Geovanna Sepulveda is a 59 year old female with HTN who is here for Follow-up adrenal nodule with MACE and hyperaldosteronism s/p right adrenalectomy 5/22/2025    Interval History  At home 130s/80s on amlodipine 5 mg  When no taking amlodipine, BP 150s  Energy level is good    Initial visit  Pt started to have high BP 2010,   Pt saw primary Sherry CAMPOVERDE CNP, secondary causes of hypertension were checked and found to have primary hyperaldosteronism.    Pt has been off amlodipine and potassium for 1 month  Losartan decrease 0.5 tablet and increase spironolactone to 2 tablets 1 week ago    Dad and sisters with high BP  No FH of stroke/ heart attack  Mom with ovarian cancer  Sister with ovarian mass  Another sister with heart stent 2/2 blockage    [place]    Pt works in a factory   with stroke and passed away 2014.       REVIEW OF SYSTEMS  10 point negative except as mentioned in HPI    Past Medical/Surgical History:  Past Medical History:   Diagnosis Date    Hypertension 2012     Past Surgical History:   Procedure Laterality Date    CARPAL TUNNEL RELEASE RT/LT Bilateral 12/2020    [facility]    GYN SURGERY  2005    laparoscopy to clean fallopian tube in the [facility]    LAPAROSCOPIC ADRENALECTOMY Right 5/22/2025    Procedure: ADRENALECTOMY, LAPAROSCOPIC, posterior approach;  Surgeon: Toni Beverly MD;  Location[facility]       Medications  Current Outpatient Medications   Medication Sig Dispense Refill    albuterol (PROAIR HFA/PROVENTIL HFA/VENTOLIN HFA) 108 (90 Base) MCG/ACT inhaler Inhale 2 puffs into the lungs every 6 hours as needed for shortness of breath, wheezing or cough. 18 g 3    amLODIPine (NORVASC) 5 MG tablet Take 1 tablet (5 mg) by mouth daily. 90 tablet 0    atorvastatin (LIPITOR) 40 MG tablet Take 1 tablet (40 mg) by mouth daily. 90 tablet 1    gabapentin (NEURONTIN) 300 MG capsule Take 1 capsule (300 mg) by mouth 3 times daily.  270 capsule 1    hydrocortisone (CORTEF) 5 MG tablet Take 3 tablets (15 mg) by mouth every morning AND 1 tablet (5 mg) daily at 2 pm. 120 tablet 0    hydrOXYzine makeda (VISTARIL) 25 MG capsule Take 1 capsule (25 mg) by mouth nightly as needed.      VITAMIN D3 25 MCG tablet Take 1 tablet by mouth daily       No current facility-administered medications for this visit.       Allergies  Allergies   Allergen Reactions    Other Environmental Allergy Itching    Sulfamethoxazole-Trimethoprim Rash         Family History  family history includes Breast Cancer in her maternal aunt; Coronary Artery Disease in her sister; Diabetes Type 2  in her sister; Hyperlipidemia in her father and sister; Hypertension in her father; Other - See Comments in her sister; Ovarian Cancer (age of onset: 60) in her mother.    Social History  Social History     Tobacco Use    Smoking status: Never     Passive exposure: Never    Smokeless tobacco: Never   Substance Use Topics    Alcohol use: Never       Physical Exam  /81 (BP Location: Left arm)   Pulse 80   Wt 72.3 kg (159 lb 4.8 oz)   SpO2 96%   BMI 30.10 kg/m    Body mass index is 30.1 kg/m .  GENERAL :  In no apparent distress  EYES: No scleral icterus,  No proptosis  NECK: No visible masses.   RESP: Normal breathing  NEURO: awake, alert, responds appropriately to questions.    Ext; No swelling    DATA REVIEW  Labs/Imaging  5/22/25  A. Right adrenal gland:  - Adrenocortical neoplasm, consistent with adrenocortical adenoma   Electronically signed by Gris Rodriguez MD on 5/28/2025 at 1550 CDT   Comment  UUMAYO   Ki67 labeled 1% of the tumor cells.       Lab Results   Component Value Date     06/29/2025    POTASSIUM 4.8 06/29/2025    CHLORIDE 104 06/29/2025    CO2 23 06/29/2025    ANIONGAP 12 06/29/2025     (H) 06/29/2025    BUN 35.2 (H) 06/29/2025    CR 1.08 (H) 06/29/2025    GFRESTIMATED 59 (L) 06/29/2025    MIRNA 9.7 06/29/2025     Lab Results   Component Value Date    A1C  5.7 04/09/2025    A1C 6.0 01/25/2025    A1C 6.1 10/23/2024    A1C 6.2 05/08/2024    A1C 6.1 10/25/2023        ASSESSMENT/PLAN:     Geovanna Sepulveda is a 59 year old female with HTN who is here for Follow-up adrenal nodule with MACE and hyperaldosteronism  s/p right adrenalectomy 5/22/2025    ## 2.1 cm right adrenal nodule s/p right adrenalectomy 5/22/2025  ## Primary hyperaldosteronism  ## MACE (+ve 1 mg and 8 mg DST)   Overall feeling well  Basic metabolic panel is normal with normal potassium.  Possible that adrenal nodule produces both aldosterone and cortisol  -- will recheck 8 am lab Sat 7/12/25  -- continue amlodipine and hydrocortisone  -- Friday 7/11/25 only take hydrocortisone 15 mg in the morning (7 am), skip the afternoon dose  -- Saturday 7/12/25, get 8 am blood work and after blood work can resume hydrocortisone 15 mg AM and 5 mg PM    Follow-up 3-6 months       Orders Placed This Encounter   Procedures    Cortisol    Aldosterone    Renin activity     The longitudinal plan of care for the diagnosis(es)/condition(s) as documented were addressed during this visit. Due to the added complexity in care, I will continue to support Geovanna in the subsequent management and with ongoing continuity of care.       Antolin Millan MD       The resident's documentation has been prepared under my direction and personally reviewed by me in its entirety. I confirm that the note above accurately reflects all work, treatment, procedures, and medical decision making performed by me. jb Reagan MD  Please see MDM

## 2025-07-12 ENCOUNTER — LAB (OUTPATIENT)
Dept: LAB | Facility: CLINIC | Age: 60
End: 2025-07-12
Payer: COMMERCIAL

## 2025-07-12 DIAGNOSIS — E27.9 ADRENAL NODULE: ICD-10-CM

## 2025-07-12 DIAGNOSIS — I10 ESSENTIAL HYPERTENSION: ICD-10-CM

## 2025-07-12 LAB
ANION GAP SERPL CALCULATED.3IONS-SCNC: 12 MMOL/L (ref 7–15)
BUN SERPL-MCNC: 35.6 MG/DL (ref 6–20)
CALCIUM SERPL-MCNC: 9.8 MG/DL (ref 8.8–10.4)
CHLORIDE SERPL-SCNC: 105 MMOL/L (ref 98–107)
CORTIS SERPL-MCNC: 13.7 UG/DL
CREAT SERPL-MCNC: 0.95 MG/DL (ref 0.51–0.95)
EGFRCR SERPLBLD CKD-EPI 2021: 69 ML/MIN/1.73M2
GLUCOSE SERPL-MCNC: 132 MG/DL (ref 70–99)
HCO3 SERPL-SCNC: 21 MMOL/L (ref 22–29)
POTASSIUM SERPL-SCNC: 4.4 MMOL/L (ref 3.4–5.3)
SODIUM SERPL-SCNC: 138 MMOL/L (ref 135–145)

## 2025-07-12 PROCEDURE — 80048 BASIC METABOLIC PNL TOTAL CA: CPT

## 2025-07-12 PROCEDURE — 82088 ASSAY OF ALDOSTERONE: CPT

## 2025-07-12 PROCEDURE — 84244 ASSAY OF RENIN: CPT

## 2025-07-12 PROCEDURE — 82533 TOTAL CORTISOL: CPT

## 2025-07-12 PROCEDURE — 36415 COLL VENOUS BLD VENIPUNCTURE: CPT

## 2025-07-15 LAB
ALDOST SERPL-MCNC: 10.4 NG/DL (ref 0–31)
RENIN PLAS-CCNC: 1.9 NG/ML/HR

## 2025-07-22 ENCOUNTER — HOSPITAL ENCOUNTER (OUTPATIENT)
Facility: CLINIC | Age: 60
End: 2025-07-22
Attending: STUDENT IN AN ORGANIZED HEALTH CARE EDUCATION/TRAINING PROGRAM | Admitting: STUDENT IN AN ORGANIZED HEALTH CARE EDUCATION/TRAINING PROGRAM
Payer: COMMERCIAL

## 2025-07-22 ENCOUNTER — TELEPHONE (OUTPATIENT)
Dept: GASTROENTEROLOGY | Facility: CLINIC | Age: 60
End: 2025-07-22
Payer: COMMERCIAL

## 2025-07-22 NOTE — TELEPHONE ENCOUNTER
"Endoscopy Scheduling Screen    Caller: patient    Have you had any respiratory illness or flu-like symptoms in the last 10 days?  No    Patient is ACTIVE on Pand[employer].  Inform patient that all appointment instructions will be sent via Pand[employer].    Review patient's insurance for any non participating payor.    Ordering/Referring Provider: Sherry Jenkins APRN CNP   (If ordering provider performs procedure, schedule with ordering provider unless otherwise instructed. )    BMI: Estimated body mass index is 30.1 kg/m  as calculated from the following:    Height as of 6/25/25: 1.549 m (5' 1\").    Weight as of 7/8/25: 72.3 kg (159 lb 4.8 oz).     Sedation Ordered  moderate sedation.   If patient BMI > 50 do not schedule in [facility].    If patient BMI > 45 do not schedule at [facility].    Are you taking methadone or Suboxone?  NO, No RN review required.    Have you been diagnosed and are being treated for severe PTSD or severe anxiety?  NO, No RN review required.    Are you taking any prescription medications for pain 3 or more times per week?   NO, No RN review required.    Do you have a history of malignant hyperthermia?  No    (Females) Are you currently pregnant?   No     Have you been diagnosed or told you have pulmonary hypertension?   No    Do you have an LVAD?  No    Have you been told you have moderate to severe sleep apnea?  No.    Have you been told you have COPD, asthma, or any other lung disease?  No    Has your doctor ordered any cardiac tests like echo, angiogram, stress test, ablation, or EKG, that you have not completed yet?  No    Do you  have a history of any heart conditions?  No     Have you ever had or are you waiting for an organ transplant?  No. Continue scheduling, no site restrictions.    Have you had a stroke or transient ischemic attack (TIA aka \"mini stroke\") in the last 2 years?   No.    Have you been diagnosed with or been told you have cirrhosis of the liver?   No.    Are you currently on " "dialysis?   No    Do you need assistance transferring?   No    BMI: Estimated body mass index is 30.1 kg/m  as calculated from the following:    Height as of 6/25/25: 1.549 m (5' 1\").    Weight as of 7/8/25: 72.3 kg (159 lb 4.8 oz).     Is patients BMI > 40 and scheduling location [facility]?  No    Do you take an injectable or oral medication for weight loss or diabetes (excluding insulin)?  No    Do you take the medication Naltrexone?  No    Do you take blood thinners?  No       Prep   Are you currently on dialysis or do you have chronic kidney disease?  No    Do you have a diagnosis of diabetes?  No    Do you have a diagnosis of cystic fibrosis (CF)?  No    On a regular basis do you go 3 -5 days between bowel movements?  No    BMI > 40?  No    Preferred Pharmacy:    [id number] [place] [address] [address] [address]  Phone: [phone] Fax: [phone]    Final Scheduling Details     Procedure scheduled  Colonoscopy    Surgeon:  Nikki     Date of procedure:  9.17.25     Pre-OP / PAC:   No - Not required for this site.    Location  [facility] - [facility] - Patient preference.    Sedation   Moderate Sedation - Per order.      Patient Reminders:   You will receive a call from a Nurse to review instructions and health history.  This assessment must be completed prior to your procedure.  Failure to complete the Nurse assessment may result in the procedure being cancelled.      On the day of your procedure, please designate an adult(s) who can drive you home stay with you for the next 24 hours. The medicines used in the exam will make you sleepy. You will not be able to drive.      You cannot take public transportation, ride share services, or non-medical taxi service without a responsible caregiver.  Medical transport services are allowed with the requirement that a responsible caregiver will receive you at your destination.  We require that drivers and caregivers are confirmed " prior to your procedure.

## 2025-07-29 ENCOUNTER — APPOINTMENT (OUTPATIENT)
Dept: CT IMAGING | Facility: CLINIC | Age: 60
End: 2025-07-29
Payer: COMMERCIAL

## 2025-07-29 ENCOUNTER — HOSPITAL ENCOUNTER (EMERGENCY)
Facility: CLINIC | Age: 60
Discharge: HOME | End: 2025-07-29
Payer: COMMERCIAL

## 2025-07-29 VITALS
OXYGEN SATURATION: 98 % | HEIGHT: 61 IN | RESPIRATION RATE: 18 BRPM | BODY MASS INDEX: 30.02 KG/M2 | HEART RATE: 72 BPM | DIASTOLIC BLOOD PRESSURE: 62 MMHG | SYSTOLIC BLOOD PRESSURE: 125 MMHG | TEMPERATURE: 98.3 F | WEIGHT: 159 LBS

## 2025-07-29 DIAGNOSIS — K52.9 ENTERITIS: Primary | ICD-10-CM

## 2025-07-29 LAB
ALBUMIN SERPL BCG-MCNC: 4.7 G/DL (ref 3.5–5.2)
ALP SERPL-CCNC: 72 U/L (ref 40–150)
ALT SERPL W P-5'-P-CCNC: 35 U/L (ref 0–50)
ANION GAP SERPL CALCULATED.3IONS-SCNC: 11 MMOL/L (ref 7–15)
AST SERPL W P-5'-P-CCNC: 27 U/L (ref 0–45)
BASOPHILS # BLD AUTO: 0 10E3/UL (ref 0–0.2)
BASOPHILS NFR BLD AUTO: 0 %
BILIRUB SERPL-MCNC: 0.5 MG/DL
BUN SERPL-MCNC: 34.8 MG/DL (ref 8–23)
CALCIUM SERPL-MCNC: 9.8 MG/DL (ref 8.8–10.4)
CHLORIDE SERPL-SCNC: 101 MMOL/L (ref 98–107)
CREAT SERPL-MCNC: 0.94 MG/DL (ref 0.51–0.95)
EGFRCR SERPLBLD CKD-EPI 2021: 70 ML/MIN/1.73M2
EOSINOPHIL # BLD AUTO: 0.3 10E3/UL (ref 0–0.7)
EOSINOPHIL NFR BLD AUTO: 2 %
ERYTHROCYTE [DISTWIDTH] IN BLOOD BY AUTOMATED COUNT: 12.6 % (ref 10–15)
GLUCOSE SERPL-MCNC: 147 MG/DL (ref 70–99)
HCO3 SERPL-SCNC: 22 MMOL/L (ref 22–29)
HCT VFR BLD AUTO: 41.8 % (ref 35–47)
HGB BLD-MCNC: 14 G/DL (ref 11.7–15.7)
IMM GRANULOCYTES # BLD: 0 10E3/UL
IMM GRANULOCYTES NFR BLD: 0 %
LIPASE SERPL-CCNC: 44 U/L (ref 13–60)
LYMPHOCYTES # BLD AUTO: 0.6 10E3/UL (ref 0.8–5.3)
LYMPHOCYTES NFR BLD AUTO: 4 %
MCH RBC QN AUTO: 29.5 PG (ref 26.5–33)
MCHC RBC AUTO-ENTMCNC: 33.5 G/DL (ref 31.5–36.5)
MCV RBC AUTO: 88 FL (ref 78–100)
MONOCYTES # BLD AUTO: 0.5 10E3/UL (ref 0–1.3)
MONOCYTES NFR BLD AUTO: 3 %
NEUTROPHILS # BLD AUTO: 12.8 10E3/UL (ref 1.6–8.3)
NEUTROPHILS NFR BLD AUTO: 90 %
NRBC # BLD AUTO: 0 10E3/UL
NRBC BLD AUTO-RTO: 0 /100
PLATELET # BLD AUTO: 320 10E3/UL (ref 150–450)
POTASSIUM SERPL-SCNC: 4.9 MMOL/L (ref 3.4–5.3)
PROT SERPL-MCNC: 8.3 G/DL (ref 6.4–8.3)
RBC # BLD AUTO: 4.74 10E6/UL (ref 3.8–5.2)
SODIUM SERPL-SCNC: 134 MMOL/L (ref 135–145)
WBC # BLD AUTO: 14.1 10E3/UL (ref 4–11)

## 2025-07-29 PROCEDURE — 84155 ASSAY OF PROTEIN SERUM: CPT | Performed by: EMERGENCY MEDICINE

## 2025-07-29 PROCEDURE — 250N000011 HC RX IP 250 OP 636

## 2025-07-29 PROCEDURE — 96361 HYDRATE IV INFUSION ADD-ON: CPT

## 2025-07-29 PROCEDURE — 250N000011 HC RX IP 250 OP 636: Performed by: EMERGENCY MEDICINE

## 2025-07-29 PROCEDURE — 83690 ASSAY OF LIPASE: CPT | Performed by: EMERGENCY MEDICINE

## 2025-07-29 PROCEDURE — 85014 HEMATOCRIT: CPT | Performed by: EMERGENCY MEDICINE

## 2025-07-29 PROCEDURE — 85004 AUTOMATED DIFF WBC COUNT: CPT | Performed by: EMERGENCY MEDICINE

## 2025-07-29 PROCEDURE — 96375 TX/PRO/DX INJ NEW DRUG ADDON: CPT

## 2025-07-29 PROCEDURE — 74177 CT ABD & PELVIS W/CONTRAST: CPT

## 2025-07-29 PROCEDURE — 96374 THER/PROPH/DIAG INJ IV PUSH: CPT | Mod: 59

## 2025-07-29 PROCEDURE — 82435 ASSAY OF BLOOD CHLORIDE: CPT | Performed by: EMERGENCY MEDICINE

## 2025-07-29 PROCEDURE — 258N000003 HC RX IP 258 OP 636: Performed by: EMERGENCY MEDICINE

## 2025-07-29 PROCEDURE — 99285 EMERGENCY DEPT VISIT HI MDM: CPT | Mod: 25

## 2025-07-29 PROCEDURE — 36415 COLL VENOUS BLD VENIPUNCTURE: CPT | Performed by: EMERGENCY MEDICINE

## 2025-07-29 RX ORDER — ONDANSETRON 2 MG/ML
4 INJECTION INTRAMUSCULAR; INTRAVENOUS
Status: COMPLETED | OUTPATIENT
Start: 2025-07-29 | End: 2025-07-29

## 2025-07-29 RX ORDER — IOPAMIDOL 755 MG/ML
90 INJECTION, SOLUTION INTRAVASCULAR ONCE
Status: COMPLETED | OUTPATIENT
Start: 2025-07-29 | End: 2025-07-29

## 2025-07-29 RX ORDER — ONDANSETRON 4 MG/1
4 TABLET, ORALLY DISINTEGRATING ORAL EVERY 8 HOURS PRN
Qty: 20 TABLET | Refills: 0 | OUTPATIENT
Start: 2025-07-29

## 2025-07-29 RX ORDER — LOPERAMIDE HYDROCHLORIDE 2 MG/1
2 TABLET ORAL 4 TIMES DAILY PRN
Refills: 0 | OUTPATIENT
Start: 2025-07-29

## 2025-07-29 RX ORDER — LOPERAMIDE HYDROCHLORIDE 2 MG/1
2 TABLET ORAL 4 TIMES DAILY PRN
Qty: 20 TABLET | Refills: 0 | Status: SHIPPED | OUTPATIENT
Start: 2025-07-29

## 2025-07-29 RX ORDER — ONDANSETRON 4 MG/1
4 TABLET, ORALLY DISINTEGRATING ORAL EVERY 8 HOURS PRN
Qty: 20 TABLET | Refills: 0 | Status: SHIPPED | OUTPATIENT
Start: 2025-07-29

## 2025-07-29 RX ADMIN — ONDANSETRON 4 MG: 2 INJECTION, SOLUTION INTRAMUSCULAR; INTRAVENOUS at 13:52

## 2025-07-29 RX ADMIN — IOPAMIDOL 90 ML: 755 INJECTION, SOLUTION INTRAVENOUS at 15:30

## 2025-07-29 RX ADMIN — FAMOTIDINE 20 MG: 10 INJECTION INTRAVENOUS at 15:26

## 2025-07-29 RX ADMIN — SODIUM CHLORIDE 1000 ML: 0.9 INJECTION, SOLUTION INTRAVENOUS at 13:52

## 2025-07-29 ASSESSMENT — COLUMBIA-SUICIDE SEVERITY RATING SCALE - C-SSRS
2. HAVE YOU ACTUALLY HAD ANY THOUGHTS OF KILLING YOURSELF IN THE PAST MONTH?: NO
1. IN THE PAST MONTH, HAVE YOU WISHED YOU WERE DEAD OR WISHED YOU COULD GO TO SLEEP AND NOT WAKE UP?: NO
6. HAVE YOU EVER DONE ANYTHING, STARTED TO DO ANYTHING, OR PREPARED TO DO ANYTHING TO END YOUR LIFE?: NO

## 2025-07-29 ASSESSMENT — ACTIVITIES OF DAILY LIVING (ADL): ADLS_ACUITY_SCORE: 49

## 2025-07-29 NOTE — Clinical Note
Geovanna Sepulveda was seen and treated in our emergency department on 7/29/2025.  She may return to work on 07/31/2025.       If you have any questions or concerns, please don't hesitate to call.      Saima Cerna PA-C

## 2025-07-29 NOTE — ED TRIAGE NOTES
Pt here with possible food poisoning. Ate [employer] last night and had chicken and coleslaw. Woke this AM with abdominal pain with N/V/D     Triage Assessment (Adult)       Row Name 07/29/25 1342          Triage Assessment    Airway WDL WDL        Respiratory WDL    Respiratory WDL WDL        Skin Circulation/Temperature WDL    Skin Circulation/Temperature WDL WDL        Cardiac WDL    Cardiac WDL WDL        Peripheral/Neurovascular WDL    Peripheral Neurovascular WDL WDL        Cognitive/Neuro/Behavioral WDL    Cognitive/Neuro/Behavioral WDL WDL

## 2025-07-29 NOTE — DISCHARGE INSTRUCTIONS
You are seen in the emergency department for abdominal pain, nausea, vomiting, diarrhea.  Your workup here reveals an elevated white blood cell count and a CT scan that shows enteritis AKA inflammation of the small intestine.  This can occur from food poisoning.  I suspect that that likely is what is causing it.  The treatment for poisoning is to just but it run its course.  You can take medications to help with your symptoms including Zofran for nausea and vomiting, Imodium for diarrhea, try to stay hydrated.  Tylenol for abdominal discomfort.  If you get better in a few days.  If it is not improving after about a week or you start to develop concerning symptoms such as vomiting blood, blood in your stool, cough, or other concerning symptoms, then you would want to return to the emergency department.

## 2025-07-29 NOTE — ED PROVIDER NOTES
EMERGENCY DEPARTMENT ENCOUNTER      NAME: Geovanna Sepulveda  AGE: 60 year old female  YOB: 1965  MRN: [id number]  EVALUATION DATE & TIME: No admission date for patient encounter.    PCP: Sherry Jenkins    ED PROVIDER: Saima Cerna PA-C    CHIEF COMPLAINT  Abdominal Pain and Nausea, Vomiting, & Diarrhea      FINAL IMPRESSION:      ICD-10-CM    1. Enteritis  K52.9           MEDICAL DECISION MAKING AND ED COURSE:  Pertinent Labs & Imaging studies reviewed (See chart for details)  ED Course as of 07/29/25 1922 Tue Jul 29, 2025   1510 Geovanna Sepulveda is a 60 year old female with a pertinent history of HTN, prediabetes, CKD who presents to this ED by walk in for evaluation of abdominal cramping, nausea, vomiting, diarrhea that started this AM. Vitals reviewed, mildly tachycardic at 102 otherwise afebrile and normotensive. Seen in the waiting room due to boarding crisis. Epigastric and umbilical abdominal tenderness, otherwise non-tender throughout. Mucous membranes moist.     Labs obtained in triage revealed leukocytosis at 14.1, no anemia. Lipase and LFTs wnl. No TRAM. Slight hyponatremia at 134, received 1L fluids, zofran, and pepcid.    1525 Given abdominal tenderness on exam as well as leukocytosis, nausea, vomiting, and diarrhea, it is reasonable to obtain CT imaging.  Patient agreeable.     1921 With evidence of enteritis.  I do think this fits with the picture of patient's symptoms.  But she is tolerating p.o. and well-appearing here and I think reasonable for discharge home.  Her mild tachycardia of 102 improved to 72 with fluids and she reports symptoms have improved.  Will recommend Imodium and Zofran and strict return precautions if symptoms worsen.  She was discharged in stable condition.  All questions answered.         MEDICATIONS GIVEN IN THE EMERGENCY:  Medications   ondansetron (ZOFRAN) injection 4 mg (4 mg Intravenous $Given 7/29/25 1352)   sodium chloride 0.9% BOLUS 1,000 mL (0 mLs  "Intravenous Stopped 7/29/25 1621)   famotidine (PEPCID) injection 20 mg (20 mg Intravenous $Given 7/29/25 1526)   iopamidol (ISOVUE-370) solution 90 mL (90 mLs Intravenous $Given 7/29/25 1530)       NEW PRESCRIPTIONS STARTED AT TODAY'S ER VISIT  Discharge Medication List as of 7/29/2025  4:21 PM        START taking these medications    Details   loperamide (IMODIUM A-D) 2 MG tablet Take 1 tablet (2 mg) by mouth 4 times daily as needed for diarrhea., Disp-20 tablet, R-0, E-Prescribe      ondansetron (ZOFRAN ODT) 4 MG ODT tab Take 1 tablet (4 mg) by mouth every 8 hours as needed for nausea or vomiting., Disp-20 tablet, R-0, E-Prescribe           Discharge Medication List as of 7/29/2025  4:21 PM          =================================================================    HPI    Patient information was obtained from: patient   Use of : N/A    Geovanna GABRIELE Sepulveda is a 60 year old female with a pertinent history of HTN, prediabetes, CKD who presents to this ED by walk in for evaluation of abdominal cramping, nausea, vomiting, diarrhea that started this AM.     Had [facility] last night for dinner. She was the only one who had the coseslaw. Woke up this AM with nausea, vomiting, and diarrhea around 9am. Worried she has food poisoning. Multiple episodes of vomiting and diarrhea. No melena or hematochezia or hematemesis. Stools are yellow/watery. Feels a bit better after vomiting but pain comes back again and then has to have a bowel movement. No fevers, urinary symptoms, chest pain, dyspnea. No sick contacts.    PHYSICAL EXAM    /62   Pulse 72   Temp 98.3  F (36.8  C)   Resp 18   Ht 1.549 m (5' 1\")   Wt 72.1 kg (159 lb)   SpO2 98%   BMI 30.04 kg/m    Constitutional: Well developed, Well nourished, NAD, GCS 15  HENT: Normocephalic, Atraumatic, Bilateral external ears normal, Oropharynx normal, mucous membranes moist, Nose normal. Neck- Normal range of motion, No tenderness, Supple, No stridor.    Eyes: EOMI, " Conjunctiva normal, No discharge.   Respiratory: No respiratory distress, No wheezing, Speaks full sentences easily. No cough.    Cardiovascular: Normal heart rate, good peripheral perfusion noted throughout.  GI: Soft, umbilical and epigastric tenderness, No masses, No flank tenderness. No rebound or guarding.    Musculoskeletal: No edema. No cyanosis, No clubbing. Good range of motion in all major joints.  Integument: Warm, Dry, No erythema, No rash.    Neurologic: Alert & oriented x 3, Normal motor function, Normal sensory function, No focal deficits noted. Normal gait.    Psychiatric: Affect normal, Judgment normal, Mood normal. Cooperative.      LAB:  All pertinent labs reviewed and interpreted.  Results for orders placed or performed during the hospital encounter of 07/29/25   CT Abdomen Pelvis w Contrast    Impression    IMPRESSION:   1.  Mild generalized nonspecific small bowel enteritis proximal small intestine.   Comprehensive Metabolic Panel (Limited Occurrences)   Result Value Ref Range    Sodium 134 (L) 135 - 145 mmol/L    Potassium 4.9 3.4 - 5.3 mmol/L    Carbon Dioxide (CO2) 22 22 - 29 mmol/L    Anion Gap 11 7 - 15 mmol/L    Urea Nitrogen 34.8 (H) 8.0 - 23.0 mg/dL    Creatinine 0.94 0.51 - 0.95 mg/dL    GFR Estimate 70 >60 mL/min/1.73m2    Calcium 9.8 8.8 - 10.4 mg/dL    Chloride 101 98 - 107 mmol/L    Glucose 147 (H) 70 - 99 mg/dL    Alkaline Phosphatase 72 40 - 150 U/L    AST 27 0 - 45 U/L    ALT 35 0 - 50 U/L    Protein Total 8.3 6.4 - 8.3 g/dL    Albumin 4.7 3.5 - 5.2 g/dL    Bilirubin Total 0.5 <=1.2 mg/dL   Result Value Ref Range    Lipase 44 13 - 60 U/L   CBC with platelets and differential   Result Value Ref Range    WBC Count 14.1 (H) 4.0 - 11.0 10e3/uL    RBC Count 4.74 3.80 - 5.20 10e6/uL    Hemoglobin 14.0 11.7 - 15.7 g/dL    Hematocrit 41.8 35.0 - 47.0 %    MCV 88 78 - 100 fL    MCH 29.5 26.5 - 33.0 pg    MCHC 33.5 31.5 - 36.5 g/dL    RDW 12.6 10.0 - 15.0 %    Platelet Count 320 150 - 450  10e3/uL    % Neutrophils 90 %    % Lymphocytes 4 %    % Monocytes 3 %    % Eosinophils 2 %    % Basophils 0 %    % Immature Granulocytes 0 %    NRBCs per 100 WBC 0 <1 /100    Absolute Neutrophils 12.8 (H) 1.6 - 8.3 10e3/uL    Absolute Lymphocytes 0.6 (L) 0.8 - 5.3 10e3/uL    Absolute Monocytes 0.5 0.0 - 1.3 10e3/uL    Absolute Eosinophils 0.3 0.0 - 0.7 10e3/uL    Absolute Basophils 0.0 0.0 - 0.2 10e3/uL    Absolute Immature Granulocytes 0.0 <=0.4 10e3/uL    Absolute NRBCs 0.0 10e3/uL       RADIOLOGY:  Reviewed all pertinent imaging. Please see official radiology report.  CT Abdomen Pelvis w Contrast   Final Result   IMPRESSION:    1.  Mild generalized nonspecific small bowel enteritis proximal small intestine.          Medical Decision Making  I reviewed the EMR: Outpatient Record: office visit from 7/8/25  {ADMIT VS D/C:[id number]}    MIPS (CTPE, Dental pain, Pearson, Sinusitis, Asthma/COPD, Head Trauma): {[facility] MIPS DOCUMENTATION:[id number]}    SEPSIS: None      Saima SALAZAR [facility] EMERGENCY ROOM  [address] [id number] [phone]

## 2025-08-17 ENCOUNTER — PATIENT OUTREACH (OUTPATIENT)
Dept: CARE COORDINATION | Facility: CLINIC | Age: 60
End: 2025-08-17
Payer: COMMERCIAL

## 2025-08-18 ENCOUNTER — CARE COORDINATION (OUTPATIENT)
Dept: ENDOCRINOLOGY | Facility: CLINIC | Age: 60
End: 2025-08-18
Payer: COMMERCIAL

## 2025-08-18 ENCOUNTER — TELEPHONE (OUTPATIENT)
Dept: ENDOCRINOLOGY | Facility: CLINIC | Age: 60
End: 2025-08-18
Payer: COMMERCIAL

## 2025-08-18 DIAGNOSIS — E27.9 ADRENAL NODULE: Primary | ICD-10-CM

## 2025-08-20 ENCOUNTER — LAB (OUTPATIENT)
Dept: LAB | Facility: CLINIC | Age: 60
End: 2025-08-20
Payer: COMMERCIAL

## 2025-08-20 ENCOUNTER — OFFICE VISIT (OUTPATIENT)
Dept: ENDOCRINOLOGY | Facility: CLINIC | Age: 60
End: 2025-08-20
Payer: COMMERCIAL

## 2025-08-20 VITALS
DIASTOLIC BLOOD PRESSURE: 84 MMHG | HEART RATE: 64 BPM | WEIGHT: 161.5 LBS | OXYGEN SATURATION: 98 % | BODY MASS INDEX: 30.52 KG/M2 | SYSTOLIC BLOOD PRESSURE: 135 MMHG

## 2025-08-20 DIAGNOSIS — E27.9 ADRENAL NODULE: Primary | ICD-10-CM

## 2025-08-20 DIAGNOSIS — R53.83 LOW ENERGY: ICD-10-CM

## 2025-08-20 DIAGNOSIS — E27.9 ADRENAL NODULE: ICD-10-CM

## 2025-08-20 LAB
ALBUMIN SERPL BCG-MCNC: 4.7 G/DL (ref 3.5–5.2)
ANION GAP SERPL CALCULATED.3IONS-SCNC: 10 MMOL/L (ref 7–15)
BUN SERPL-MCNC: 31.6 MG/DL (ref 8–23)
CALCIUM SERPL-MCNC: 10 MG/DL (ref 8.8–10.4)
CHLORIDE SERPL-SCNC: 105 MMOL/L (ref 98–107)
CORTIS SERPL-MCNC: 9.9 UG/DL
CREAT SERPL-MCNC: 1.01 MG/DL (ref 0.51–0.95)
EGFRCR SERPLBLD CKD-EPI 2021: 64 ML/MIN/1.73M2
EST. AVERAGE GLUCOSE BLD GHB EST-MCNC: 131 MG/DL
GLUCOSE SERPL-MCNC: 113 MG/DL (ref 70–99)
HBA1C MFR BLD: 6.2 %
HCO3 SERPL-SCNC: 22 MMOL/L (ref 22–29)
POTASSIUM SERPL-SCNC: 4.8 MMOL/L (ref 3.4–5.3)
SODIUM SERPL-SCNC: 137 MMOL/L (ref 135–145)
TSH SERPL DL<=0.005 MIU/L-ACNC: 1.56 UIU/ML (ref 0.3–4.2)

## 2025-08-20 PROCEDURE — 83036 HEMOGLOBIN GLYCOSYLATED A1C: CPT

## 2025-08-20 PROCEDURE — 80048 BASIC METABOLIC PNL TOTAL CA: CPT

## 2025-08-20 PROCEDURE — 84443 ASSAY THYROID STIM HORMONE: CPT

## 2025-08-20 PROCEDURE — 82533 TOTAL CORTISOL: CPT

## 2025-08-20 PROCEDURE — 36415 COLL VENOUS BLD VENIPUNCTURE: CPT

## 2025-08-20 PROCEDURE — 82040 ASSAY OF SERUM ALBUMIN: CPT

## 2025-08-21 ENCOUNTER — PATIENT OUTREACH (OUTPATIENT)
Dept: CARE COORDINATION | Facility: CLINIC | Age: 60
End: 2025-08-21
Payer: COMMERCIAL

## 2025-08-25 ENCOUNTER — PATIENT OUTREACH (OUTPATIENT)
Dept: CARE COORDINATION | Facility: CLINIC | Age: 60
End: 2025-08-25
Payer: COMMERCIAL

## 2025-08-29 ENCOUNTER — TELEPHONE (OUTPATIENT)
Dept: GASTROENTEROLOGY | Facility: CLINIC | Age: 60
End: 2025-08-29
Payer: COMMERCIAL

## 2025-09-03 ENCOUNTER — TELEPHONE (OUTPATIENT)
Dept: GASTROENTEROLOGY | Facility: CLINIC | Age: 60
End: 2025-09-03
Payer: COMMERCIAL

## (undated) DEVICE — Device

## (undated) DEVICE — GLOVE PROTEXIS BLUE W/NEU-THERA 7.5  2D73EB75

## (undated) DEVICE — ESU PENCIL SMOKE EVAC W/ROCKER SWITCH 0703-047-000

## (undated) DEVICE — STRAP UNIVERSAL POSITIONING 2-PIECE 4X47X76" US2P3C4W01

## (undated) DEVICE — SU VICRYL 0 UR-6 27" J603H

## (undated) DEVICE — ENDO TROCAR BLUNT TIP KII BALLOON 12X100MM C0R47

## (undated) DEVICE — SOL NACL 0.9% INJ 1000ML BAG 07983-09

## (undated) DEVICE — SU MONOCRYL 4-0 PS-2 27" UND Y426H

## (undated) DEVICE — ESU LIGASURE MARYLAND LAPAROSCOPIC SLR/DVDR 5MMX37CM LF1937

## (undated) DEVICE — LINEN TOWEL PACK X5 5464

## (undated) DEVICE — SOL NACL 0.9% IRRIG 1000ML BOTTLE 2F7124

## (undated) DEVICE — DRAPE SHEET REV FOLD 3/4 9349

## (undated) DEVICE — ENDO POUCH UNIV RETRIEVAL SYSTEM INZII 10MM CD001

## (undated) DEVICE — LIGHT HANDLE X1 31140133

## (undated) DEVICE — SUCTION MANIFOLD NEPTUNE 2 SYS 4 PORT 0702-020-000

## (undated) DEVICE — GLOVE PROTEXIS POWDER FREE SMT 7.5  2D72PT75X

## (undated) DEVICE — ANTIFOG SOLUTION W/FOAM PAD CF-1002

## (undated) DEVICE — SUCTION IRR STRYKERFLOW II W/TIP 250-070-520

## (undated) DEVICE — SURGICEL HEMOSTAT 4X8" 1952

## (undated) DEVICE — COVER CAMERA IN-LIGHT DISP LT-C02

## (undated) DEVICE — TUBING SMOKE EVACUATOR 7/8"X10' W/WAND 0700-026-000

## (undated) DEVICE — DRSG TEGADERM 4X4 3/4" 1626W

## (undated) DEVICE — SYR 10ML FINGER CONTROL W/O NDL 309695

## (undated) DEVICE — ENDO TROCAR FIRST ENTRY KII FIOS ADV FIX 05X100MM CFF03

## (undated) DEVICE — SOL WATER IRRIG 1000ML BOTTLE 2F7114

## (undated) DEVICE — LINEN TOWEL PACK X6 WHITE 5487

## (undated) DEVICE — TUBING SMOKE EVAC PNEUMOCLEAR HIGH FLOW 0620050250

## (undated) DEVICE — ENDO TROCAR SLEEVE KII ADV FIXATION 05X100MM CFS02

## (undated) DEVICE — KIT POSITIONER TRENDELENBURG NUBLUE TP3000-NB

## (undated) DEVICE — PREP CHLORAPREP 26ML TINTED HI-LITE ORANGE 930815

## (undated) RX ORDER — HYDROMORPHONE HYDROCHLORIDE 1 MG/ML
INJECTION, SOLUTION INTRAMUSCULAR; INTRAVENOUS; SUBCUTANEOUS
Status: DISPENSED
Start: 2025-05-22

## (undated) RX ORDER — ACETAMINOPHEN 325 MG/1
TABLET ORAL
Status: DISPENSED
Start: 2025-05-22

## (undated) RX ORDER — FENTANYL CITRATE-0.9 % NACL/PF 10 MCG/ML
PLASTIC BAG, INJECTION (ML) INTRAVENOUS
Status: DISPENSED
Start: 2025-05-22

## (undated) RX ORDER — ONDANSETRON 2 MG/ML
INJECTION INTRAMUSCULAR; INTRAVENOUS
Status: DISPENSED
Start: 2025-05-22

## (undated) RX ORDER — DEXAMETHASONE SODIUM PHOSPHATE 4 MG/ML
INJECTION, SOLUTION INTRA-ARTICULAR; INTRALESIONAL; INTRAMUSCULAR; INTRAVENOUS; SOFT TISSUE
Status: DISPENSED
Start: 2025-05-22

## (undated) RX ORDER — PROPOFOL 10 MG/ML
INJECTION, EMULSION INTRAVENOUS
Status: DISPENSED
Start: 2025-05-22

## (undated) RX ORDER — FENTANYL CITRATE 50 UG/ML
INJECTION, SOLUTION INTRAMUSCULAR; INTRAVENOUS
Status: DISPENSED
Start: 2025-05-22

## (undated) RX ORDER — OXYCODONE HYDROCHLORIDE 5 MG/1
TABLET ORAL
Status: DISPENSED
Start: 2025-05-22

## (undated) RX ORDER — BUPIVACAINE HYDROCHLORIDE AND EPINEPHRINE 2.5; 5 MG/ML; UG/ML
INJECTION, SOLUTION EPIDURAL; INFILTRATION; INTRACAUDAL; PERINEURAL
Status: DISPENSED
Start: 2025-05-22